# Patient Record
Sex: MALE | Race: WHITE | NOT HISPANIC OR LATINO
[De-identification: names, ages, dates, MRNs, and addresses within clinical notes are randomized per-mention and may not be internally consistent; named-entity substitution may affect disease eponyms.]

---

## 2017-01-17 ENCOUNTER — APPOINTMENT (OUTPATIENT)
Dept: NEUROLOGY | Facility: CLINIC | Age: 82
End: 2017-01-17

## 2017-01-17 VITALS
BODY MASS INDEX: 26.66 KG/M2 | HEART RATE: 59 BPM | SYSTOLIC BLOOD PRESSURE: 130 MMHG | HEIGHT: 65 IN | OXYGEN SATURATION: 89 % | WEIGHT: 160 LBS | DIASTOLIC BLOOD PRESSURE: 78 MMHG

## 2017-01-17 DIAGNOSIS — F03.90 UNSPECIFIED DEMENTIA W/OUT BEHAVIORAL DISTURBANCE: ICD-10-CM

## 2017-05-24 ENCOUNTER — EMERGENCY (EMERGENCY)
Facility: HOSPITAL | Age: 82
LOS: 1 days | Discharge: HOME | End: 2017-05-24
Admitting: EMERGENCY MEDICINE
Payer: COMMERCIAL

## 2017-05-24 DIAGNOSIS — S80.01XA CONTUSION OF RIGHT KNEE, INITIAL ENCOUNTER: ICD-10-CM

## 2017-05-24 DIAGNOSIS — M25.562 PAIN IN LEFT KNEE: ICD-10-CM

## 2017-05-24 DIAGNOSIS — S01.21XA LACERATION WITHOUT FOREIGN BODY OF NOSE, INITIAL ENCOUNTER: ICD-10-CM

## 2017-05-24 DIAGNOSIS — E78.00 PURE HYPERCHOLESTEROLEMIA, UNSPECIFIED: ICD-10-CM

## 2017-05-24 DIAGNOSIS — E11.9 TYPE 2 DIABETES MELLITUS WITHOUT COMPLICATIONS: ICD-10-CM

## 2017-05-24 DIAGNOSIS — Z23 ENCOUNTER FOR IMMUNIZATION: ICD-10-CM
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
## 2017-05-24 DIAGNOSIS — Z87.891 PERSONAL HISTORY OF NICOTINE DEPENDENCE: ICD-10-CM

## 2017-05-24 DIAGNOSIS — I10 ESSENTIAL (PRIMARY) HYPERTENSION: ICD-10-CM

## 2017-05-24 DIAGNOSIS — Z79.82 LONG TERM (CURRENT) USE OF ASPIRIN: ICD-10-CM
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
## 2017-05-24 PROCEDURE — 72125 CT NECK SPINE W/O DYE: CPT | Mod: 26

## 2017-05-24 PROCEDURE — 70486 CT MAXILLOFACIAL W/O DYE: CPT

## 2017-05-24 PROCEDURE — 12011 RPR F/E/E/N/L/M 2.5 CM/<: CPT

## 2017-05-24 PROCEDURE — 72125 CT NECK SPINE W/O DYE: CPT

## 2017-05-24 PROCEDURE — 73562 X-RAY EXAM OF KNEE 3: CPT

## 2017-05-24 PROCEDURE — 99284 EMERGENCY DEPT VISIT MOD MDM: CPT | Mod: 25

## 2017-05-24 PROCEDURE — 70450 CT HEAD/BRAIN W/O DYE: CPT | Mod: 26

## 2017-05-24 PROCEDURE — 73562 X-RAY EXAM OF KNEE 3: CPT | Mod: 26,RT

## 2017-05-24 PROCEDURE — 70486 CT MAXILLOFACIAL W/O DYE: CPT | Mod: 26

## 2017-05-24 PROCEDURE — 90471 IMMUNIZATION ADMIN: CPT

## 2017-05-24 PROCEDURE — 70450 CT HEAD/BRAIN W/O DYE: CPT

## 2017-08-14 ENCOUNTER — APPOINTMENT (OUTPATIENT)
Dept: ULTRASOUND IMAGING | Facility: HOSPITAL | Age: 82
End: 2017-08-14
Payer: MEDICARE

## 2017-08-14 ENCOUNTER — OUTPATIENT (OUTPATIENT)
Dept: OUTPATIENT SERVICES | Facility: HOSPITAL | Age: 82
LOS: 1 days | End: 2017-08-14
Payer: COMMERCIAL

## 2017-08-14 DIAGNOSIS — N28.1 CYST OF KIDNEY, ACQUIRED: ICD-10-CM

## 2017-08-14 PROCEDURE — 76775 US EXAM ABDO BACK WALL LIM: CPT

## 2017-08-14 PROCEDURE — 76775 US EXAM ABDO BACK WALL LIM: CPT | Mod: 26

## 2018-01-18 ENCOUNTER — TRANSCRIPTION ENCOUNTER (OUTPATIENT)
Age: 83
End: 2018-01-18

## 2019-05-18 ENCOUNTER — EMERGENCY (EMERGENCY)
Facility: HOSPITAL | Age: 84
LOS: 1 days | Discharge: HOME | End: 2019-05-18
Attending: EMERGENCY MEDICINE | Admitting: EMERGENCY MEDICINE
Payer: COMMERCIAL

## 2019-05-18 VITALS
SYSTOLIC BLOOD PRESSURE: 122 MMHG | HEIGHT: 65 IN | TEMPERATURE: 98 F | DIASTOLIC BLOOD PRESSURE: 55 MMHG | WEIGHT: 192.9 LBS | OXYGEN SATURATION: 98 % | RESPIRATION RATE: 18 BRPM | HEART RATE: 67 BPM

## 2019-05-18 VITALS
RESPIRATION RATE: 17 BRPM | HEART RATE: 67 BPM | SYSTOLIC BLOOD PRESSURE: 144 MMHG | OXYGEN SATURATION: 97 % | DIASTOLIC BLOOD PRESSURE: 52 MMHG

## 2019-05-18 LAB
ALBUMIN SERPL ELPH-MCNC: 3.1 G/DL — LOW (ref 3.3–5)
ALP SERPL-CCNC: 109 U/L — SIGNIFICANT CHANGE UP (ref 40–120)
ALT FLD-CCNC: 39 U/L DA — SIGNIFICANT CHANGE UP (ref 10–45)
ANION GAP SERPL CALC-SCNC: 9 MMOL/L — SIGNIFICANT CHANGE UP (ref 5–17)
APTT BLD: 31.1 SEC — SIGNIFICANT CHANGE UP (ref 27.5–36.3)
AST SERPL-CCNC: 27 U/L — SIGNIFICANT CHANGE UP (ref 10–40)
BILIRUB SERPL-MCNC: 0.4 MG/DL — SIGNIFICANT CHANGE UP (ref 0.2–1.2)
BUN SERPL-MCNC: 69 MG/DL — HIGH (ref 7–23)
CALCIUM SERPL-MCNC: 8 MG/DL — LOW (ref 8.4–10.5)
CHLORIDE SERPL-SCNC: 108 MMOL/L — SIGNIFICANT CHANGE UP (ref 96–108)
CO2 SERPL-SCNC: 23 MMOL/L — SIGNIFICANT CHANGE UP (ref 22–31)
CREAT SERPL-MCNC: 2.8 MG/DL — HIGH (ref 0.5–1.3)
GLUCOSE SERPL-MCNC: 177 MG/DL — HIGH (ref 70–99)
HCT VFR BLD CALC: 33.8 % — LOW (ref 39–50)
HGB BLD-MCNC: 10.7 G/DL — LOW (ref 13–17)
INR BLD: 1.08 RATIO — SIGNIFICANT CHANGE UP (ref 0.88–1.16)
MCHC RBC-ENTMCNC: 27.6 PG — SIGNIFICANT CHANGE UP (ref 27–34)
MCHC RBC-ENTMCNC: 31.7 GM/DL — LOW (ref 32–36)
MCV RBC AUTO: 87.3 FL — SIGNIFICANT CHANGE UP (ref 80–100)
NRBC # BLD: 0 /100 WBCS — SIGNIFICANT CHANGE UP (ref 0–0)
PLATELET # BLD AUTO: 193 K/UL — SIGNIFICANT CHANGE UP (ref 150–400)
POTASSIUM SERPL-MCNC: 4.6 MMOL/L — SIGNIFICANT CHANGE UP (ref 3.5–5.3)
POTASSIUM SERPL-SCNC: 4.6 MMOL/L — SIGNIFICANT CHANGE UP (ref 3.5–5.3)
PROT SERPL-MCNC: 6.3 G/DL — SIGNIFICANT CHANGE UP (ref 6–8.3)
PROTHROM AB SERPL-ACNC: 12.1 SEC — SIGNIFICANT CHANGE UP (ref 10–12.9)
RBC # BLD: 3.87 M/UL — LOW (ref 4.2–5.8)
RBC # FLD: 15.2 % — HIGH (ref 10.3–14.5)
SODIUM SERPL-SCNC: 140 MMOL/L — SIGNIFICANT CHANGE UP (ref 135–145)
TROPONIN I SERPL-MCNC: 0.02 NG/ML — SIGNIFICANT CHANGE UP (ref 0.02–0.06)
WBC # BLD: 10.33 K/UL — SIGNIFICANT CHANGE UP (ref 3.8–10.5)
WBC # FLD AUTO: 10.33 K/UL — SIGNIFICANT CHANGE UP (ref 3.8–10.5)

## 2019-05-18 PROCEDURE — 85027 COMPLETE CBC AUTOMATED: CPT

## 2019-05-18 PROCEDURE — 71046 X-RAY EXAM CHEST 2 VIEWS: CPT | Mod: 26

## 2019-05-18 PROCEDURE — 99285 EMERGENCY DEPT VISIT HI MDM: CPT

## 2019-05-18 PROCEDURE — 70450 CT HEAD/BRAIN W/O DYE: CPT | Mod: 26

## 2019-05-18 PROCEDURE — 84484 ASSAY OF TROPONIN QUANT: CPT

## 2019-05-18 PROCEDURE — 36415 COLL VENOUS BLD VENIPUNCTURE: CPT

## 2019-05-18 PROCEDURE — 80053 COMPREHEN METABOLIC PANEL: CPT

## 2019-05-18 PROCEDURE — 85730 THROMBOPLASTIN TIME PARTIAL: CPT

## 2019-05-18 PROCEDURE — 93005 ELECTROCARDIOGRAM TRACING: CPT

## 2019-05-18 PROCEDURE — 93010 ELECTROCARDIOGRAM REPORT: CPT

## 2019-05-18 PROCEDURE — 71046 X-RAY EXAM CHEST 2 VIEWS: CPT

## 2019-05-18 PROCEDURE — 99284 EMERGENCY DEPT VISIT MOD MDM: CPT | Mod: 25

## 2019-05-18 PROCEDURE — 70450 CT HEAD/BRAIN W/O DYE: CPT

## 2019-05-18 PROCEDURE — 85610 PROTHROMBIN TIME: CPT

## 2019-05-18 RX ORDER — SERTRALINE 25 MG/1
1 TABLET, FILM COATED ORAL
Qty: 0 | Refills: 0 | DISCHARGE

## 2019-05-18 RX ORDER — GALANTAMINE HYDROBROMIDE 4 MG/1
1 TABLET, FILM COATED ORAL
Qty: 0 | Refills: 0 | DISCHARGE

## 2019-05-18 RX ORDER — NEBIVOLOL HYDROCHLORIDE 5 MG/1
1 TABLET ORAL
Qty: 0 | Refills: 0 | DISCHARGE

## 2019-05-18 NOTE — ED PROVIDER NOTE - CLINICAL SUMMARY MEDICAL DECISION MAKING FREE TEXT BOX
pt pted with ventricular bigeminy but fall purely non syncopal with minimal trauma negative radiologic/lab workup reliable pt and concerned family; d/c with close monitoring; RTED prn

## 2019-05-18 NOTE — ED PROVIDER NOTE - CHPI ED SYMPTOMS NEG
no loss of consciousness/no numbness/no tingling/no weakness/no abrasion/no fever/no confusion/no deformity/no bleeding/no vomiting/non syncopal

## 2019-05-18 NOTE — ED PROVIDER NOTE - QUALITY
Use Motrin, ibuprofen, Advil or other anti-inflammatory for help with pain over the next 3 days, follow-up with your pediatrician if no improvement, return if worse or for any concerns  
aching

## 2019-05-18 NOTE — ED ADULT NURSE NOTE - PMH
chronic renal disease    COPD (chronic obstructive pulmonary disease)    Dementia    Depression    Diabetes mellitus    Dyslipidemia    H/O: hypertension    H/O: obesity    History of prostate cancer    Hyperlipemia

## 2019-05-18 NOTE — ED PROVIDER NOTE - CARE PLAN
Principal Discharge DX:	Head injury  Secondary Diagnosis:	Injury of head, initial encounter  Secondary Diagnosis:	Ventricular bigeminy seen on cardiac monitor

## 2019-05-18 NOTE — ED ADULT NURSE NOTE - NSIMPLEMENTINTERV_GEN_ALL_ED
Implemented All Fall with Harm Risk Interventions:  [place] to call system. Call bell, personal items and telephone within reach. Instruct patient to call for assistance. Room bathroom lighting operational. Non-slip footwear when patient is off stretcher. Physically safe environment: no spills, clutter or unnecessary equipment. Stretcher in lowest position, wheels locked, appropriate side rails in place. Provide visual cue, wrist band, yellow gown, etc. Monitor gait and stability. Monitor for mental status changes and reorient to person, place, and time. Review medications for side effects contributing to fall risk. Reinforce activity limits and safety measures with patient and family. Provide visual clues: red socks.

## 2019-05-18 NOTE — ED ADULT NURSE NOTE - OBJECTIVE STATEMENT
Pt presents to the ER complaining of having a fall this morning while it was walking form the table to the bathroom. Pt fall on his back and hit his head. According to daughter in law he did not had LOC but does not remember what happen. Pt with hx of dementia.

## 2020-03-23 ENCOUNTER — INPATIENT (INPATIENT)
Facility: HOSPITAL | Age: 85
LOS: 17 days | Discharge: HOSPICE | DRG: 640 | End: 2020-04-10
Attending: FAMILY MEDICINE | Admitting: FAMILY MEDICINE
Payer: COMMERCIAL

## 2020-03-23 VITALS
HEART RATE: 73 BPM | SYSTOLIC BLOOD PRESSURE: 141 MMHG | RESPIRATION RATE: 24 BRPM | TEMPERATURE: 96 F | HEIGHT: 68 IN | OXYGEN SATURATION: 100 % | DIASTOLIC BLOOD PRESSURE: 68 MMHG | WEIGHT: 160.06 LBS

## 2020-03-23 DIAGNOSIS — E83.51 HYPOCALCEMIA: ICD-10-CM

## 2020-03-23 LAB
ALBUMIN SERPL ELPH-MCNC: 3 G/DL — LOW (ref 3.3–5)
ALP SERPL-CCNC: 258 U/L — HIGH (ref 40–120)
ALT FLD-CCNC: 37 U/L — SIGNIFICANT CHANGE UP (ref 10–45)
ANION GAP SERPL CALC-SCNC: 20 MMOL/L — HIGH (ref 5–17)
ANION GAP SERPL CALC-SCNC: 21 MMOL/L — HIGH (ref 5–17)
APPEARANCE UR: CLEAR — SIGNIFICANT CHANGE UP
APTT BLD: 34.6 SEC — SIGNIFICANT CHANGE UP (ref 27.5–36.3)
AST SERPL-CCNC: 95 U/L — HIGH (ref 10–40)
BACTERIA # UR AUTO: ABNORMAL /HPF
BASOPHILS # BLD AUTO: 0.02 K/UL — SIGNIFICANT CHANGE UP (ref 0–0.2)
BASOPHILS NFR BLD AUTO: 0.2 % — SIGNIFICANT CHANGE UP (ref 0–2)
BILIRUB SERPL-MCNC: 0.4 MG/DL — SIGNIFICANT CHANGE UP (ref 0.2–1.2)
BILIRUB UR-MCNC: NEGATIVE — SIGNIFICANT CHANGE UP
BUN SERPL-MCNC: 58 MG/DL — HIGH (ref 7–23)
BUN SERPL-MCNC: 59 MG/DL — HIGH (ref 7–23)
CALCIUM SERPL-MCNC: <5 MG/DL — CRITICAL LOW (ref 8.4–10.5)
CALCIUM SERPL-MCNC: <5 MG/DL — CRITICAL LOW (ref 8.4–10.5)
CHLORIDE SERPL-SCNC: 100 MMOL/L — SIGNIFICANT CHANGE UP (ref 96–108)
CHLORIDE SERPL-SCNC: 102 MMOL/L — SIGNIFICANT CHANGE UP (ref 96–108)
CO2 BLDA-SCNC: 13 MMOL/L — LOW (ref 22–30)
CO2 SERPL-SCNC: 13 MMOL/L — LOW (ref 22–31)
CO2 SERPL-SCNC: 15 MMOL/L — LOW (ref 22–31)
COLOR SPEC: YELLOW — SIGNIFICANT CHANGE UP
CREAT SERPL-MCNC: 3.08 MG/DL — HIGH (ref 0.5–1.3)
CREAT SERPL-MCNC: 3.11 MG/DL — HIGH (ref 0.5–1.3)
DIFF PNL FLD: ABNORMAL
EOSINOPHIL # BLD AUTO: 0.14 K/UL — SIGNIFICANT CHANGE UP (ref 0–0.5)
EOSINOPHIL NFR BLD AUTO: 1.2 % — SIGNIFICANT CHANGE UP (ref 0–6)
EPI CELLS # UR: SIGNIFICANT CHANGE UP
GAS PNL BLDA: SIGNIFICANT CHANGE UP
GLUCOSE BLDC GLUCOMTR-MCNC: 209 MG/DL — HIGH (ref 70–99)
GLUCOSE SERPL-MCNC: 175 MG/DL — HIGH (ref 70–99)
GLUCOSE SERPL-MCNC: 209 MG/DL — HIGH (ref 70–99)
GLUCOSE UR QL: 50 MG/DL
HCT VFR BLD CALC: 32.4 % — LOW (ref 39–50)
HGB BLD-MCNC: 10.6 G/DL — LOW (ref 13–17)
HOROWITZ INDEX BLDA+IHG-RTO: SIGNIFICANT CHANGE UP
IMM GRANULOCYTES NFR BLD AUTO: 0.7 % — SIGNIFICANT CHANGE UP (ref 0–1.5)
INR BLD: 1.2 RATIO — HIGH (ref 0.88–1.16)
KETONES UR-MCNC: NEGATIVE — SIGNIFICANT CHANGE UP
LACTATE SERPL-SCNC: 1.4 MMOL/L — SIGNIFICANT CHANGE UP (ref 0.7–2)
LEUKOCYTE ESTERASE UR-ACNC: NEGATIVE — SIGNIFICANT CHANGE UP
LYMPHOCYTES # BLD AUTO: 1.06 K/UL — SIGNIFICANT CHANGE UP (ref 1–3.3)
LYMPHOCYTES # BLD AUTO: 8.8 % — LOW (ref 13–44)
MAGNESIUM SERPL-MCNC: 1.3 MG/DL — LOW (ref 1.6–2.6)
MAGNESIUM SERPL-MCNC: 1.8 MG/DL — SIGNIFICANT CHANGE UP (ref 1.6–2.6)
MCHC RBC-ENTMCNC: 28.9 PG — SIGNIFICANT CHANGE UP (ref 27–34)
MCHC RBC-ENTMCNC: 32.7 GM/DL — SIGNIFICANT CHANGE UP (ref 32–36)
MCV RBC AUTO: 88.3 FL — SIGNIFICANT CHANGE UP (ref 80–100)
MONOCYTES # BLD AUTO: 1.05 K/UL — HIGH (ref 0–0.9)
MONOCYTES NFR BLD AUTO: 8.8 % — SIGNIFICANT CHANGE UP (ref 2–14)
NEUTROPHILS # BLD AUTO: 9.65 K/UL — HIGH (ref 1.8–7.4)
NEUTROPHILS NFR BLD AUTO: 80.3 % — HIGH (ref 43–77)
NITRITE UR-MCNC: NEGATIVE — SIGNIFICANT CHANGE UP
NRBC # BLD: 0 /100 WBCS — SIGNIFICANT CHANGE UP (ref 0–0)
PCO2 BLDA: 25 MMHG — LOW (ref 32–46)
PH BLDA: 7.29 — LOW (ref 7.35–7.45)
PH UR: 5 — SIGNIFICANT CHANGE UP (ref 5–8)
PHOSPHATE SERPL-MCNC: 4.9 MG/DL — HIGH (ref 2.5–4.5)
PLATELET # BLD AUTO: 264 K/UL — SIGNIFICANT CHANGE UP (ref 150–400)
PO2 BLDA: 311 MMHG — HIGH (ref 74–108)
POTASSIUM SERPL-MCNC: 4.6 MMOL/L — SIGNIFICANT CHANGE UP (ref 3.5–5.3)
POTASSIUM SERPL-MCNC: 6 MMOL/L — HIGH (ref 3.5–5.3)
POTASSIUM SERPL-SCNC: 4.6 MMOL/L — SIGNIFICANT CHANGE UP (ref 3.5–5.3)
POTASSIUM SERPL-SCNC: 6 MMOL/L — HIGH (ref 3.5–5.3)
PROT SERPL-MCNC: 7.2 G/DL — SIGNIFICANT CHANGE UP (ref 6–8.3)
PROT UR-MCNC: 500 MG/DL
PROTHROM AB SERPL-ACNC: 13.5 SEC — HIGH (ref 10–12.9)
RBC # BLD: 3.67 M/UL — LOW (ref 4.2–5.8)
RBC # FLD: 16.4 % — HIGH (ref 10.3–14.5)
RBC CASTS # UR COMP ASSIST: ABNORMAL /HPF (ref 0–4)
SAO2 % BLDA: >99 % — HIGH (ref 92–96)
SODIUM SERPL-SCNC: 135 MMOL/L — SIGNIFICANT CHANGE UP (ref 135–145)
SODIUM SERPL-SCNC: 136 MMOL/L — SIGNIFICANT CHANGE UP (ref 135–145)
SP GR SPEC: 1.01 — SIGNIFICANT CHANGE UP (ref 1.01–1.02)
TROPONIN I SERPL-MCNC: <.017 NG/ML — LOW (ref 0.02–0.06)
UROBILINOGEN FLD QL: NEGATIVE — SIGNIFICANT CHANGE UP
WBC # BLD: 12 K/UL — HIGH (ref 3.8–10.5)
WBC # FLD AUTO: 12 K/UL — HIGH (ref 3.8–10.5)
WBC UR QL: SIGNIFICANT CHANGE UP /HPF (ref 0–5)

## 2020-03-23 PROCEDURE — 99223 1ST HOSP IP/OBS HIGH 75: CPT

## 2020-03-23 PROCEDURE — 71045 X-RAY EXAM CHEST 1 VIEW: CPT | Mod: 26

## 2020-03-23 PROCEDURE — 93010 ELECTROCARDIOGRAM REPORT: CPT

## 2020-03-23 PROCEDURE — 99285 EMERGENCY DEPT VISIT HI MDM: CPT

## 2020-03-23 RX ORDER — CALCIUM GLUCONATE 100 MG/ML
2 VIAL (ML) INTRAVENOUS ONCE
Refills: 0 | Status: COMPLETED | OUTPATIENT
Start: 2020-03-23 | End: 2020-03-23

## 2020-03-23 RX ORDER — MAGNESIUM SULFATE 500 MG/ML
1 VIAL (ML) INJECTION
Refills: 0 | Status: COMPLETED | OUTPATIENT
Start: 2020-03-23 | End: 2020-03-23

## 2020-03-23 RX ORDER — SODIUM CHLORIDE 9 MG/ML
1000 INJECTION, SOLUTION INTRAVENOUS
Refills: 0 | Status: DISCONTINUED | OUTPATIENT
Start: 2020-03-23 | End: 2020-04-10

## 2020-03-23 RX ORDER — ACETAMINOPHEN 500 MG
650 TABLET ORAL EVERY 4 HOURS
Refills: 0 | Status: DISCONTINUED | OUTPATIENT
Start: 2020-03-23 | End: 2020-04-10

## 2020-03-23 RX ORDER — DEXTROSE 50 % IN WATER 50 %
25 SYRINGE (ML) INTRAVENOUS ONCE
Refills: 0 | Status: DISCONTINUED | OUTPATIENT
Start: 2020-03-23 | End: 2020-04-10

## 2020-03-23 RX ORDER — INSULIN LISPRO 100/ML
VIAL (ML) SUBCUTANEOUS AT BEDTIME
Refills: 0 | Status: DISCONTINUED | OUTPATIENT
Start: 2020-03-23 | End: 2020-03-31

## 2020-03-23 RX ORDER — SERTRALINE 25 MG/1
100 TABLET, FILM COATED ORAL DAILY
Refills: 0 | Status: DISCONTINUED | OUTPATIENT
Start: 2020-03-23 | End: 2020-04-10

## 2020-03-23 RX ORDER — DEXTROSE 50 % IN WATER 50 %
12.5 SYRINGE (ML) INTRAVENOUS ONCE
Refills: 0 | Status: DISCONTINUED | OUTPATIENT
Start: 2020-03-23 | End: 2020-04-10

## 2020-03-23 RX ORDER — SODIUM BICARBONATE 1 MEQ/ML
1300 SYRINGE (ML) INTRAVENOUS
Refills: 0 | Status: DISCONTINUED | OUTPATIENT
Start: 2020-03-23 | End: 2020-03-24

## 2020-03-23 RX ORDER — ASPIRIN/CALCIUM CARB/MAGNESIUM 324 MG
81 TABLET ORAL DAILY
Refills: 0 | Status: DISCONTINUED | OUTPATIENT
Start: 2020-03-23 | End: 2020-04-10

## 2020-03-23 RX ORDER — ALBUTEROL 90 UG/1
2 AEROSOL, METERED ORAL EVERY 6 HOURS
Refills: 0 | Status: DISCONTINUED | OUTPATIENT
Start: 2020-03-23 | End: 2020-03-25

## 2020-03-23 RX ORDER — CALCITRIOL 0.5 UG/1
0.5 CAPSULE ORAL ONCE
Refills: 0 | Status: COMPLETED | OUTPATIENT
Start: 2020-03-23 | End: 2020-03-23

## 2020-03-23 RX ORDER — BUPROPION HYDROCHLORIDE 150 MG/1
200 TABLET, EXTENDED RELEASE ORAL DAILY
Refills: 0 | Status: DISCONTINUED | OUTPATIENT
Start: 2020-03-23 | End: 2020-03-23

## 2020-03-23 RX ORDER — AMLODIPINE BESYLATE 2.5 MG/1
5 TABLET ORAL DAILY
Refills: 0 | Status: DISCONTINUED | OUTPATIENT
Start: 2020-03-23 | End: 2020-03-25

## 2020-03-23 RX ORDER — MAGNESIUM SULFATE 500 MG/ML
2 VIAL (ML) INJECTION ONCE
Refills: 0 | Status: DISCONTINUED | OUTPATIENT
Start: 2020-03-23 | End: 2020-03-23

## 2020-03-23 RX ORDER — REPAGLINIDE 1 MG/1
1 TABLET ORAL THREE TIMES A DAY
Refills: 0 | Status: DISCONTINUED | OUTPATIENT
Start: 2020-03-23 | End: 2020-03-23

## 2020-03-23 RX ORDER — SODIUM POLYSTYRENE SULFONATE 4.1 MEQ/G
15 POWDER, FOR SUSPENSION ORAL ONCE
Refills: 0 | Status: COMPLETED | OUTPATIENT
Start: 2020-03-23 | End: 2020-03-23

## 2020-03-23 RX ORDER — CEFEPIME 1 G/1
1000 INJECTION, POWDER, FOR SOLUTION INTRAMUSCULAR; INTRAVENOUS ONCE
Refills: 0 | Status: COMPLETED | OUTPATIENT
Start: 2020-03-23 | End: 2020-03-23

## 2020-03-23 RX ORDER — INSULIN LISPRO 100/ML
VIAL (ML) SUBCUTANEOUS
Refills: 0 | Status: DISCONTINUED | OUTPATIENT
Start: 2020-03-23 | End: 2020-03-31

## 2020-03-23 RX ORDER — GLUCAGON INJECTION, SOLUTION 0.5 MG/.1ML
1 INJECTION, SOLUTION SUBCUTANEOUS ONCE
Refills: 0 | Status: DISCONTINUED | OUTPATIENT
Start: 2020-03-23 | End: 2020-04-10

## 2020-03-23 RX ORDER — ATORVASTATIN CALCIUM 80 MG/1
10 TABLET, FILM COATED ORAL AT BEDTIME
Refills: 0 | Status: DISCONTINUED | OUTPATIENT
Start: 2020-03-23 | End: 2020-04-10

## 2020-03-23 RX ORDER — DEXTROSE 50 % IN WATER 50 %
15 SYRINGE (ML) INTRAVENOUS ONCE
Refills: 0 | Status: DISCONTINUED | OUTPATIENT
Start: 2020-03-23 | End: 2020-04-10

## 2020-03-23 RX ORDER — ALBUTEROL 90 UG/1
2 AEROSOL, METERED ORAL EVERY 6 HOURS
Refills: 0 | Status: DISCONTINUED | OUTPATIENT
Start: 2020-03-23 | End: 2020-03-23

## 2020-03-23 RX ORDER — CALCIUM GLUCONATE 100 MG/ML
1 VIAL (ML) INTRAVENOUS ONCE
Refills: 0 | Status: COMPLETED | OUTPATIENT
Start: 2020-03-23 | End: 2020-03-23

## 2020-03-23 RX ORDER — HEPARIN SODIUM 5000 [USP'U]/ML
5000 INJECTION INTRAVENOUS; SUBCUTANEOUS EVERY 8 HOURS
Refills: 0 | Status: DISCONTINUED | OUTPATIENT
Start: 2020-03-23 | End: 2020-04-10

## 2020-03-23 RX ORDER — ALBUTEROL 90 UG/1
8 AEROSOL, METERED ORAL ONCE
Refills: 0 | Status: COMPLETED | OUTPATIENT
Start: 2020-03-23 | End: 2020-03-23

## 2020-03-23 RX ORDER — SODIUM CHLORIDE 9 MG/ML
2300 INJECTION INTRAMUSCULAR; INTRAVENOUS; SUBCUTANEOUS ONCE
Refills: 0 | Status: COMPLETED | OUTPATIENT
Start: 2020-03-23 | End: 2020-03-23

## 2020-03-23 RX ORDER — FUROSEMIDE 40 MG
60 TABLET ORAL ONCE
Refills: 0 | Status: COMPLETED | OUTPATIENT
Start: 2020-03-23 | End: 2020-03-23

## 2020-03-23 RX ORDER — SODIUM CHLORIDE 9 MG/ML
1000 INJECTION INTRAMUSCULAR; INTRAVENOUS; SUBCUTANEOUS
Refills: 0 | Status: DISCONTINUED | OUTPATIENT
Start: 2020-03-23 | End: 2020-03-23

## 2020-03-23 RX ORDER — BUPROPION HYDROCHLORIDE 150 MG/1
150 TABLET, EXTENDED RELEASE ORAL DAILY
Refills: 0 | Status: DISCONTINUED | OUTPATIENT
Start: 2020-03-23 | End: 2020-04-10

## 2020-03-23 RX ADMIN — Medication 100 GRAM(S): at 19:40

## 2020-03-23 RX ADMIN — Medication 60 MILLIGRAM(S): at 19:39

## 2020-03-23 RX ADMIN — Medication 100 GRAM(S): at 20:54

## 2020-03-23 RX ADMIN — SODIUM POLYSTYRENE SULFONATE 15 GRAM(S): 4.1 POWDER, FOR SUSPENSION ORAL at 19:40

## 2020-03-23 RX ADMIN — SODIUM CHLORIDE 2300 MILLILITER(S): 9 INJECTION INTRAMUSCULAR; INTRAVENOUS; SUBCUTANEOUS at 18:30

## 2020-03-23 RX ADMIN — SODIUM CHLORIDE 2300 MILLILITER(S): 9 INJECTION INTRAMUSCULAR; INTRAVENOUS; SUBCUTANEOUS at 17:30

## 2020-03-23 RX ADMIN — ATORVASTATIN CALCIUM 10 MILLIGRAM(S): 80 TABLET, FILM COATED ORAL at 21:20

## 2020-03-23 RX ADMIN — CEFEPIME 1000 MILLIGRAM(S): 1 INJECTION, POWDER, FOR SOLUTION INTRAMUSCULAR; INTRAVENOUS at 18:00

## 2020-03-23 RX ADMIN — Medication 1 GRAM(S): at 18:30

## 2020-03-23 RX ADMIN — Medication 100 GRAM(S): at 18:01

## 2020-03-23 RX ADMIN — CALCITRIOL 0.5 MICROGRAM(S): 0.5 CAPSULE ORAL at 18:01

## 2020-03-23 RX ADMIN — ALBUTEROL 8 PUFF(S): 90 AEROSOL, METERED ORAL at 17:02

## 2020-03-23 RX ADMIN — CEFEPIME 100 MILLIGRAM(S): 1 INJECTION, POWDER, FOR SOLUTION INTRAMUSCULAR; INTRAVENOUS at 17:30

## 2020-03-23 RX ADMIN — HEPARIN SODIUM 5000 UNIT(S): 5000 INJECTION INTRAVENOUS; SUBCUTANEOUS at 21:20

## 2020-03-23 RX ADMIN — Medication 100 GRAM(S): at 18:00

## 2020-03-23 RX ADMIN — Medication 40 MILLIGRAM(S): at 20:54

## 2020-03-23 NOTE — ED PROVIDER NOTE - OBJECTIVE STATEMENT
86 year old male, PMHx of dementia, DM, HTN, depression, CKD, COPD; presents to the ED with difficulty breathing. As per daughter, the patient has always had shortness of breath due to COPD but since Saturday it has gotten progressively worse with intermittent wheezing. In January, they found out that he has advanced prostate cancer. He has tried multiple medications, but on 3/12 he had an injection of Xgeva. Since then, he has been generally weak and not responding well. Starting on Thursday, he "decompensated". He has been hallucinating, not able to eat or drink, he has not been able to make it to the bathroom, and he has been weak and unable to ambulate even with his walker today prompting her to send him to the ED for evaluation. Daughter is concerned because she states one of the side effects could be hypercalcemia. She denies fevers, vomiting, diarrhea, chest pain or other complaints. No sick contacts or recent travel.

## 2020-03-23 NOTE — ED PROVIDER NOTE - CRANIAL NERVE AND PUPILLARY EXAM
central vision intact/moves all extremities against gravity; awake, alert, does not follow commands, confused h/o dementia

## 2020-03-23 NOTE — H&P ADULT - NSICDXPASTMEDICALHX_GEN_ALL_CORE_FT
PAST MEDICAL HISTORY:  chronic renal disease     COPD (chronic obstructive pulmonary disease)     Dementia     Depression     Diabetes mellitus     Dyslipidemia     H/O: hypertension     H/O: obesity     History of prostate cancer     Hyperlipemia

## 2020-03-23 NOTE — ED PROVIDER NOTE - ATTENDING CONTRIBUTION TO CARE
Lilian with [facility]. 86 year old male, PMHx of dementia, DM, HTN, depression, CKD, COPD; presents to the ED with difficulty breathing. As per daughter, the patient has always had shortness of breath due to COPD but since Saturday it has gotten progressively worse with intermittent wheezing. In January, they found out that he has advanced prostate cancer. He has tried multiple medications, but on 3/12 he had an injection of Xgeva. Since then, he has been generally weak and not responding well. Starting on Thursday, he "decompensated". He has been hallucinating, not able to eat or drink, he has not been able to make it to the bathroom, and he has been weak and unable to ambulate even with his walker today prompting her to send him to the ED for evaluation. Daughter is concerned because she states one of the side effects could be hypercalcemia. She denies fevers, vomiting, diarrhea, chest pain or other complaints. No sick contacts or recent travel.  86 year old male p/w increased confusion, generalized weakness, decreased PO, will draw sepsis labs, will hold abx and ivf as r/o covid.  Patient is oriented to person and situation. He says he feels like he cannot breathe. He has no chest pain or abd pain. No reported fever. D/W Daughter.   Per labs, pt has hyperkalemia, hypocalcemia, hypomagnesemia. Consider Xgeva overcorrecting calcium. Will admit for electrolyte repletion due to metabolic acidosis.  I performed a face to face bedside interview with patient regarding history of present illness, review of symptoms and past medical history. I completed an independent physical exam.  I have discussed the patient's plan of care with Physician Assistant (PA). I agree with note as stated above, having amended the EMR as needed to reflect my findings.   This includes History of Present Illness, HIV, Past Medical/Surgical/Family/Social History, Allergies and Home Medications, Review of Systems, Physical Exam, and any Progress Notes during the time I functioned as the attending physician for this patient.

## 2020-03-23 NOTE — H&P ADULT - HISTORY OF PRESENT ILLNESS
87 yo M with PMHx of dementia, DM, HTN, depression, CKD, COPD presents to the ED with difficulty breathing and progressive confusion. History obtained via chart and ED staff as pt is unable to provide history and unable to reach daughter via phone at this time. ED staff spoke with daughter earlier who reports that pt had always had shortness of breath due to COPD but since Saturday it has gotten progressively worse with intermittent wheezing. In January, they found out that he has advanced prostate cancer. He has tried multiple medications, but on 3/12 he had an injection of Xgeva. Since then, he has been generally weak and not responding well. Starting on Thursday, he started to have hallucinations, decreased PO, and progressively weaker and unable to ambulate even with his walker. No reports of fevers, vomiting, diarrhea, chest pain or other complaints. No sick contacts or recent travel.

## 2020-03-23 NOTE — ED PROVIDER NOTE - CLINICAL SUMMARY MEDICAL DECISION MAKING FREE TEXT BOX
86 year old male pw increased confusion, generalized weakness, decreased PO, will draw sepsis labs, will hold abx and ivf as r/o covid. 86 year old male, PMHx of dementia, DM, HTN, depression, CKD, COPD; presents to the ED with difficulty breathing. As per daughter, the patient has always had shortness of breath due to COPD but since Saturday it has gotten progressively worse with intermittent wheezing. In January, they found out that he has advanced prostate cancer. He has tried multiple medications, but on 3/12 he had an injection of Xgeva. Since then, he has been generally weak and not responding well. Starting on Thursday, he "decompensated". He has been hallucinating, not able to eat or drink, he has not been able to make it to the bathroom, and he has been weak and unable to ambulate even with his walker today prompting her to send him to the ED for evaluation. Daughter is concerned because she states one of the side effects could be hypercalcemia. She denies fevers, vomiting, diarrhea, chest pain or other complaints. No sick contacts or recent travel.  86 year old male p/w increased confusion, generalized weakness, decreased PO, will draw sepsis labs, will hold abx and ivf as r/o covid.  Patient is oriented to person and situation. He says he feels like he cannot breathe. He has no chest pain or abd pain. No reported fever. D/W Daughter.   Per labs, pt has hyperkalemia, hypocalcemia, hypomagnesemia. Consider Xgeva overcorrecting calcium. Will admit for electrolyte repletion due to metabolic acidosis.

## 2020-03-23 NOTE — ED ADULT TRIAGE NOTE - CHIEF COMPLAINT QUOTE
Pt BIB EMS for shortness of breath.  Pt on 100% NRB from ambulance.  Pt responsive to verbal stimulation.

## 2020-03-23 NOTE — ED ADULT NURSE REASSESSMENT NOTE - NS ED NURSE REASSESS COMMENT FT1
Red bruising noted on left arm and abrasion to lower leg noted upon ED arrival. Given report and notified receiving RN.

## 2020-03-23 NOTE — H&P ADULT - ASSESSMENT
87 yo M with PMHx of dementia, DM, HTN, depression, CKD, COPD presents to the ED with difficulty breathing and progressive confusion.    #Metabolic encephalopathy: 2/2 multiple electrolytes abnormalities likely 2/2 Xgeva (denosumab) and PABLITO on CKD  - correct electrolytes as below  - trend BMP  - aspiration precautions    #Hypocalcemia: Ca <5, likely 2/2 Denosumab in patient with existing CKD  - prolonged QTc on ECG (566)  - received Calcium gluconate 1g in ED  - give another calcium gluconate 1g  - 85 yo M with PMHx of dementia, DM, HTN, depression, CKD4, COPD presents to the ED with difficulty breathing and progressive confusion.    #Metabolic encephalopathy: 2/2 multiple electrolytes abnormalities likely 2/2 Xgeva (denosumab) and PABLITO on CKD.   - correct electrolytes as below  - trend BMP  - COVID-19 PCR sent by ED, f/u results  - aspiration precautions    #Hypocalcemia: Ca <5, likely 2/2 Denosumab in patient with existing CKD  - prolonged QTc on ECG (566)  - received Calcium gluconate 1g in ED  - give another calcium gluconate 1g  - replete magnesium  - repeat ECG in AM  - admit to telemetry  - trend BMP    #Hypomagnesemia:  - replete    #Hyperkalemia: likely 2/2 PABLITO on CKD due to decreased PO intake  - no peaked T on ECG, VA interval normal  - give Kayexalate  - repeat BMP at 8pm    #PABLITO on CKD stage 4 and metabolic acidosis:  - continue IVF  - hold nephrotoxic meds  - continue sodium bicarb  - trend BMP    #COPD: scattered wheezing on exam  - Albuterol inhaler q6hrs    #HTN:  - continue amlodipine  - will hold BBlocker due to hyperkalemia    #Depression:  - continue Sertraline and buproprion (home dose buproprion 100mg BID, pharmacy here only has 150mg XL)    #DVT ppx:  - HSQ    #Goals of care:  - was not able to reach daughter for GOC discussion    CAPRINI SCORE [CLOT]    AGE RELATED RISK FACTORS                                                       MOBILITY RELATED FACTORS  [ ] Age 41-60 years                                            (1 Point)                  [ ] Bed rest                                                        (1 Point)  [ ] Age: 61-74 years                                           (2 Points)                 [ ] Plaster cast                                                   (2 Points)  [x] Age= 75 years                                              (3 Points)                 [x ] Bed bound for more than 72 hours                 (2 Points)    DISEASE RELATED RISK FACTORS                                               GENDER SPECIFIC FACTORS  [ ] Edema in the lower extremities                       (1 Point)                  [ ] Pregnancy                                                     (1 Point)  [ ] Varicose veins                                               (1 Point)                  [ ] Post-partum < 6 weeks                                   (1 Point)             [ ] BMI > 25 Kg/m2                                            (1 Point)                  [ ] Hormonal therapy  or oral contraception          (1 Point)                 [ ] Sepsis (in the previous month)                        (1 Point)                  [ ] History of pregnancy complications                 (1 point)  [ ] Pneumonia or serious lung disease                                               [ ] Unexplained or recurrent                     (1 Point)           (in the previous month)                               (1 Point)  [ ] Abnormal pulmonary function test                     (1 Point)                 SURGERY RELATED RISK FACTORS  [ ] Acute myocardial infarction                              (1 Point)                 [ ]  Section                                             (1 Point)  [ ] Congestive heart failure (in the previous month)  (1 Point)               [ ] Minor surgery                                                  (1 Point)   [ ] Inflammatory bowel disease                             (1 Point)                 [ ] Arthroscopic surgery                                        (2 Points)  [ ] Central venous access                                      (2 Points)                [ ] General surgery lasting more than 45 minutes   (2 Points)       [ ] Stroke (in the previous month)                          (5 Points)               [ ] Elective arthroplasty                                         (5 Points)                                                                                                                                               HEMATOLOGY RELATED FACTORS                                                 TRAUMA RELATED RISK FACTORS  [ ] Prior episodes of VTE                                     (3 Points)                [ ] Fracture of the hip, pelvis, or leg                       (5 Points)  [ ] Positive family history for VTE                         (3 Points)                 [ ] Acute spinal cord injury (in the previous month)  (5 Points)  [ ] Prothrombin 37779 A                                     (3 Points)                 [ ] Paralysis  (less than 1 month)                             (5 Points)  [ ] Factor V Leiden                                             (3 Points)                  [ ] Multiple Trauma within 1 month                        (5 Points)  [ ] Lupus anticoagulants                                     (3 Points)                                                           [ ] Anticardiolipin antibodies                               (3 Points)                                                       [ ] High homocysteine in the blood                      (3 Points)                                             [ ] Other congenital or acquired thrombophilia      (3 Points)                                                [ ] Heparin induced thrombocytopenia                  (3 Points)                                          Total Score [  5    ]    Caprini Score 0 - 2:  Low Risk, No VTE Prophylaxis required for most patients, encourage ambulation  Caprini Score 3 - 6:  At Risk, pharmacologic VTE prophylaxis is indicated for most patients (in the absence of a contraindication)  Caprini Score Greater than or = 7:  High Risk, pharmacologic VTE prophylaxis is indicated for most patients (in the absence of a contraindication) 87 yo M with PMHx of dementia, DM, HTN, depression, CKD4, COPD presents to the ED with difficulty breathing and progressive confusion.    #Metabolic encephalopathy: 2/2 multiple electrolytes abnormalities likely 2/2 Xgeva (denosumab) and PABLITO on CKD.   - correct electrolytes as below  - trend BMP  - COVID-19 PCR sent by ED, f/u results  - aspiration precautions    #Hypocalcemia: Ca <5, likely 2/2 Denosumab in patient with existing CKD  - prolonged QTc on ECG (566)  - received Calcium gluconate 1g in ED  - give another calcium gluconate 1g  - replete magnesium  - repeat ECG in AM  - check PTH, vitamin D levels  - admit to telemetry  - trend BMP    #Hypomagnesemia:  - replete    #Hyperkalemia: likely 2/2 PABLITO on CKD due to decreased PO intake  - no peaked T on ECG, CT interval normal  - give Kayexalate  - repeat BMP at 8pm    #PABLITO on CKD stage 4 and metabolic acidosis:  - continue IVF  - hold nephrotoxic meds  - continue sodium bicarb  - trend BMP    #COPD: scattered wheezing on exam  - Albuterol inhaler q6hrs    #HTN:  - continue amlodipine  - will hold BBlocker due to hyperkalemia    #Depression:  - continue Sertraline and buproprion (home dose buproprion 100mg BID, pharmacy here only has 150mg XL)    #DVT ppx:  - HSQ    #Goals of care:  - was not able to reach daughter for GOC discussion    CAPRINI SCORE [CLOT]    AGE RELATED RISK FACTORS                                                       MOBILITY RELATED FACTORS  [ ] Age 41-60 years                                            (1 Point)                  [ ] Bed rest                                                        (1 Point)  [ ] Age: 61-74 years                                           (2 Points)                 [ ] Plaster cast                                                   (2 Points)  [x] Age= 75 years                                              (3 Points)                 [x ] Bed bound for more than 72 hours                 (2 Points)    DISEASE RELATED RISK FACTORS                                               GENDER SPECIFIC FACTORS  [ ] Edema in the lower extremities                       (1 Point)                  [ ] Pregnancy                                                     (1 Point)  [ ] Varicose veins                                               (1 Point)                  [ ] Post-partum < 6 weeks                                   (1 Point)             [ ] BMI > 25 Kg/m2                                            (1 Point)                  [ ] Hormonal therapy  or oral contraception          (1 Point)                 [ ] Sepsis (in the previous month)                        (1 Point)                  [ ] History of pregnancy complications                 (1 point)  [ ] Pneumonia or serious lung disease                                               [ ] Unexplained or recurrent                     (1 Point)           (in the previous month)                               (1 Point)  [ ] Abnormal pulmonary function test                     (1 Point)                 SURGERY RELATED RISK FACTORS  [ ] Acute myocardial infarction                              (1 Point)                 [ ]  Section                                             (1 Point)  [ ] Congestive heart failure (in the previous month)  (1 Point)               [ ] Minor surgery                                                  (1 Point)   [ ] Inflammatory bowel disease                             (1 Point)                 [ ] Arthroscopic surgery                                        (2 Points)  [ ] Central venous access                                      (2 Points)                [ ] General surgery lasting more than 45 minutes   (2 Points)       [ ] Stroke (in the previous month)                          (5 Points)               [ ] Elective arthroplasty                                         (5 Points)                                                                                                                                               HEMATOLOGY RELATED FACTORS                                                 TRAUMA RELATED RISK FACTORS  [ ] Prior episodes of VTE                                     (3 Points)                [ ] Fracture of the hip, pelvis, or leg                       (5 Points)  [ ] Positive family history for VTE                         (3 Points)                 [ ] Acute spinal cord injury (in the previous month)  (5 Points)  [ ] Prothrombin 47853 A                                     (3 Points)                 [ ] Paralysis  (less than 1 month)                             (5 Points)  [ ] Factor V Leiden                                             (3 Points)                  [ ] Multiple Trauma within 1 month                        (5 Points)  [ ] Lupus anticoagulants                                     (3 Points)                                                           [ ] Anticardiolipin antibodies                               (3 Points)                                                       [ ] High homocysteine in the blood                      (3 Points)                                             [ ] Other congenital or acquired thrombophilia      (3 Points)                                                [ ] Heparin induced thrombocytopenia                  (3 Points)                                          Total Score [  5    ]    Caprini Score 0 - 2:  Low Risk, No VTE Prophylaxis required for most patients, encourage ambulation  Caprini Score 3 - 6:  At Risk, pharmacologic VTE prophylaxis is indicated for most patients (in the absence of a contraindication)  Caprini Score Greater than or = 7:  High Risk, pharmacologic VTE prophylaxis is indicated for most patients (in the absence of a contraindication)

## 2020-03-23 NOTE — H&P ADULT - NSHPREVIEWOFSYSTEMS_GEN_ALL_CORE
Limited ROS due to confusion and restlessness    Pt denies chest pain and abdominal pain, complains of bilateral knee stiffness

## 2020-03-23 NOTE — H&P ADULT - NSHPLABSRESULTS_GEN_ALL_CORE
.  LABS:                         10.6   12.00 )-----------( 264      ( 23 Mar 2020 16:25 )             32.4         135  |  100  |  59<H>  ----------------------------<  175<H>  6.0<H>   |  15<L>  |  3.11<H>    Ca    <5.0<LL>      23 Mar 2020 16:25  Phos  4.9       Mg     1.3         TPro  7.2  /  Alb  3.0<L>  /  TBili  0.4  /  DBili  x   /  AST  95<H>  /  ALT  37  /  AlkPhos  258<H>      PT/INR - ( 23 Mar 2020 16:25 )   PT: 13.5 sec;   INR: 1.20 ratio         PTT - ( 23 Mar 2020 16:25 )  PTT:34.6 sec  Urinalysis Basic - ( 23 Mar 2020 16:25 )    Color: Yellow / Appearance: Clear / S.010 / pH: x  Gluc: x / Ketone: Negative  / Bili: Negative / Urobili: Negative   Blood: x / Protein: 500 mg/dL / Nitrite: Negative   Leuk Esterase: Negative / RBC: 5-10 /HPF / WBC 0-2 /HPF   Sq Epi: x / Non Sq Epi: Neg.-Few / Bacteria: Few /HPF      CARDIAC MARKERS ( 23 Mar 2020 17:10 )  <.017 ng/mL / x     / x     / x     / x            Lactate, Blood: 1.4 mmol/L ( @ 16:30)      RADIOLOGY, EKG & ADDITIONAL TESTS: Reviewed.     CXR (reviewed by me): no focal infiltrate, left sided pleural effusion, seen in prior CXR    ECG (reviwed by me) NSR, prolonged QTc (566, no peaked T)

## 2020-03-23 NOTE — ED ADULT TRIAGE NOTE - RESPIRATORY RATE (BREATHS/MIN)
GENERAL SURGERY DAILY PROGRESS NOTE:       Subjective: No acute events overnight. Patient reports feeling well, no nausea/vomiting. +flatus/+BM      Objective:  Vital Signs Last 24 Hrs  T(C): 36.7 (19 Apr 2019 13:49), Max: 37.3 (18 Apr 2019 19:57)  T(F): 98.1 (19 Apr 2019 13:49), Max: 99.1 (18 Apr 2019 19:57)  HR: 93 (19 Apr 2019 13:49) (78 - 93)  BP: 99/64 (19 Apr 2019 13:49) (90/50 - 128/71)  BP(mean): --  RR: 16 (19 Apr 2019 13:49) (16 - 18)  SpO2: 96% (19 Apr 2019 13:49) (96% - 100%)    I&O's Detail    18 Apr 2019 07:01  -  19 Apr 2019 07:00  --------------------------------------------------------  IN:    Oral Fluid: 250 mL  Total IN: 250 mL    OUT:    Voided: 1075 mL  Total OUT: 1075 mL    Total NET: -825 mL          MEDICATIONS  (STANDING):  allopurinol 300 milliGRAM(s) Oral daily  buDESOnide  80 MICROgram(s)/formoterol 4.5 MICROgram(s) Inhaler 2 Puff(s) Inhalation two times a day  dextrose 5%. 1000 milliLiter(s) (50 mL/Hr) IV Continuous <Continuous>  dextrose 50% Injectable 12.5 Gram(s) IV Push once  dextrose 50% Injectable 25 Gram(s) IV Push once  dextrose 50% Injectable 25 Gram(s) IV Push once  insulin lispro (HumaLOG) corrective regimen sliding scale   SubCutaneous three times a day before meals  insulin lispro (HumaLOG) corrective regimen sliding scale   SubCutaneous at bedtime  magnesium oxide 400 milliGRAM(s) Oral two times a day with meals  metoprolol succinate ER 25 milliGRAM(s) Oral two times a day  multivitamin 1 Tablet(s) Oral daily  pantoprazole  Injectable 40 milliGRAM(s) IV Push every 12 hours  sildenafil (REVATIO) 10 milliGRAM(s) Oral three times a day  sodium chloride 0.9% lock flush 3 milliLiter(s) IV Push every 8 hours  torsemide 20 milliGRAM(s) Oral <User Schedule>    MEDICATIONS  (PRN):  ALBUTerol/ipratropium for Nebulization 3 milliLiter(s) Nebulizer every 6 hours PRN Shortness of Breath and/or Wheezing  dextrose 40% Gel 15 Gram(s) Oral once PRN Blood Glucose LESS THAN 70 milliGRAM(s)/deciliter  glucagon  Injectable 1 milliGRAM(s) IntraMuscular once PRN Glucose LESS THAN 70 milligrams/deciliter  guaiFENesin  milliGRAM(s) Oral every 12 hours PRN Cough                            9.3    4.82  )-----------( 201      ( 19 Apr 2019 07:47 )             30.4       04-19    140  |  109<H>  |  59<H>  ----------------------------<  83  4.3   |  20<L>  |  1.90<H>    Ca    9.8      19 Apr 2019 07:47  Phos  2.7     04-18  Mg     1.8     04-19          General: NAD, well-nourished  HEENT: Atraumatic, EOMI  Resp: Breathing comfortably on RA  CV: Normal sinus rhythm  Abd: Obese, soft, nontender, nondistended. No abdominal scars. 24

## 2020-03-23 NOTE — ED ADULT NURSE NOTE - OBJECTIVE STATEMENT
Patient brought in via EMS from home for shortness of breath and cough. Denies any fevers, chest pain, palpitations or syncope. Denies recent travel or sick contacts.

## 2020-03-23 NOTE — ED PROVIDER NOTE - CARE PLAN
Patient's mother, Edwina, calling in regards to medication. States it is not working for the rosacea, states when she was in last Md explained this \"new medication\" for rosacea, they would like to try it. Would like to speak with Md or nurse.     Call [phone]   Principal Discharge DX:	Hypocalcemia  Secondary Diagnosis:	Metabolic acidosis

## 2020-03-24 LAB
24R-OH-CALCIDIOL SERPL-MCNC: 14.7 NG/ML — LOW (ref 30–80)
ALBUMIN SERPL ELPH-MCNC: 2.5 G/DL — LOW (ref 3.3–5)
ALBUMIN SERPL ELPH-MCNC: 2.7 G/DL — LOW (ref 3.3–5)
ALP SERPL-CCNC: 209 U/L — HIGH (ref 40–120)
ALP SERPL-CCNC: 240 U/L — HIGH (ref 40–120)
ALT FLD-CCNC: 34 U/L — SIGNIFICANT CHANGE UP (ref 10–45)
ALT FLD-CCNC: 36 U/L — SIGNIFICANT CHANGE UP (ref 10–45)
ANION GAP SERPL CALC-SCNC: 20 MMOL/L — HIGH (ref 5–17)
ANION GAP SERPL CALC-SCNC: 24 MMOL/L — HIGH (ref 5–17)
AST SERPL-CCNC: 108 U/L — HIGH (ref 10–40)
AST SERPL-CCNC: 98 U/L — HIGH (ref 10–40)
BASOPHILS # BLD AUTO: 0.02 K/UL — SIGNIFICANT CHANGE UP (ref 0–0.2)
BASOPHILS NFR BLD AUTO: 0.2 % — SIGNIFICANT CHANGE UP (ref 0–2)
BILIRUB SERPL-MCNC: 0.3 MG/DL — SIGNIFICANT CHANGE UP (ref 0.2–1.2)
BILIRUB SERPL-MCNC: 0.4 MG/DL — SIGNIFICANT CHANGE UP (ref 0.2–1.2)
BUN SERPL-MCNC: 63 MG/DL — HIGH (ref 7–23)
BUN SERPL-MCNC: 70 MG/DL — HIGH (ref 7–23)
CA-I BLD-SCNC: 0.55 MMOL/L — CRITICAL LOW (ref 1.12–1.3)
CALCIUM SERPL-MCNC: 3.7 MG/DL — CRITICAL LOW (ref 8.4–10.5)
CALCIUM SERPL-MCNC: <5 MG/DL — CRITICAL LOW (ref 8.4–10.5)
CALCIUM SERPL-MCNC: <5 MG/DL — CRITICAL LOW (ref 8.4–10.5)
CHLORIDE SERPL-SCNC: 103 MMOL/L — SIGNIFICANT CHANGE UP (ref 96–108)
CHLORIDE SERPL-SCNC: 103 MMOL/L — SIGNIFICANT CHANGE UP (ref 96–108)
CO2 BLDA-SCNC: 13 MMOL/L — LOW (ref 22–30)
CO2 SERPL-SCNC: 13 MMOL/L — LOW (ref 22–31)
CO2 SERPL-SCNC: 14 MMOL/L — LOW (ref 22–31)
CREAT SERPL-MCNC: 3.12 MG/DL — HIGH (ref 0.5–1.3)
CREAT SERPL-MCNC: 3.29 MG/DL — HIGH (ref 0.5–1.3)
CULTURE RESULTS: NO GROWTH — SIGNIFICANT CHANGE UP
EOSINOPHIL # BLD AUTO: 0.37 K/UL — SIGNIFICANT CHANGE UP (ref 0–0.5)
EOSINOPHIL NFR BLD AUTO: 3.4 % — SIGNIFICANT CHANGE UP (ref 0–6)
GAS PNL BLDA: SIGNIFICANT CHANGE UP
GLUCOSE BLDC GLUCOMTR-MCNC: 166 MG/DL — HIGH (ref 70–99)
GLUCOSE BLDC GLUCOMTR-MCNC: 187 MG/DL — HIGH (ref 70–99)
GLUCOSE BLDC GLUCOMTR-MCNC: 192 MG/DL — HIGH (ref 70–99)
GLUCOSE BLDC GLUCOMTR-MCNC: 267 MG/DL — HIGH (ref 70–99)
GLUCOSE SERPL-MCNC: 174 MG/DL — HIGH (ref 70–99)
GLUCOSE SERPL-MCNC: 265 MG/DL — HIGH (ref 70–99)
HBA1C BLD-MCNC: 5.6 % — SIGNIFICANT CHANGE UP (ref 4–5.6)
HCT VFR BLD CALC: 32 % — LOW (ref 39–50)
HGB BLD-MCNC: 10 G/DL — LOW (ref 13–17)
HOROWITZ INDEX BLDA+IHG-RTO: SIGNIFICANT CHANGE UP
IMM GRANULOCYTES NFR BLD AUTO: 0.9 % — SIGNIFICANT CHANGE UP (ref 0–1.5)
LYMPHOCYTES # BLD AUTO: 0.49 K/UL — LOW (ref 1–3.3)
LYMPHOCYTES # BLD AUTO: 4.5 % — LOW (ref 13–44)
MAGNESIUM SERPL-MCNC: 1.9 MG/DL — SIGNIFICANT CHANGE UP (ref 1.6–2.6)
MCHC RBC-ENTMCNC: 27.9 PG — SIGNIFICANT CHANGE UP (ref 27–34)
MCHC RBC-ENTMCNC: 31.3 GM/DL — LOW (ref 32–36)
MCV RBC AUTO: 89.4 FL — SIGNIFICANT CHANGE UP (ref 80–100)
MONOCYTES # BLD AUTO: 0.54 K/UL — SIGNIFICANT CHANGE UP (ref 0–0.9)
MONOCYTES NFR BLD AUTO: 4.9 % — SIGNIFICANT CHANGE UP (ref 2–14)
NEUTROPHILS # BLD AUTO: 9.46 K/UL — HIGH (ref 1.8–7.4)
NEUTROPHILS NFR BLD AUTO: 86.1 % — HIGH (ref 43–77)
NRBC # BLD: 0 /100 WBCS — SIGNIFICANT CHANGE UP (ref 0–0)
PCO2 BLDA: 29 MMHG — LOW (ref 32–46)
PH BLDA: 7.24 — LOW (ref 7.35–7.45)
PHOSPHATE SERPL-MCNC: 5.4 MG/DL — HIGH (ref 2.5–4.5)
PLATELET # BLD AUTO: 293 K/UL — SIGNIFICANT CHANGE UP (ref 150–400)
PO2 BLDA: 120 MMHG — HIGH (ref 74–108)
POTASSIUM SERPL-MCNC: 4.4 MMOL/L — SIGNIFICANT CHANGE UP (ref 3.5–5.3)
POTASSIUM SERPL-MCNC: 4.6 MMOL/L — SIGNIFICANT CHANGE UP (ref 3.5–5.3)
POTASSIUM SERPL-SCNC: 4.4 MMOL/L — SIGNIFICANT CHANGE UP (ref 3.5–5.3)
POTASSIUM SERPL-SCNC: 4.6 MMOL/L — SIGNIFICANT CHANGE UP (ref 3.5–5.3)
PROT SERPL-MCNC: 6.4 G/DL — SIGNIFICANT CHANGE UP (ref 6–8.3)
PROT SERPL-MCNC: 7 G/DL — SIGNIFICANT CHANGE UP (ref 6–8.3)
PTH-INTACT FLD-MCNC: 557 PG/ML — HIGH (ref 15–65)
RBC # BLD: 3.58 M/UL — LOW (ref 4.2–5.8)
RBC # FLD: 16.6 % — HIGH (ref 10.3–14.5)
SAO2 % BLDA: 97 % — HIGH (ref 92–96)
SARS-COV-2 RNA SPEC QL NAA+PROBE: SIGNIFICANT CHANGE UP
SODIUM SERPL-SCNC: 137 MMOL/L — SIGNIFICANT CHANGE UP (ref 135–145)
SODIUM SERPL-SCNC: 140 MMOL/L — SIGNIFICANT CHANGE UP (ref 135–145)
SPECIMEN SOURCE: SIGNIFICANT CHANGE UP
TSH SERPL-MCNC: 1.01 UIU/ML — SIGNIFICANT CHANGE UP (ref 0.27–4.2)
URATE SERPL-MCNC: 6.9 MG/DL — SIGNIFICANT CHANGE UP (ref 3.4–8.8)
VIT D25+D1,25 OH+D1,25 PNL SERPL-MCNC: 49.2 PG/ML — SIGNIFICANT CHANGE UP (ref 19.9–79.3)
WBC # BLD: 10.98 K/UL — HIGH (ref 3.8–10.5)
WBC # FLD AUTO: 10.98 K/UL — HIGH (ref 3.8–10.5)

## 2020-03-24 PROCEDURE — 99233 SBSQ HOSP IP/OBS HIGH 50: CPT

## 2020-03-24 PROCEDURE — 93010 ELECTROCARDIOGRAM REPORT: CPT

## 2020-03-24 PROCEDURE — 71045 X-RAY EXAM CHEST 1 VIEW: CPT | Mod: 26

## 2020-03-24 PROCEDURE — 93306 TTE W/DOPPLER COMPLETE: CPT | Mod: 26

## 2020-03-24 PROCEDURE — 51702 INSERT TEMP BLADDER CATH: CPT

## 2020-03-24 RX ORDER — CALCIUM GLUCONATE 100 MG/ML
2 VIAL (ML) INTRAVENOUS ONCE
Refills: 0 | Status: COMPLETED | OUTPATIENT
Start: 2020-03-24 | End: 2020-03-24

## 2020-03-24 RX ORDER — CALCITRIOL 0.5 UG/1
0.5 CAPSULE ORAL DAILY
Refills: 0 | Status: DISCONTINUED | OUTPATIENT
Start: 2020-03-24 | End: 2020-04-10

## 2020-03-24 RX ORDER — DIPHENHYDRAMINE HCL 50 MG
25 CAPSULE ORAL ONCE
Refills: 0 | Status: COMPLETED | OUTPATIENT
Start: 2020-03-24 | End: 2020-03-24

## 2020-03-24 RX ORDER — SODIUM BICARBONATE 1 MEQ/ML
650 SYRINGE (ML) INTRAVENOUS EVERY 8 HOURS
Refills: 0 | Status: DISCONTINUED | OUTPATIENT
Start: 2020-03-24 | End: 2020-03-24

## 2020-03-24 RX ORDER — CALCIUM GLUCONATE 100 MG/ML
1 VIAL (ML) INTRAVENOUS ONCE
Refills: 0 | Status: COMPLETED | OUTPATIENT
Start: 2020-03-24 | End: 2020-03-24

## 2020-03-24 RX ORDER — SODIUM BICARBONATE 1 MEQ/ML
0.15 SYRINGE (ML) INTRAVENOUS
Qty: 150 | Refills: 0 | Status: DISCONTINUED | OUTPATIENT
Start: 2020-03-24 | End: 2020-03-25

## 2020-03-24 RX ORDER — SODIUM BICARBONATE 1 MEQ/ML
50 SYRINGE (ML) INTRAVENOUS ONCE
Refills: 0 | Status: COMPLETED | OUTPATIENT
Start: 2020-03-24 | End: 2020-03-24

## 2020-03-24 RX ORDER — CALCIUM ACETATE 667 MG
1334 TABLET ORAL
Refills: 0 | Status: DISCONTINUED | OUTPATIENT
Start: 2020-03-24 | End: 2020-04-10

## 2020-03-24 RX ADMIN — Medication 0.25 MILLIGRAM(S): at 23:40

## 2020-03-24 RX ADMIN — Medication 200 GRAM(S): at 19:11

## 2020-03-24 RX ADMIN — Medication 100 GRAM(S): at 12:05

## 2020-03-24 RX ADMIN — Medication 75 MEQ/KG/HR: at 19:11

## 2020-03-24 RX ADMIN — Medication 200 GRAM(S): at 00:26

## 2020-03-24 RX ADMIN — Medication 81 MILLIGRAM(S): at 12:05

## 2020-03-24 RX ADMIN — Medication 100 GRAM(S): at 23:38

## 2020-03-24 RX ADMIN — HEPARIN SODIUM 5000 UNIT(S): 5000 INJECTION INTRAVENOUS; SUBCUTANEOUS at 23:43

## 2020-03-24 RX ADMIN — Medication 200 GRAM(S): at 15:52

## 2020-03-24 RX ADMIN — Medication 25 MILLIGRAM(S): at 12:50

## 2020-03-24 RX ADMIN — HEPARIN SODIUM 5000 UNIT(S): 5000 INJECTION INTRAVENOUS; SUBCUTANEOUS at 05:05

## 2020-03-24 RX ADMIN — SERTRALINE 100 MILLIGRAM(S): 25 TABLET, FILM COATED ORAL at 12:05

## 2020-03-24 RX ADMIN — Medication 1334 MILLIGRAM(S): at 16:59

## 2020-03-24 RX ADMIN — BUPROPION HYDROCHLORIDE 150 MILLIGRAM(S): 150 TABLET, EXTENDED RELEASE ORAL at 12:05

## 2020-03-24 RX ADMIN — Medication 40 MILLIGRAM(S): at 05:05

## 2020-03-24 RX ADMIN — AMLODIPINE BESYLATE 5 MILLIGRAM(S): 2.5 TABLET ORAL at 05:05

## 2020-03-24 RX ADMIN — Medication 1 TABLET(S): at 12:05

## 2020-03-24 RX ADMIN — Medication 0.5 MILLIGRAM(S): at 16:22

## 2020-03-24 RX ADMIN — Medication 3: at 16:38

## 2020-03-24 RX ADMIN — Medication 0.25 MILLIGRAM(S): at 12:50

## 2020-03-24 RX ADMIN — ATORVASTATIN CALCIUM 10 MILLIGRAM(S): 80 TABLET, FILM COATED ORAL at 23:43

## 2020-03-24 RX ADMIN — Medication 1300 MILLIGRAM(S): at 16:37

## 2020-03-24 RX ADMIN — Medication 1300 MILLIGRAM(S): at 05:05

## 2020-03-24 RX ADMIN — Medication 1: at 08:53

## 2020-03-24 RX ADMIN — Medication 1: at 12:05

## 2020-03-24 RX ADMIN — HEPARIN SODIUM 5000 UNIT(S): 5000 INJECTION INTRAVENOUS; SUBCUTANEOUS at 14:00

## 2020-03-24 RX ADMIN — Medication 50 MILLIEQUIVALENT(S): at 19:11

## 2020-03-24 RX ADMIN — ALBUTEROL 2 PUFF(S): 90 AEROSOL, METERED ORAL at 15:35

## 2020-03-24 RX ADMIN — ALBUTEROL 2 PUFF(S): 90 AEROSOL, METERED ORAL at 10:33

## 2020-03-24 NOTE — PROCEDURE NOTE - NSURITECHNIQUE_GU_A_CORE
Sterile gloves were worn for the duration of the procedure/A sterile drape was used to cover all adjacent areas/The urinary drainage system is closed at the end of the procedure/The collection bag is below the level of the patient and urinary bladder/The site was cleaned with soap/water and sterile solution (betadine)/The catheter was secured with a securement device (e.g. StatLock)/All applicable medical record documentation is completed/Proper hand hygiene was performed/The catheter was appropriately lubricated

## 2020-03-24 NOTE — PROGRESS NOTE ADULT - ASSESSMENT
85 yo M with PMHx of dementia, DM, HTN, depression, CKD4, COPD presents to the ED with difficulty breathing and progressive confusion.    # SOB likely multifactorial, Acute copd exacerbation/ Anxiety. r/o COVID-19. r/o gram negative PNA  - COVID- pending  - hodl solumedrol until COVID comes back  - albuterol q6  - ativan 0.25mg q8 prn  - Levaquin 500mg   - follow up with pulmonary  - follow up Speech_swallow eval  - CXR: patchy infiltrates in RLL    #Metabolic encephalopathy: 2/2 multiple electrolytes abnormalities likely 2/2 Xgeva (denosumab) and PABLITO on CKD.   - correct electrolytes as below  - trend BMP  - aspiration precautions    #Hypocalcemia: corrected Ca: 6, likely 2/2 Denosumab in patient with existing CKD  - prolonged QTc on ECG (566) on admission  - s/p IV calcium gluconate   - repeat ECG in AM  - check PTH, vitamin D levels  - admit to telemetry  - trend BMP    #Hypomagnesemia and hyperkalemia   - resovled    #PABLITO on CKD stage 4 and metabolic acidosis:  - follow up renal consult  - hold nephrotoxic meds  - continue sodium bicarb 1300mg q12  - trend BMP    #Hyperkalemia resolved.     #HTN:  - continue amlodipine  - will hold BBlocker due to hyperkalemia    #Depression:  - continue Sertraline and buproprion (home dose buproprion 100mg BID, pharmacy here only has 150mg XL)    #DVT ppx:  - HSQ    #Goals of care:  - admitting physician spoke with pt's daughter, Lily regarding code status. Pt is a Full code. 85 yo M with PMHx of dementia, DM, HTN, depression, CKD4, COPD presents to the ED with difficulty breathing and progressive confusion.    # SOB likely multifactorial, Acute copd exacerbation/ Anxiety. r/o COVID-19. r/o gram negative PNA  - COVID- pending  - hodl solumedrol until COVID comes back  - albuterol q6  - ativan 0.25mg q8 prn  - Levaquin 500mg   - follow up with pulmonary  - follow up Speech_swallow eval  - CXR: patchy infiltrates in RLL    #Metabolic encephalopathy: 2/2 multiple electrolytes abnormalities likely 2/2 Xgeva (denosumab) and PABLITO on CKD.   - correct electrolytes as below  - trend BMP  - aspiration precautions    #Hypocalcemia: corrected Ca: 6, likely 2/2 Denosumab vs secondary hyperparathyroidism   - prolonged QTc on ECG (566) on admission  - s/p IV calcium gluconate   - repeat ECG in AM  - . vitamin D 25: low. vit D 1,25: normal   - cont tele box    #Hypomagnesemia and hyperkalemia   - resovled    #PABLITO on CKD stage 4 and metabolic acidosis:  - follow up renal consult  - hold nephrotoxic meds  - continue sodium bicarb 1300mg q12  - trend BMP    #Hyperkalemia resolved.     #HTN:  - continue amlodipine  - will hold BBlocker due to hyperkalemia    #Depression:  - continue Sertraline and buproprion (home dose buproprion 100mg BID, pharmacy here only has 150mg XL)    #DVT ppx:  - HSQ    #Goals of care:  - admitting physician spoke with pt's daughter, Lily regarding code status. Pt is a Full code. 85 yo M with PMHx of dementia, DM, HTN, depression, CKD4, COPD presents to the ED with difficulty breathing and progressive confusion.    # SOB likely multifactorial, Acute copd exacerbation/ Anxiety. r/o COVID-19. r/o gram negative PNA  - COVID- pending  - hodl solumedrol until COVID comes back  - albuterol q6  - ativan 0.25mg q8 prn  - Levaquin 500mg   - follow up with pulmonary  - follow up Speech_swallow eval  - CXR: patchy infiltrates in RLL    #Metabolic encephalopathy: 2/2 multiple electrolytes abnormalities likely 2/2 Xgeva (denosumab) and PABLITO on CKD.   - correct electrolytes as below  - trend BMP  - aspiration precautions    #Hypocalcemia: corrected Ca: 6, likely 2/2 Denosumab vs secondary hyperparathyroidism   - prolonged QTc on ECG (566) on admission  - s/p IV calcium gluconate   - repeat ECG in AM  - . vitamin D 25: low. vit D 1,25: normal   - cont tele box    #Hypomagnesemia and hyperkalemia   - resovled    #PABLITO on CKD stage 4 and high anionic gap metabolic acidosis:  - follow up renal consult  - hold nephrotoxic meds  - continue sodium bicarb 1300mg q12  - trend BMP    # Urinary retention  - start flomax   - hernández catheter started today  - urology consult     #HTN:  - continue amlodipine  - will hold BBlocker due to hyperkalemia    #Depression:  - continue Sertraline and buproprion (home dose buproprion 100mg BID, pharmacy here only has 150mg XL)    #DVT ppx:  - HSQ    #Goals of care:  - admitting physician spoke with pt's daughter, Lily regarding code status. Pt is a Full code. 87 yo M with PMHx of dementia, DM, HTN, depression, CKD4, COPD presents to the ED with difficulty breathing and progressive confusion.    # SOB likely multifactorial, Acute copd exacerbation/ Anxiety. r/o COVID-19. r/o gram negative PNA  - COVID- pending  - hodl solumedrol until COVID comes back  - albuterol q6  - ativan 0.25mg q8 prn  - Levaquin 500mg   - follow up with pulmonary  - follow up Speech_swallow eval  - CXR: patchy infiltrates in RLL    #Metabolic encephalopathy: 2/2 multiple electrolytes abnormalities likely 2/2 Xgeva (denosumab) and PABLITO on CKD.   - correct electrolytes as below  - trend BMP  - aspiration precautions    #Hypocalcemia: corrected Ca: 6, likely 2/2 Denosumab vs secondary hyperparathyroidism   - prolonged QTc on ECG (566) on admission  - s/p IV calcium gluconate   - repeat ECG in AM  - . vitamin D 25: low. vit D 1,25: normal   - cont tele box    #Hypomagnesemia and hyperkalemia   - resovled    #PABLITO on CKD stage 4 and high anionic gap metabolic acidosis:  - follow up renal consult  - hold nephrotoxic meds  - continue sodium bicarb 1300mg q12  - trend BMP    # Urinary retention  - start flomax   - hernández catheter started today    #HTN:  - continue amlodipine  - will hold BBlocker due to hyperkalemia    #Depression:  - continue Sertraline and buproprion (home dose buproprion 100mg BID, pharmacy here only has 150mg XL)    #DVT ppx:  - HSQ    #Goals of care:  - admitting physician spoke with pt's daughter, Lily regarding code status. Pt is a Full code. 85 yo M with PMHx of dementia, DM, HTN, depression, CKD4, COPD presents to the ED with difficulty breathing and progressive confusion.    # SOB likely multifactorial, Acute copd exacerbation/ metabolic acidosis. r/o COVID-19. r/o gram negative PNA  - COVID- pending  - hodl solumedrol until COVID comes back  - albuterol q6  - ativan 0.25mg q8 prn  - Levaquin 500mg   - follow up with pulmonary  - follow up Speech_swallow eval  - CXR: patchy infiltrates in RLL    #Metabolic encephalopathy: 2/2 multiple electrolytes abnormalities likely 2/2 Xgeva (denosumab) and PABLITO on CKD.   - correct electrolytes as below  - trend BMP  - aspiration precautions    #Hypocalcemia: corrected Ca: 6, likely 2/2 Denosumab vs secondary hyperparathyroidism   - prolonged QTc on ECG (566) on admission  - s/p IV calcium gluconate   - repeat ECG in AM  - . vitamin D 25: low. vit D 1,25: normal   - cont tele box    #Hypomagnesemia and hyperkalemia   - resovled    #PABLITO on CKD stage 4 and high anionic gap metabolic acidosis:  - follow up renal consult  - hold nephrotoxic meds  - continue sodium bicarb 1300mg q12  - trend BMP    # Urinary retention  - start flomax   - hernández catheter started today    #HTN:  - continue amlodipine  - will hold BBlocker due to hyperkalemia    #Depression:  - continue Sertraline and buproprion (home dose buproprion 100mg BID, pharmacy here only has 150mg XL)    #DVT ppx:  - HSQ    #Goals of care:  - admitting physician spoke with pt's daughter, Lily regarding code status. Pt is a Full code.

## 2020-03-24 NOTE — CONSULT NOTE ADULT - SUBJECTIVE AND OBJECTIVE BOX
NEPHROLOGY CONSULTATION    CHIEF COMPLAINT: SOB    HPI:  Pt is 87 yo M with PMH of dementia, DM, HTN, depression, CKD 4 (Cr 2.8 - 19), COPD presented to the ED 3/23 with difficulty breathing and progressive confusion. History obtained via chart as pt is unable to provide history and unable to reach daughter via phone at this time. ED staff spoke with daughter on adm who reported that pt had always had shortness of breath due to COPD but since Saturday it has gotten progressively worse with intermittent wheezing. In January, they found out that he has advanced prostate cancer. He has tried multiple medications, but on 3/12 he had an injection of Xgeva. Since then, he has been generally weak and not responding well. Starting on Thursday, he started to have decreased PO, got progressively weaker and unable to ambulate even with his walker. No reports of fevers, vomiting, diarrhea, chest pain or other complaints. No sick contacts or recent travel. Noted to have severe hypocalcemia.    ROS:  as above    Allergies:  Aricept (Other)  Cipro (Unknown)  Demerol HCl (Unknown)    PAST MEDICAL & SURGICAL HISTORY:  History of prostate cancer  Dementia  CKD 4  H/O: obesity  COPD (chronic obstructive pulmonary disease)  Depression  Hyperlipemia  H/O: hypertension  Diabetes mellitus  Hx of coronary angiogram  Inguinal hernia    SOCIAL HISTORY:  negative    FAMILY HISTORY:  No pertinent family history in first degree relatives    MEDICATIONS  (STANDING):  ALBUTerol    90 MICROgram(s) HFA Inhaler 2 Puff(s) Inhalation every 6 hours  amLODIPine   Tablet 5 milliGRAM(s) Oral daily  aspirin enteric coated 81 milliGRAM(s) Oral daily  atorvastatin 10 milliGRAM(s) Oral at bedtime  buPROPion XL . 150 milliGRAM(s) Oral daily  calcium carbonate 1250 mG  + Vitamin D (OsCal 500 + D) 1 Tablet(s) Oral daily  calcium gluconate IVPB 2 Gram(s) IV Intermittent once  dextrose 5%. 1000 milliLiter(s) (50 mL/Hr) IV Continuous <Continuous>  dextrose 50% Injectable 12.5 Gram(s) IV Push once  dextrose 50% Injectable 25 Gram(s) IV Push once  dextrose 50% Injectable 25 Gram(s) IV Push once  heparin  Injectable 5000 Unit(s) SubCutaneous every 8 hours  insulin lispro (HumaLOG) corrective regimen sliding scale   SubCutaneous three times a day before meals  insulin lispro (HumaLOG) corrective regimen sliding scale   SubCutaneous at bedtime  methylPREDNISolone sodium succinate Injectable 40 milliGRAM(s) IV Push daily  sertraline 100 milliGRAM(s) Oral daily  sodium bicarbonate 1300 milliGRAM(s) Oral two times a day    Vital Signs Last 24 Hrs  T(C): 36.6 (20 @ 12:25), Max: 36.7 (20 @ 16:05)  T(F): 97.8 (20 @ 12:25), Max: 98 (20 @ 16:05)  HR: 77 (20 @ 12:25) (67 - 88)  BP: 144/66 (20 @ 12:25) (123/70 - 149/70)  RR: 22 (20 @ 12:25) (19 - 24)  SpO2: 100% (20 @ 12:25) (96% - 100%)    I&O's Summary    24 Mar 2020 07:01  -  24 Mar 2020 15:27  --------------------------------------------------------  IN: 510 mL / OUT: 580 mL / NET: -70 mL    LABS:                        10.0   10.98 )-----------( 293      ( 24 Mar 2020 07:30 )             32.0     03-24    140  |  103  |  63<H>  ----------------------------<  174<H>  4.4   |  13<L>  |  3.12<H>    Ca    <5.0<LL>      24 Mar 2020 07:30  Phos  5.4     -24  Mg     1.9     -24    TPro  7.0  /  Alb  2.7<L>  /  TBili  0.4  /  DBili  x   /  AST  98<H>  /  ALT  34  /  AlkPhos  240<H>  03-24    Urinalysis Basic - ( 23 Mar 2020 16:25 )    Color: Yellow / Appearance: Clear / S.010 / pH: x  Gluc: x / Ketone: Negative  / Bili: Negative / Urobili: Negative   Blood: x / Protein: 500 mg/dL / Nitrite: Negative   Leuk Esterase: Negative / RBC: 5-10 /HPF / WBC 0-2 /HPF   Sq Epi: x / Non Sq Epi: Neg.-Few / Bacteria: Few /HPF    LIVER FUNCTIONS - ( 24 Mar 2020 07:30 )  Alb: 2.7 g/dL / Pro: 7.0 g/dL / ALK PHOS: 240 U/L / ALT: 34 U/L / AST: 98 U/L / GGT: x           PT/INR - ( 23 Mar 2020 16:25 )   PT: 13.5 sec;   INR: 1.20 ratio       PTT - ( 23 Mar 2020 16:25 )  PTT:34.6 sec    A/P:    Severe hypocalcemia w/QT prolongation in 87yo pt w/CKD 4, s/p Denosumab 3/  Low Ca due to Denosumab exacerbated by bone disorder of advanced CKD w/low PTH and hyperphosphatemia  Mg is normal  Phoslo 2tabs PO TID  Calcitriol PO  IV Ca Gluconate  F/u CMP, Ca and iCa level BID  Would avoid Denosumab in the future given CKD 4  Continue Na Bicarb for metabolic acidosis  Will check tumor lysis labs NEPHROLOGY CONSULTATION    CHIEF COMPLAINT: SOB    HPI:  Pt is 85 yo M with PMH of dementia, DM, HTN, depression, CKD 4 (Cr 2.8 - 19), COPD presented to the ED 3/23 with difficulty breathing and progressive confusion. History obtained via chart as pt is unable to provide history and unable to reach daughter via phone at this time. ED staff spoke with daughter on adm who reported that pt had always had shortness of breath due to COPD but since Saturday it has gotten progressively worse with intermittent wheezing. In January, they found out that he has advanced prostate cancer. He has tried multiple medications, but on 3/12 he had an injection of Xgeva. Since then, he has been generally weak and not responding well. Starting on Thursday, he started to have decreased PO, got progressively weaker and unable to ambulate even with his walker. No reports of fevers, vomiting, diarrhea, chest pain or other complaints. No sick contacts or recent travel. Noted to have severe hypocalcemia.    ROS:  as above    Allergies:  Aricept (Other)  Cipro (Unknown)  Demerol HCl (Unknown)    PAST MEDICAL & SURGICAL HISTORY:  History of prostate cancer  Dementia  CKD 4  H/O: obesity  COPD (chronic obstructive pulmonary disease)  Depression  Hyperlipemia  H/O: hypertension  Diabetes mellitus  Hx of coronary angiogram  Inguinal hernia    SOCIAL HISTORY:  negative    FAMILY HISTORY:  No pertinent family history in first degree relatives    MEDICATIONS  (STANDING):  ALBUTerol    90 MICROgram(s) HFA Inhaler 2 Puff(s) Inhalation every 6 hours  amLODIPine   Tablet 5 milliGRAM(s) Oral daily  aspirin enteric coated 81 milliGRAM(s) Oral daily  atorvastatin 10 milliGRAM(s) Oral at bedtime  buPROPion XL . 150 milliGRAM(s) Oral daily  calcium carbonate 1250 mG  + Vitamin D (OsCal 500 + D) 1 Tablet(s) Oral daily  calcium gluconate IVPB 2 Gram(s) IV Intermittent once  dextrose 5%. 1000 milliLiter(s) (50 mL/Hr) IV Continuous <Continuous>  dextrose 50% Injectable 12.5 Gram(s) IV Push once  dextrose 50% Injectable 25 Gram(s) IV Push once  dextrose 50% Injectable 25 Gram(s) IV Push once  heparin  Injectable 5000 Unit(s) SubCutaneous every 8 hours  insulin lispro (HumaLOG) corrective regimen sliding scale   SubCutaneous three times a day before meals  insulin lispro (HumaLOG) corrective regimen sliding scale   SubCutaneous at bedtime  methylPREDNISolone sodium succinate Injectable 40 milliGRAM(s) IV Push daily  sertraline 100 milliGRAM(s) Oral daily  sodium bicarbonate 1300 milliGRAM(s) Oral two times a day    Vital Signs Last 24 Hrs  T(C): 36.6 (20 @ 12:25), Max: 36.7 (20 @ 16:05)  T(F): 97.8 (20 @ 12:25), Max: 98 (20 @ 16:05)  HR: 77 (20 @ 12:25) (67 - 88)  BP: 144/66 (20 @ 12:25) (123/70 - 149/70)  RR: 22 (20 @ 12:25) (19 - 24)  SpO2: 100% (20 @ 12:25) (96% - 100%)    I&O's Summary    24 Mar 2020 07:01  -  24 Mar 2020 15:27  --------------------------------------------------------  IN: 510 mL / OUT: 580 mL / NET: -70 mL    LABS:                        10.0   10.98 )-----------( 293      ( 24 Mar 2020 07:30 )             32.0     03-24    140  |  103  |  63<H>  ----------------------------<  174<H>  4.4   |  13<L>  |  3.12<H>    Ca    <5.0<LL>      24 Mar 2020 07:30  Phos  5.4     -24  Mg     1.9     -24    TPro  7.0  /  Alb  2.7<L>  /  TBili  0.4  /  DBili  x   /  AST  98<H>  /  ALT  34  /  AlkPhos  240<H>  03-24    Urinalysis Basic - ( 23 Mar 2020 16:25 )    Color: Yellow / Appearance: Clear / S.010 / pH: x  Gluc: x / Ketone: Negative  / Bili: Negative / Urobili: Negative   Blood: x / Protein: 500 mg/dL / Nitrite: Negative   Leuk Esterase: Negative / RBC: 5-10 /HPF / WBC 0-2 /HPF   Sq Epi: x / Non Sq Epi: Neg.-Few / Bacteria: Few /HPF    LIVER FUNCTIONS - ( 24 Mar 2020 07:30 )  Alb: 2.7 g/dL / Pro: 7.0 g/dL / ALK PHOS: 240 U/L / ALT: 34 U/L / AST: 98 U/L / GGT: x           PT/INR - ( 23 Mar 2020 16:25 )   PT: 13.5 sec;   INR: 1.20 ratio       PTT - ( 23 Mar 2020 16:25 )  PTT:34.6 sec    A/P:    Severe hypocalcemia w/QT prolongation in 85yo pt w/CKD 4, s/p Denosumab 3/  Low Ca due to Denosumab exacerbated by bone disorder of advanced CKD w/low PTH and hyperphosphatemia  Mg is normal  Phoslo 2tabs PO TID  Calcitriol PO  IV Ca Gluconate  F/u CMP, Ca and iCa level BID  Would avoid Denosumab in the future given CKD 4  Continue Na Bicarb for metabolic acidosis  Will check tumor lysis labs  D/w medicine NEPHROLOGY CONSULTATION    CHIEF COMPLAINT: SOB    HPI:  Pt is 87 yo M with PMH of dementia, DM, HTN, depression, CKD 4 (Cr 2.8 - 19), COPD presented to the ED 3/23 with difficulty breathing and progressive confusion. History obtained via chart as pt is unable to provide history and unable to reach daughter via phone at this time. ED staff spoke with daughter on adm who reported that pt had always had shortness of breath due to COPD but since Saturday it has gotten progressively worse with intermittent wheezing. In January, they found out that he has advanced prostate cancer. He has tried multiple medications, but on 3/12 he had an injection of Xgeva. Since then, he has been generally weak and not responding well. Starting on Thursday, he started to have decreased PO, got progressively weaker and unable to ambulate even with his walker. No reports of fevers, vomiting, diarrhea, chest pain or other complaints. No sick contacts or recent travel. Noted to have severe hypocalcemia.    ROS:  as above    Allergies:  Aricept (Other)  Cipro (Unknown)  Demerol HCl (Unknown)    PAST MEDICAL & SURGICAL HISTORY:  History of prostate cancer  Dementia  CKD 4  H/O: obesity  COPD (chronic obstructive pulmonary disease)  Depression  Hyperlipemia  H/O: hypertension  Diabetes mellitus  Hx of coronary angiogram  Inguinal hernia    SOCIAL HISTORY:  negative    FAMILY HISTORY:  No pertinent family history in first degree relatives    MEDICATIONS  (STANDING):  ALBUTerol    90 MICROgram(s) HFA Inhaler 2 Puff(s) Inhalation every 6 hours  amLODIPine   Tablet 5 milliGRAM(s) Oral daily  aspirin enteric coated 81 milliGRAM(s) Oral daily  atorvastatin 10 milliGRAM(s) Oral at bedtime  buPROPion XL . 150 milliGRAM(s) Oral daily  calcium carbonate 1250 mG  + Vitamin D (OsCal 500 + D) 1 Tablet(s) Oral daily  calcium gluconate IVPB 2 Gram(s) IV Intermittent once  dextrose 5%. 1000 milliLiter(s) (50 mL/Hr) IV Continuous <Continuous>  dextrose 50% Injectable 12.5 Gram(s) IV Push once  dextrose 50% Injectable 25 Gram(s) IV Push once  dextrose 50% Injectable 25 Gram(s) IV Push once  heparin  Injectable 5000 Unit(s) SubCutaneous every 8 hours  insulin lispro (HumaLOG) corrective regimen sliding scale   SubCutaneous three times a day before meals  insulin lispro (HumaLOG) corrective regimen sliding scale   SubCutaneous at bedtime  methylPREDNISolone sodium succinate Injectable 40 milliGRAM(s) IV Push daily  sertraline 100 milliGRAM(s) Oral daily  sodium bicarbonate 1300 milliGRAM(s) Oral two times a day    Vital Signs Last 24 Hrs  T(C): 36.6 (20 @ 12:25), Max: 36.7 (20 @ 16:05)  T(F): 97.8 (20 @ 12:25), Max: 98 (20 @ 16:05)  HR: 77 (20 @ 12:25) (67 - 88)  BP: 144/66 (20 @ 12:25) (123/70 - 149/70)  RR: 22 (20 @ 12:25) (19 - 24)  SpO2: 100% (20 @ 12:25) (96% - 100%)    I&O's Summary    24 Mar 2020 07:01  -  24 Mar 2020 15:27  --------------------------------------------------------  IN: 510 mL / OUT: 580 mL / NET: -70 mL    LABS:                        10.0   10.98 )-----------( 293      ( 24 Mar 2020 07:30 )             32.0     03-24    140  |  103  |  63<H>  ----------------------------<  174<H>  4.4   |  13<L>  |  3.12<H>    Ca    <5.0<LL>      24 Mar 2020 07:30  Phos  5.4     -24  Mg     1.9     -24    TPro  7.0  /  Alb  2.7<L>  /  TBili  0.4  /  DBili  x   /  AST  98<H>  /  ALT  34  /  AlkPhos  240<H>  03-24    Urinalysis Basic - ( 23 Mar 2020 16:25 )    Color: Yellow / Appearance: Clear / S.010 / pH: x  Gluc: x / Ketone: Negative  / Bili: Negative / Urobili: Negative   Blood: x / Protein: 500 mg/dL / Nitrite: Negative   Leuk Esterase: Negative / RBC: 5-10 /HPF / WBC 0-2 /HPF   Sq Epi: x / Non Sq Epi: Neg.-Few / Bacteria: Few /HPF    LIVER FUNCTIONS - ( 24 Mar 2020 07:30 )  Alb: 2.7 g/dL / Pro: 7.0 g/dL / ALK PHOS: 240 U/L / ALT: 34 U/L / AST: 98 U/L / GGT: x           PT/INR - ( 23 Mar 2020 16:25 )   PT: 13.5 sec;   INR: 1.20 ratio       PTT - ( 23 Mar 2020 16:25 )  PTT:34.6 sec    A/P:    Hx COPD, CKD 4 (Cr 2.8 - 19), metastatic prostate ca  COPD exacerbation, r/o COVID 19, possibly lung mets  Severe hypocalcemia w/QT prolongation due to Denosumab (got on 3/12) exacerbated by bone disorder of advanced CKD w/low PTH and hyperphosphatemia  Mg is normal  Phoslo 2tabs PO TID  Calcitriol PO  IV Ca Gluconate  F/u Ca and iCa level BID, f/u CMP  Would avoid Denosumab in the future given CKD 4  Continue Na Bicarb for metabolic acidosis  Will check tumor lysis labs  D/w medicine

## 2020-03-24 NOTE — CONSULT NOTE ADULT - SUBJECTIVE AND OBJECTIVE BOX
PULMONARY CONSULT  [facility] of Patient : [facility] [address] ([facility]  Attending requesting Consult:Roderick Man  Chief Complaint :     Reason For consult :      Initial HPI on admission:  HPI:  85 yo M with PMHx of dementia, DM, HTN, depression, CKD, COPD presents to the ED with difficulty breathing and progressive confusion. History obtained via chart and ED staff as pt is unable to provide history and unable to reach daughter via phone at this time. ED staff spoke with daughter earlier who reports that pt had always had shortness of breath due to COPD but since Saturday it has gotten progressively worse with intermittent wheezing. In January, they found out that he has advanced prostate cancer. He has tried multiple medications, but on 3/12 he had an injection of Xgeva. Since then, he has been generally weak and not responding well. Starting on Thursday, he started to have hallucinations, decreased PO, and progressively weaker and unable to ambulate even with his walker. No reports of fevers, vomiting, diarrhea, chest pain or other complaints. No sick contacts or recent travel. (23 Mar 2020 18:23)      BRIEF HOSPITAL COURSE: ***    PAST MEDICAL & SURGICAL HISTORY:  History of prostate cancer  Dementia  chronic renal disease  Dyslipidemia  H/O: obesity  COPD (chronic obstructive pulmonary disease)  Depression  Hyperlipemia  H/O: hypertension  Diabetes mellitus  Hx of coronary angiogram  Inguinal hernia    Allergies    Aricept (Other)  Cipro (Unknown)  Demerol HCl (Unknown)    Intolerances      FAMILY HISTORY:  No pertinent family history in first degree relatives    Social history:      Smoking:     Drinking:     Drug use:    Review of Systems: as stated above    CONSTITUTIONAL: No fever, No chills, No fatigue  EYES: No eye pain, No visual disturbances, No discharge  ENMT:  No difficulty hearing, No tinnitus, No vertigo; No sinus or throat pain  NECK: No pain, No stiffness  RESPIRATORY: No Cough, No SOB, No Secretions  CARDIOVASCULAR: No chest pain, No palpitations, No dizziness, or No leg swelling  GASTROINTESTINAL: No abdominal or epigastric pain. No nausea, No vomiting, No hematemesis; No diarrhea, No constipation. No melena, No hematochezia.  GENITOURINARY: No dysuria, No frequency, No hematuria, No incontinence  NEUROLOGICAL: No headaches, No memory loss, No loss of strength, No numbness, No tremors  SKIN: No itching, No burning, No rashes, No lesions   MUSCULOSKELETAL: No joint pain or swelling; No muscle, back, No extremity pain  PSYCHIATRIC: No depression, No anxiety, No mood swings, No difficulty sleeping      Medications:  MEDICATIONS  (STANDING):  ALBUTerol    90 MICROgram(s) HFA Inhaler 2 Puff(s) Inhalation every 6 hours  amLODIPine   Tablet 5 milliGRAM(s) Oral daily  aspirin enteric coated 81 milliGRAM(s) Oral daily  atorvastatin 10 milliGRAM(s) Oral at bedtime  buPROPion XL . 150 milliGRAM(s) Oral daily  calcium carbonate 1250 mG  + Vitamin D (OsCal 500 + D) 1 Tablet(s) Oral daily  calcium gluconate IVPB 2 Gram(s) IV Intermittent once  dextrose 5%. 1000 milliLiter(s) (50 mL/Hr) IV Continuous <Continuous>  dextrose 50% Injectable 12.5 Gram(s) IV Push once  dextrose 50% Injectable 25 Gram(s) IV Push once  dextrose 50% Injectable 25 Gram(s) IV Push once  heparin  Injectable 5000 Unit(s) SubCutaneous every 8 hours  insulin lispro (HumaLOG) corrective regimen sliding scale   SubCutaneous three times a day before meals  insulin lispro (HumaLOG) corrective regimen sliding scale   SubCutaneous at bedtime  methylPREDNISolone sodium succinate Injectable 40 milliGRAM(s) IV Push daily  sertraline 100 milliGRAM(s) Oral daily  sodium bicarbonate 1300 milliGRAM(s) Oral two times a day    MEDICATIONS  (PRN):  acetaminophen   Tablet .. 650 milliGRAM(s) Oral every 4 hours PRN Mild Pain (1 - 3)  dextrose 40% Gel 15 Gram(s) Oral once PRN Blood Glucose LESS THAN 70 milliGRAM(s)/deciliter  glucagon  Injectable 1 milliGRAM(s) IntraMuscular once PRN Glucose LESS THAN 70 milligrams/deciliter      Home Medications:  Last Order Reconciliation Date: 20 @ 18:33 (Admission Reconciliation)  amLODIPine 5 mg oral tablet: 1 tab(s) orally once a day (19 @ 14:32)  aspirin 81 mg oral tablet, chewable: 1 tab(s) orally once a day (19 @ 14:32)  atorvastatin 10 mg oral tablet: 1 tab(s) orally once a day (19 @ 14:32)  buPROPion 100 mg oral tablet: 1 tab(s) orally 2 times a day (19 @ 14:33)  Bystolic 5 mg oral tablet: 1 tab(s) orally once a day (19 @ 14:36)  galantamine 4 mg oral tablet: 1 tab(s) orally 2 times a day (19 @ 14:33)  glimepiride 1 mg oral tablet: 1 tab(s) orally once a day (19 @ 14:38)  repaglinide 1 mg oral tablet: 1 tab(s) orally 3 times a day (before meals) (19 @ 14:34)  sertraline 100 mg oral tablet: 1 tab(s) orally once a day (19 @ 14:37)  sodium bicarbonate: 1300 milligram(s) orally 2 times a day (19 @ 14:36)      LABS:                        10.0   10.98 )-----------( 293      ( 24 Mar 2020 07:30 )             32.0     03-24    140  |  103  |  63<H>  ----------------------------<  174<H>  4.4   |  13<L>  |  3.12<H>    Ca    <5.0<LL>      24 Mar 2020 07:30  Phos  5.4     03-24  Mg     1.9     03-24    TPro  7.0  /  Alb  2.7<L>  /  TBili  0.4  /  DBili  x   /  AST  98<H>  /  ALT  34  /  AlkPhos  240<H>  03-24      CARDIAC MARKERS ( 23 Mar 2020 17:10 )  <.017 ng/mL / x     / x     / x     / x            PT/INR - ( 23 Mar 2020 16:25 )   PT: 13.5 sec;   INR: 1.20 ratio         PTT - ( 23 Mar 2020 16:25 )  PTT:34.6 sec  Urinalysis Basic - ( 23 Mar 2020 16:25 )    Color: Yellow / Appearance: Clear / S.010 / pH: x  Gluc: x / Ketone: Negative  / Bili: Negative / Urobili: Negative   Blood: x / Protein: 500 mg/dL / Nitrite: Negative   Leuk Esterase: Negative / RBC: 5-10 /HPF / WBC 0-2 /HPF   Sq Epi: x / Non Sq Epi: Neg.-Few / Bacteria: Few /HPF              CULTURES: (if applicable)        ABG - ( 24 Mar 2020 13:12 )  pH, Arterial: 7.24  pH, Blood: x     /  pCO2: 29    /  pO2: 120   / HCO3: x     / Base Excess: x     /  SaO2: 97                CAPILLARY BLOOD GLUCOSE      POCT Blood Glucose.: 187 mg/dL (24 Mar 2020 12:02)      RADIOLOGY  CXR:      CT:    ECHO:      VITALS:  T(C): 36.6 (20 @ 12:25), Max: 36.7 (20 @ 16:05)  T(F): 97.8 (20 @ 12:25), Max: 98 (20 @ 16:05)  HR: 77 (20 @ 12:25) (67 - 88)  BP: 144/66 (20 @ 12:25) (123/70 - 149/70)  BP(mean): --  ABP: --  ABP(mean): --  RR: 22 (20 @ 12:25) (19 - 24)  SpO2: 100% (20 @ 12:25) (96% - 100%)  CVP(mm Hg): --  CVP(cm H2O): --    Ins and Outs     20 @ 07:01  -  20 @ 15:28  --------------------------------------------------------  IN: 510 mL / OUT: 580 mL / NET: -70 mL        Height (cm): 172.72 (20 @ 15:56)  Weight (kg): 72.6 (20 @ 15:56)  BMI (kg/m2): 24.3 (20 @ 15:56)        I&O's Detail    24 Mar 2020 07:01  -  24 Mar 2020 15:28  --------------------------------------------------------  IN:    Oral Fluid: 460 mL    Solution: 50 mL  Total IN: 510 mL    OUT:    Indwelling Catheter - Urethral: 580 mL  Total OUT: 580 mL    Total NET: -70 mL          Physical Examination:  GENERAL:               Alert, Oriented, No acute distress.    HEENT:                    Pupils equal, reactive to light.  Symmetric. No JVD, Moist MM  PULM:                     Bilateral air entry, Clear to auscultation bilaterally, no significant sputum production, No Rales, No Rhonchi, No Wheezing  CVS:                         S1, S2,  No Murmur  ABD:                        Soft, nondistended, nontender, normoactive bowel sounds,   EXT:                         No edema, nontender, No Cyanosis or Clubbing   Vascular:                Warm Extremities, Normal Capillary refill, Normal Distal Pulses  SKIN:                       Warm and well perfused, no rashes noted.   NEURO:                  Alert, oriented, interactive, nonfocal, follows commands  PSYC:                      Calm, + Insight. PULMONARY & CRITICAL CARE  CONSULT  Location of Patient : [facility] ([facility]  Attending requesting Consult:Roderick Man  Chief Complaint : SOB  Reason For consult : SOB      Initial HPI on admission:  HPI:  86 year old with severe Dementia, DM2, HTN, CKD 4 (Cr 2.8 - 19), metastatic prostate ca, Depression, Ex smoker- COPD who p/w increasing sob, and confusion. in ed found to have PABLITO on ckd, hypocalcemia.  patient was admited to R/O COVID.   pt unable to provide history or ROS at this time, states he needs more o2, difficulty breathing cough.     as per HPI, no recent travel or sick contacts    PAST MEDICAL & SURGICAL HISTORY:  History of prostate cancer  Dementia  chronic renal disease  Dyslipidemia  H/O: obesity  COPD (chronic obstructive pulmonary disease)  Depression  Hyperlipemia  H/O: hypertension  Diabetes mellitus  Hx of coronary angiogram  Inguinal hernia    Allergies    Aricept (Other)  Cipro (Unknown)  Demerol HCl (Unknown)    Intolerances    Due to current medical status patient unable to provide medical, social, family history.  History obtained from available medical record.       Medications:  MEDICATIONS  (STANDING):  ALBUTerol    90 MICROgram(s) HFA Inhaler 2 Puff(s) Inhalation every 6 hours  amLODIPine   Tablet 5 milliGRAM(s) Oral daily  aspirin enteric coated 81 milliGRAM(s) Oral daily  atorvastatin 10 milliGRAM(s) Oral at bedtime  buPROPion XL . 150 milliGRAM(s) Oral daily  calcium carbonate 1250 mG  + Vitamin D (OsCal 500 + D) 1 Tablet(s) Oral daily  calcium gluconate IVPB 2 Gram(s) IV Intermittent once  dextrose 5%. 1000 milliLiter(s) (50 mL/Hr) IV Continuous <Continuous>  dextrose 50% Injectable 12.5 Gram(s) IV Push once  dextrose 50% Injectable 25 Gram(s) IV Push once  dextrose 50% Injectable 25 Gram(s) IV Push once  heparin  Injectable 5000 Unit(s) SubCutaneous every 8 hours  insulin lispro (HumaLOG) corrective regimen sliding scale   SubCutaneous three times a day before meals  insulin lispro (HumaLOG) corrective regimen sliding scale   SubCutaneous at bedtime  methylPREDNISolone sodium succinate Injectable 40 milliGRAM(s) IV Push daily  sertraline 100 milliGRAM(s) Oral daily  sodium bicarbonate 1300 milliGRAM(s) Oral two times a day    MEDICATIONS  (PRN):  acetaminophen   Tablet .. 650 milliGRAM(s) Oral every 4 hours PRN Mild Pain (1 - 3)  dextrose 40% Gel 15 Gram(s) Oral once PRN Blood Glucose LESS THAN 70 milliGRAM(s)/deciliter  glucagon  Injectable 1 milliGRAM(s) IntraMuscular once PRN Glucose LESS THAN 70 milligrams/deciliter      Home Medications:  Last Order Reconciliation Date: 20 @ 18:33 (Admission Reconciliation)  amLODIPine 5 mg oral tablet: 1 tab(s) orally once a day (19 @ 14:32)  aspirin 81 mg oral tablet, chewable: 1 tab(s) orally once a day (19 @ 14:32)  atorvastatin 10 mg oral tablet: 1 tab(s) orally once a day (19 @ 14:32)  buPROPion 100 mg oral tablet: 1 tab(s) orally 2 times a day (19 @ 14:33)  Bystolic 5 mg oral tablet: 1 tab(s) orally once a day (19 @ 14:36)  galantamine 4 mg oral tablet: 1 tab(s) orally 2 times a day (19 @ 14:33)  glimepiride 1 mg oral tablet: 1 tab(s) orally once a day (19 @ 14:38)  repaglinide 1 mg oral tablet: 1 tab(s) orally 3 times a day (before meals) (19 @ 14:34)  sertraline 100 mg oral tablet: 1 tab(s) orally once a day (19 @ 14:37)  sodium bicarbonate: 1300 milligram(s) orally 2 times a day (19 @ 14:36)      LABS:                        10.0   10.98 )-----------( 293      ( 24 Mar 2020 07:30 )             32.0     03-24    140  |  103  |  63<H>  ----------------------------<  174<H>  4.4   |  13<L>  |  3.12<H>    Ca    <5.0<LL>      24 Mar 2020 07:30  Phos  5.4     03-24  Mg     1.9     03-24    TPro  7.0  /  Alb  2.7<L>  /  TBili  0.4  /  DBili  x   /  AST  98<H>  /  ALT  34  /  AlkPhos  240<H>        CARDIAC MARKERS ( 23 Mar 2020 17:10 )  <.017 ng/mL / x     / x     / x     / x            PT/INR - ( 23 Mar 2020 16:25 )   PT: 13.5 sec;   INR: 1.20 ratio         PTT - ( 23 Mar 2020 16:25 )  PTT:34.6 sec  Urinalysis Basic - ( 23 Mar 2020 16:25 )    Color: Yellow / Appearance: Clear / S.010 / pH: x  Gluc: x / Ketone: Negative  / Bili: Negative / Urobili: Negative   Blood: x / Protein: 500 mg/dL / Nitrite: Negative   Leuk Esterase: Negative / RBC: 5-10 /HPF / WBC 0-2 /HPF   Sq Epi: x / Non Sq Epi: Neg.-Few / Bacteria: Few /HPF      WBC Trend  20 @ 07:30   -  10.98<H>  20 @ 16:25   -  12.00<H>    H/H Trend  20 @ 07:30   -  10.0<L> / 32.0<L>  20 @ 16:25   -  10.6<L> / 32.4<L>    Platelet Trend  20 @ 07:30   -  293  20 @ 16:25   -  264    Trend Sodium  20 @ 16:23   -  137  20 @ 07:30   -  140  20 @ 22:45   -  136  20 @ 16:25   -  135    Trend Potassium  20 @ 16:23   -  4.6  20 @ 07:30   -  4.4  20 @ 22:45   -  4.6  20 @ 16:25   -  6.0<H>    Trend Bun/Cr  20 @ 16:23  BUN/CR -  70<H> / 3.29<H>  20 @ 07:30  BUN/CR -  63<H> / 3.12<H>  20 @ 22:45  BUN/CR -  58<H> / 3.08<H>  20 @ 16:25  BUN/CR -  59<H> / 3.11<H>  19 @ 15:05  BUN/CR -  69<H> / 2.80<H>      Lactic Acid Trend  20 @ 16:30   -   1.4      Albumin Trend  20 @ 16:23   -   2.5<L>  20 @ 07:30   -   2.7<L>  20 @ 16:25   -   3.0<L>  19 @ 15:05   -   3.1<L>      Glucose Trend  20 @ 16:36   -  -- -- 267<H>  20 @ 16:23   -  -- 265<H> --  20 @ 12:02   -  -- -- 187<H>  20 @ 08:43   -  -- -- 166<H>  20 @ 07:30   -  -- 174<H> --  20 @ 22:45   -  -- 209<H> --  20 @ 22:12   -  -- -- 209<H>  20 @ 16:25   -  -- 175<H> --    Hemoglobin A1C, Whole Blood: 5.6 % (20 @ 07:30)    ABG Trend  20 @ 13:12   - 7.24<L>/29<L>/120<H>/97<H>  20 @ 16:35   - 7.29<L>/25<L>/311<H>/>99<H>      ABG - ( 24 Mar 2020 13:12 )  pH, Arterial: 7.24  pH, Blood: x     /  pCO2: 29    /  pO2: 120   / HCO3: x     / Base Excess: x     /  SaO2: 97                CAPILLARY BLOOD GLUCOSE      POCT Blood Glucose.: 187 mg/dL (24 Mar 2020 12:02)      RADIOLOGY  CXR:  < from: Xray Chest 1 View-PORTABLE IMMEDIATE (20 @ 14:27) >    INTERPRETATION:  CLINICAL STATEMENT: Follow-up chest pain. History of prostate cancer    TECHNIQUE: AP view of the chest.    COMPARISON: 3/23/2020    FINDINGS/  IMPRESSION:  Study limited due to suboptimal inspiration.    Probable small left pleural effusion. Left chest device again noted.    Heart size cannot be accurately assessed in this projection.    Nonspecific scattered sclerotic foci again noted. Metastasis not excluded.    < end of copied text >      CT:  < from: CT Head No Cont (19 @ 15:36) >  EXAM:  CT BRAIN      PROCEDURE DATE:  2019        INTERPRETATION:  PROCEDURE: CT head without contrast.    INDICATION: 85-year-old male with head injury.    TECHNIQUE: Multiple axial images were obtained through the brain, from   the skull base to the vertex. Imaging is performed using a standard   departmental protocol, with 2-D reformations into the sagittal and   coronal planes.    COMPARISON: CT head 2017    FINDINGS:   The CT examination demonstrates stable parenchymal volume loss with   symmetric prominence of the ventricles, cisternal spaces, and cortical   sulci.     There is no acute intracranial hemorrhage, mass effect, midline shift or   extra axial collections.     The gray white differentiation appears grossly preserved without evidence   for an acute transcortical infarction.     There is mild periventricular scattered subcortical white matter   hypodensities in both cerebral hemispheres, consistent with gliosis due   to ischemic small vessel disease.     There is no acute skull fracture or extracranial hematoma. Visualized   skull base is unremarkable. The included paranasal sinuses, middle ear   cavities and mastoid air cells are predominantly clear without air-fluid   levels. Postoperative changes of right cataract surgery.      IMPRESSION:   1. No mass effect, midline shift or acute intracranial hemorrhage.    2. Stable parenchymal volume loss and changes of chronic ischemic small   vessel disease.    3. No acute skull fracture.      < end of copied text >    ECHO:  < from: TTE Echo Complete w/o contrast w/ Doppler (20 @ 13:13) >       Summary:   1. Left ventricular ejection fraction, by visual estimation, is 55 to 60%.   2. Technically fair study.   3. Normal global left ventricular systolic function.   4. Spectral Doppler shows impaired relaxation pattern of left ventricular myocardial filling (Grade I diastolic dysfunction).   5. Mild thickening and calcification of the anterior and posterior mitral valve leaflets.   6.Sclerotic aortic valve with normal opening.   7. Estimated pulmonary artery systolic pressure is 37.5 mmHg assuming a right atrial pressure of 10 mmHg, which is consistent with borderline pulmonary hypertension.   8. LA volume Index is 33.6 ml/m² ml/m2.    < end of copied text >        VITALS:  T(C): 36.6 (20 @ 12:25), Max: 36.7 (20 @ 16:05)  T(F): 97.8 (20 @ 12:25), Max: 98 (20 @ 16:05)  HR: 77 (20 @ 12:25) (67 - 88)  BP: 144/66 (20 @ 12:25) (123/70 - 149/70)  BP(mean): --  ABP: --  ABP(mean): --  RR: 22 (20 @ 12:25) (19 - 24)  SpO2: 100% (20 @ 12:25) (96% - 100%)  CVP(mm Hg): --  CVP(cm H2O): --    Ins and Outs     20 @ 07:01  -  20 @ 15:28  --------------------------------------------------------  IN: 510 mL / OUT: 580 mL / NET: -70 mL        Height (cm): 172.72 (20 @ 15:56)  Weight (kg): 72.6 (20 @ 15:56)  BMI (kg/m2): 24.3 (20 @ 15:56)        I&O's Detail    24 Mar 2020 07:01  -  24 Mar 2020 15:28  --------------------------------------------------------  IN:    Oral Fluid: 460 mL    Solution: 50 mL  Total IN: 510 mL    OUT:    Indwelling Catheter - Urethral: 580 mL  Total OUT: 580 mL    Total NET: -70 mL          Physical Examination:  GENERAL:               Alert, Confused, mod distress.    HEENT:                   No JVD, Dry MM  PULM:                     Bilateral air entry, Clear to auscultation bilaterally, no significant sputum production, No Rales, No Rhonchi, No Wheezing  CVS:                         S1, S2,  +Murmur  ABD:                        Soft, nondistended, nontender, normoactive bowel sounds,   EXT:                         mild edema, nontender, No Cyanosis or Clubbing   Vascular:                Warm Extremities, Normal Capillary refill, Normal Distal Pulses  NEURO:                  Alert, confused,  nonfocal, follows commands  PSYC:                      agitated, anxious, no Insight.

## 2020-03-24 NOTE — PROCEDURE NOTE - ADDITIONAL PROCEDURE DETAILS
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

## 2020-03-25 LAB
ALBUMIN SERPL ELPH-MCNC: 2.3 G/DL — LOW (ref 3.3–5)
ALP SERPL-CCNC: 185 U/L — HIGH (ref 40–120)
ALT FLD-CCNC: 35 U/L — SIGNIFICANT CHANGE UP (ref 10–45)
ANION GAP SERPL CALC-SCNC: 14 MMOL/L — SIGNIFICANT CHANGE UP (ref 5–17)
ANION GAP SERPL CALC-SCNC: 20 MMOL/L — HIGH (ref 5–17)
AST SERPL-CCNC: 103 U/L — HIGH (ref 10–40)
BASOPHILS # BLD AUTO: 0.02 K/UL — SIGNIFICANT CHANGE UP (ref 0–0.2)
BASOPHILS NFR BLD AUTO: 0.2 % — SIGNIFICANT CHANGE UP (ref 0–2)
BILIRUB SERPL-MCNC: 0.4 MG/DL — SIGNIFICANT CHANGE UP (ref 0.2–1.2)
BUN SERPL-MCNC: 68 MG/DL — HIGH (ref 7–23)
BUN SERPL-MCNC: 71 MG/DL — HIGH (ref 7–23)
CA-I BLD-SCNC: 0.6 MMOL/L — CRITICAL LOW (ref 1.12–1.3)
CALCIUM SERPL-MCNC: 5.2 MG/DL — CRITICAL LOW (ref 8.4–10.5)
CALCIUM SERPL-MCNC: <5 MG/DL — CRITICAL LOW (ref 8.4–10.5)
CHLORIDE SERPL-SCNC: 104 MMOL/L — SIGNIFICANT CHANGE UP (ref 96–108)
CHLORIDE SERPL-SCNC: 104 MMOL/L — SIGNIFICANT CHANGE UP (ref 96–108)
CO2 SERPL-SCNC: 18 MMOL/L — LOW (ref 22–31)
CO2 SERPL-SCNC: 24 MMOL/L — SIGNIFICANT CHANGE UP (ref 22–31)
CREAT SERPL-MCNC: 3.25 MG/DL — HIGH (ref 0.5–1.3)
CREAT SERPL-MCNC: 3.32 MG/DL — HIGH (ref 0.5–1.3)
EOSINOPHIL # BLD AUTO: 0.06 K/UL — SIGNIFICANT CHANGE UP (ref 0–0.5)
EOSINOPHIL NFR BLD AUTO: 0.6 % — SIGNIFICANT CHANGE UP (ref 0–6)
GLUCOSE BLDC GLUCOMTR-MCNC: 168 MG/DL — HIGH (ref 70–99)
GLUCOSE BLDC GLUCOMTR-MCNC: 192 MG/DL — HIGH (ref 70–99)
GLUCOSE BLDC GLUCOMTR-MCNC: 205 MG/DL — HIGH (ref 70–99)
GLUCOSE BLDC GLUCOMTR-MCNC: 212 MG/DL — HIGH (ref 70–99)
GLUCOSE SERPL-MCNC: 164 MG/DL — HIGH (ref 70–99)
GLUCOSE SERPL-MCNC: 213 MG/DL — HIGH (ref 70–99)
HCT VFR BLD CALC: 26.4 % — LOW (ref 39–50)
HGB BLD-MCNC: 8.6 G/DL — LOW (ref 13–17)
IMM GRANULOCYTES NFR BLD AUTO: 0.8 % — SIGNIFICANT CHANGE UP (ref 0–1.5)
LDH SERPL L TO P-CCNC: 598 U/L — HIGH (ref 50–242)
LYMPHOCYTES # BLD AUTO: 1.02 K/UL — SIGNIFICANT CHANGE UP (ref 1–3.3)
LYMPHOCYTES # BLD AUTO: 9.6 % — LOW (ref 13–44)
MAGNESIUM SERPL-MCNC: 1.6 MG/DL — SIGNIFICANT CHANGE UP (ref 1.6–2.6)
MCHC RBC-ENTMCNC: 28.5 PG — SIGNIFICANT CHANGE UP (ref 27–34)
MCHC RBC-ENTMCNC: 32.6 GM/DL — SIGNIFICANT CHANGE UP (ref 32–36)
MCV RBC AUTO: 87.4 FL — SIGNIFICANT CHANGE UP (ref 80–100)
MONOCYTES # BLD AUTO: 0.93 K/UL — HIGH (ref 0–0.9)
MONOCYTES NFR BLD AUTO: 8.7 % — SIGNIFICANT CHANGE UP (ref 2–14)
NEUTROPHILS # BLD AUTO: 8.55 K/UL — HIGH (ref 1.8–7.4)
NEUTROPHILS NFR BLD AUTO: 80.1 % — HIGH (ref 43–77)
NRBC # BLD: 0 /100 WBCS — SIGNIFICANT CHANGE UP (ref 0–0)
PHOSPHATE SERPL-MCNC: 4.4 MG/DL — SIGNIFICANT CHANGE UP (ref 2.5–4.5)
PLATELET # BLD AUTO: 290 K/UL — SIGNIFICANT CHANGE UP (ref 150–400)
POTASSIUM SERPL-MCNC: 3.5 MMOL/L — SIGNIFICANT CHANGE UP (ref 3.5–5.3)
POTASSIUM SERPL-MCNC: 3.5 MMOL/L — SIGNIFICANT CHANGE UP (ref 3.5–5.3)
POTASSIUM SERPL-SCNC: 3.5 MMOL/L — SIGNIFICANT CHANGE UP (ref 3.5–5.3)
POTASSIUM SERPL-SCNC: 3.5 MMOL/L — SIGNIFICANT CHANGE UP (ref 3.5–5.3)
PROT SERPL-MCNC: 6.1 G/DL — SIGNIFICANT CHANGE UP (ref 6–8.3)
RBC # BLD: 3.02 M/UL — LOW (ref 4.2–5.8)
RBC # FLD: 16.6 % — HIGH (ref 10.3–14.5)
SODIUM SERPL-SCNC: 142 MMOL/L — SIGNIFICANT CHANGE UP (ref 135–145)
SODIUM SERPL-SCNC: 142 MMOL/L — SIGNIFICANT CHANGE UP (ref 135–145)
URATE SERPL-MCNC: 7.2 MG/DL — SIGNIFICANT CHANGE UP (ref 3.4–8.8)
URATE SERPL-MCNC: 7.3 MG/DL — SIGNIFICANT CHANGE UP (ref 3.4–8.8)
WBC # BLD: 10.66 K/UL — HIGH (ref 3.8–10.5)
WBC # FLD AUTO: 10.66 K/UL — HIGH (ref 3.8–10.5)

## 2020-03-25 PROCEDURE — 99233 SBSQ HOSP IP/OBS HIGH 50: CPT | Mod: GC

## 2020-03-25 PROCEDURE — 93010 ELECTROCARDIOGRAM REPORT: CPT

## 2020-03-25 RX ORDER — FUROSEMIDE 40 MG
20 TABLET ORAL ONCE
Refills: 0 | Status: COMPLETED | OUTPATIENT
Start: 2020-03-25 | End: 2020-03-25

## 2020-03-25 RX ORDER — BUDESONIDE AND FORMOTEROL FUMARATE DIHYDRATE 160; 4.5 UG/1; UG/1
2 AEROSOL RESPIRATORY (INHALATION)
Refills: 0 | Status: DISCONTINUED | OUTPATIENT
Start: 2020-03-25 | End: 2020-04-10

## 2020-03-25 RX ORDER — ALPRAZOLAM 0.25 MG
0.25 TABLET ORAL ONCE
Refills: 0 | Status: DISCONTINUED | OUTPATIENT
Start: 2020-03-25 | End: 2020-03-25

## 2020-03-25 RX ORDER — BUDESONIDE, MICRONIZED 100 %
0.5 POWDER (GRAM) MISCELLANEOUS ONCE
Refills: 0 | Status: COMPLETED | OUTPATIENT
Start: 2020-03-25 | End: 2020-03-25

## 2020-03-25 RX ORDER — IPRATROPIUM/ALBUTEROL SULFATE 18-103MCG
3 AEROSOL WITH ADAPTER (GRAM) INHALATION EVERY 6 HOURS
Refills: 0 | Status: DISCONTINUED | OUTPATIENT
Start: 2020-03-25 | End: 2020-04-09

## 2020-03-25 RX ORDER — CALCIUM GLUCONATE 100 MG/ML
2 VIAL (ML) INTRAVENOUS EVERY 4 HOURS
Refills: 0 | Status: COMPLETED | OUTPATIENT
Start: 2020-03-25 | End: 2020-03-25

## 2020-03-25 RX ORDER — SODIUM BICARBONATE 1 MEQ/ML
650 SYRINGE (ML) INTRAVENOUS
Refills: 0 | Status: DISCONTINUED | OUTPATIENT
Start: 2020-03-25 | End: 2020-04-01

## 2020-03-25 RX ORDER — CALCIUM GLUCONATE 100 MG/ML
2 VIAL (ML) INTRAVENOUS ONCE
Refills: 0 | Status: COMPLETED | OUTPATIENT
Start: 2020-03-25 | End: 2020-03-25

## 2020-03-25 RX ORDER — IPRATROPIUM/ALBUTEROL SULFATE 18-103MCG
3 AEROSOL WITH ADAPTER (GRAM) INHALATION ONCE
Refills: 0 | Status: COMPLETED | OUTPATIENT
Start: 2020-03-25 | End: 2020-03-25

## 2020-03-25 RX ADMIN — HEPARIN SODIUM 5000 UNIT(S): 5000 INJECTION INTRAVENOUS; SUBCUTANEOUS at 21:37

## 2020-03-25 RX ADMIN — Medication 0.5 MILLIGRAM(S): at 02:54

## 2020-03-25 RX ADMIN — Medication 0.25 MILLIGRAM(S): at 09:49

## 2020-03-25 RX ADMIN — HEPARIN SODIUM 5000 UNIT(S): 5000 INJECTION INTRAVENOUS; SUBCUTANEOUS at 13:38

## 2020-03-25 RX ADMIN — Medication 200 GRAM(S): at 14:07

## 2020-03-25 RX ADMIN — BUPROPION HYDROCHLORIDE 150 MILLIGRAM(S): 150 TABLET, EXTENDED RELEASE ORAL at 13:38

## 2020-03-25 RX ADMIN — ALBUTEROL 2 PUFF(S): 90 AEROSOL, METERED ORAL at 00:05

## 2020-03-25 RX ADMIN — Medication 1: at 12:00

## 2020-03-25 RX ADMIN — Medication 650 MILLIGRAM(S): at 21:41

## 2020-03-25 RX ADMIN — Medication 2: at 08:59

## 2020-03-25 RX ADMIN — Medication 81 MILLIGRAM(S): at 13:38

## 2020-03-25 RX ADMIN — Medication 0.25 MILLIGRAM(S): at 03:23

## 2020-03-25 RX ADMIN — Medication 200 GRAM(S): at 18:01

## 2020-03-25 RX ADMIN — ATORVASTATIN CALCIUM 10 MILLIGRAM(S): 80 TABLET, FILM COATED ORAL at 21:37

## 2020-03-25 RX ADMIN — BUDESONIDE AND FORMOTEROL FUMARATE DIHYDRATE 2 PUFF(S): 160; 4.5 AEROSOL RESPIRATORY (INHALATION) at 21:51

## 2020-03-25 RX ADMIN — Medication 20 MILLIGRAM(S): at 03:14

## 2020-03-25 RX ADMIN — HEPARIN SODIUM 5000 UNIT(S): 5000 INJECTION INTRAVENOUS; SUBCUTANEOUS at 09:49

## 2020-03-25 RX ADMIN — Medication 3 MILLILITER(S): at 02:54

## 2020-03-25 RX ADMIN — Medication 1334 MILLIGRAM(S): at 13:39

## 2020-03-25 RX ADMIN — SERTRALINE 100 MILLIGRAM(S): 25 TABLET, FILM COATED ORAL at 13:38

## 2020-03-25 RX ADMIN — Medication 0.25 MILLIGRAM(S): at 13:38

## 2020-03-25 RX ADMIN — Medication 2: at 17:04

## 2020-03-25 RX ADMIN — Medication 3 MILLILITER(S): at 21:51

## 2020-03-25 RX ADMIN — Medication 1334 MILLIGRAM(S): at 17:05

## 2020-03-25 RX ADMIN — AMLODIPINE BESYLATE 5 MILLIGRAM(S): 2.5 TABLET ORAL at 09:50

## 2020-03-25 RX ADMIN — Medication 0: at 21:37

## 2020-03-25 RX ADMIN — Medication 650 MILLIGRAM(S): at 23:48

## 2020-03-25 RX ADMIN — Medication 3 MILLILITER(S): at 10:46

## 2020-03-25 RX ADMIN — CALCITRIOL 0.5 MICROGRAM(S): 0.5 CAPSULE ORAL at 13:39

## 2020-03-25 RX ADMIN — Medication 200 GRAM(S): at 21:36

## 2020-03-25 RX ADMIN — Medication 0.25 MILLIGRAM(S): at 21:36

## 2020-03-25 RX ADMIN — Medication 75 MEQ/KG/HR: at 13:37

## 2020-03-25 RX ADMIN — Medication 650 MILLIGRAM(S): at 23:46

## 2020-03-25 NOTE — PROGRESS NOTE ADULT - ASSESSMENT
87 yo M with PMHx of dementia, DM, HTN, depression, CKD4, COPD presents to the ED with difficulty breathing and progressive confusion.    #SOB likely multifactorial, Acute COPD exacerbation vs metabolic acidosis. r/o gram negative PNA  - COVID-19 negative  - albuterol q6  - ativan 0.25mg q8 prn  - Levaquin 500mg   - follow up with pulmonary  - follow up Speech_swallow eval  - CXR: patchy infiltrates in RLL  - ID consult pending     #Metabolic encephalopathy: 2/2 multiple electrolytes abnormalities likely 2/2 Xgeva (denosumab) and PABLITO on CKD.   - correct electrolytes as below  - trend BMP  - aspiration precautions    #Hypocalcemia: corrected Ca: 6, likely 2/2 Denosumab vs secondary hyperparathyroidism   - prolonged QTc on ECG (566) on admission - repeat QTC was 560  - . vitamin D 25: low. vit D 1,25: normal   - continue rocaltrol, phoslo   - cont tele box  - renal is following    #Hypomagnesemia and hyperkalemia   - resolved    #PABLITO on CKD stage 4 and high anionic gap metabolic acidosis:  - Renal consulted  - hold nephrotoxic meds  - continue sodium bicarb infusion  - trend BMP    # Urinary retention  - start flomax   - hernández catheter started yesterday    #HTN:  - continue amlodipine  - BBlocker held on admission due to hyperkalemia    #Depression:  - continue Sertraline and buproprion (home dose buproprion 100mg BID, pharmacy here only has 150mg XL)    #DVT ppx:  - HSQ    #Goals of care:  - DNR/DNI 87 yo M with PMHx of dementia, DM, HTN, depression, CKD4, COPD presents to the ED with difficulty breathing and progressive confusion.    #SOB likely multifactorial, Acute COPD exacerbation vs metabolic acidosis vs. pneumonia   - Afebrile, WBCs normal, blood cultures negative, COVID-19 negative  - CXR: patchy infiltrates in RLL  - Continue albuterol q6  - ativan 0.25mg q8 prn  - Monitor off abx for now, will check procalcitonin   - ID consult pending   - Pulm following    #Metabolic encephalopathy: 2/2 multiple electrolytes abnormalities likely 2/2 Xgeva (denosumab) and PABLITO on CKD.   - correct electrolytes as below  - trend BMP  - aspiration precautions    #Hypocalcemia: corrected Ca: 6, likely 2/2 Denosumab vs secondary hyperparathyroidism   - prolonged QTc on ECG (566) on admission - repeat QTC was 560  - . vitamin D 25: low. vit D 1,25: normal   - continue rocaltrol, phoslo   - Calcium gluconate IV as per renal  - renal is following    #PABLITO on CKD stage 4 and high anionic gap metabolic acidosis:  - Renal consulted  - hold nephrotoxic meds  - continue sodium bicarb infusion  - trend BMP    #Hypomagnesemia and hyperkalemia   - resolved    # Urinary retention  - start flomax   - hernández catheter placed yesterday    #HTN:  - continue amlodipine  - BBlocker held on admission due to hyperkalemia    #Depression:  - continue Sertraline and buproprion (home dose buproprion 100mg BID, pharmacy here only has 150mg XL)    #DVT ppx:  - HSQ    #Goals of care:  - DNR/DNI 85 yo M with PMHx of dementia, DM, HTN, depression, CKD4, COPD presents to the ED with difficulty breathing and progressive confusion.    #SOB likely multifactorial, Acute COPD exacerbation vs metabolic acidosis vs. pneumonia   - Afebrile, WBCs normal, blood cultures negative, COVID-19 negative  - CXR: patchy infiltrates in RLL  - Continue albuterol q6  - ativan 0.25mg q8 prn  - Monitor off abx for now, will check procalcitonin   - ID consult pending   - Pulm following    #Metabolic encephalopathy: 2/2 multiple electrolytes abnormalities likely 2/2 Xgeva (denosumab) and PABLITO on CKD.   - correct electrolytes as below  - trend BMP  - aspiration precautions    #Hypocalcemia: corrected Ca: 6, likely 2/2 Denosumab vs secondary hyperparathyroidism   - prolonged QTc on ECG (566) on admission - repeat QTC was 560  - . vitamin D 25: low. vit D 1,25: normal   - continue rocaltrol, phoslo   - Calcium gluconate IV as per renal  - renal is following    #PABLITO (Cr 2.8 in May 2019)  on CKD stage 4 and high anionic gap metabolic acidosis:  - Renal consulted  - hold nephrotoxic meds  - continue sodium bicarb infusion  - trend BMP    #Hypomagnesemia and hyperkalemia   - resolved    # Urinary retention  - start flomax   - hernández catheter placed yesterday    #HTN:  - continue amlodipine  - BBlocker held on admission due to hyperkalemia    #Depression:  - continue Sertraline and buproprion (home dose buproprion 100mg BID, pharmacy here only has 150mg XL)    #DVT ppx:  - HSQ    #Goals of care:  - DNR/DNI

## 2020-03-25 NOTE — PROGRESS NOTE ADULT - SUBJECTIVE AND OBJECTIVE BOX
Follow-up Critical Care Progress Note  Chief Complaint : Hypocalcemia      pt seen and examined  confused  less resp distress as compared to yesterday s    Allergies :Aricept (Other)  Cipro (Unknown)  Demerol HCl (Unknown)      PAST MEDICAL & SURGICAL HISTORY:  History of prostate cancer  Dementia  chronic renal disease  Dyslipidemia  H/O: obesity  COPD (chronic obstructive pulmonary disease)  Depression  Hyperlipemia  H/O: hypertension  Diabetes mellitus  Hx of coronary angiogram  Inguinal hernia      Medications:  MEDICATIONS  (STANDING):  albuterol/ipratropium for Nebulization 3 milliLiter(s) Nebulizer every 6 hours  aspirin enteric coated 81 milliGRAM(s) Oral daily  atorvastatin 10 milliGRAM(s) Oral at bedtime  buPROPion XL . 150 milliGRAM(s) Oral daily  calcitriol   Capsule 0.5 MICROGram(s) Oral daily  calcium acetate 1334 milliGRAM(s) Oral three times a day with meals  calcium gluconate IVPB 2 Gram(s) IV Intermittent every 4 hours  dextrose 5%. 1000 milliLiter(s) (50 mL/Hr) IV Continuous <Continuous>  dextrose 50% Injectable 12.5 Gram(s) IV Push once  dextrose 50% Injectable 25 Gram(s) IV Push once  dextrose 50% Injectable 25 Gram(s) IV Push once  heparin  Injectable 5000 Unit(s) SubCutaneous every 8 hours  insulin lispro (HumaLOG) corrective regimen sliding scale   SubCutaneous three times a day before meals  insulin lispro (HumaLOG) corrective regimen sliding scale   SubCutaneous at bedtime  LORazepam     Tablet 0.25 milliGRAM(s) Oral every 8 hours  sertraline 100 milliGRAM(s) Oral daily  sodium bicarbonate  Infusion 0.155 mEq/kG/Hr (75 mL/Hr) IV Continuous <Continuous>    MEDICATIONS  (PRN):  acetaminophen   Tablet .. 650 milliGRAM(s) Oral every 4 hours PRN Mild Pain (1 - 3)  dextrose 40% Gel 15 Gram(s) Oral once PRN Blood Glucose LESS THAN 70 milliGRAM(s)/deciliter  glucagon  Injectable 1 milliGRAM(s) IntraMuscular once PRN Glucose LESS THAN 70 milligrams/deciliter      LABS:                        8.6    10.66 )-----------( 290      ( 25 Mar 2020 07:07 )             26.4     03-25    142  |  104  |  68<H>  ----------------------------<  213<H>  3.5   |  18<L>  |  3.25<H>    Ca    <5.0<LL>      25 Mar 2020 07:07  Phos  4.4     03-25  Mg     1.6     25    TPro  6.1  /  Alb  2.3<L>  /  TBili  0.4  /  DBili  x   /  AST  103<H>  /  ALT  35  /  AlkPhos  185<H>  25      CARDIAC MARKERS ( 23 Mar 2020 17:10 )  <.017 ng/mL / x     / x     / x     / x            PT/INR - ( 23 Mar 2020 16:25 )   PT: 13.5 sec;   INR: 1.20 ratio         PTT - ( 23 Mar 2020 16:25 )  PTT:34.6 sec  Urinalysis Basic - ( 23 Mar 2020 16:25 )    Color: Yellow / Appearance: Clear / S.010 / pH: x  Gluc: x / Ketone: Negative  / Bili: Negative / Urobili: Negative   Blood: x / Protein: 500 mg/dL / Nitrite: Negative   Leuk Esterase: Negative / RBC: 5-10 /HPF / WBC 0-2 /HPF   Sq Epi: x / Non Sq Epi: Neg.-Few / Bacteria: Few /HPF    Home Medications:  amLODIPine 5 mg oral tablet: 1 tab(s) orally once a day (18 May 2019 14:32)  aspirin 81 mg oral tablet, chewable: 1 tab(s) orally once a day (18 May 2019 14:32)  atorvastatin 10 mg oral tablet: 1 tab(s) orally once a day (18 May 2019 14:32)  buPROPion 100 mg oral tablet: 1 tab(s) orally 2 times a day (18 May 2019 14:33)  Bystolic 5 mg oral tablet: 1 tab(s) orally once a day (18 May 2019 14:36)  galantamine 4 mg oral tablet: 1 tab(s) orally 2 times a day (18 May 2019 14:33)  glimepiride 1 mg oral tablet: 1 tab(s) orally once a day (18 May 2019 14:38)  repaglinide 1 mg oral tablet: 1 tab(s) orally 3 times a day (before meals) (18 May 2019 14:34)  sertraline 100 mg oral tablet: 1 tab(s) orally once a day (18 May 2019 14:37)  sodium bicarbonate: 1300 milligram(s) orally 2 times a day (18 May 2019 14:36)            CULTURES: (if applicable)    Culture - Urine (collected 20 @ 16:25)  Source: .Urine Clean Catch (Midstream)  Final Report (20 @ 17:03):    No growth    Culture - Blood (collected 20 @ 16:25)  Source: .Blood Blood-Peripheral  Preliminary Report (20 @ 22:02):    No growth to date.    Culture - Blood (collected 20 @ 16:25)  Source: .Blood Blood-Peripheral  Preliminary Report (20 @ 22:02):    No growth to date.          ABG - ( 24 Mar 2020 13:12 )  pH, Arterial: 7.24  pH, Blood: x     /  pCO2: 29    /  pO2: 120   / HCO3: x     / Base Excess: x     /  SaO2: 97                CAPILLARY BLOOD GLUCOSE      POCT Blood Glucose.: 168 mg/dL (25 Mar 2020 11:51)    VITALS:  T(C): 36.8 (20 @ 10:36), Max: 36.8 (20 @ 05:55)  T(F): 98.3 (20 @ 10:36), Max: 98.3 (20 @ 05:55)  HR: 90 (20 @ 10:47) (55 - 92)  BP: 95/42 (20 @ 10:36) (95/42 - 149/76)  BP(mean): --  ABP: --  ABP(mean): --  RR: 18 (20 @ 10:36) (18 - 28)  SpO2: 97% (20 @ 10:47) (94% - 100%)  CVP(mm Hg): --  CVP(cm H2O): --    Ins and Outs     20 @ 07:01  -  20 @ 07:00  --------------------------------------------------------  IN: 610 mL / OUT: 1180 mL / NET: -570 mL    20 @ 07:01  -  20 @ 14:17  --------------------------------------------------------  IN: 540 mL / OUT: 0 mL / NET: 540 mL        Height (cm): 172.72 (20 @ 15:56)  Weight (kg): 72.6 (20 @ 15:56)  BMI (kg/m2): 24.3 (20 @ 15:56)        I&O's Detail    24 Mar 2020 07:  -  25 Mar 2020 07:00  --------------------------------------------------------  IN:    Oral Fluid: 460 mL    Solution: 150 mL  Total IN: 610 mL    OUT:    Indwelling Catheter - Urethral: 1180 mL  Total OUT: 1180 mL    Total NET: -570 mL      25 Mar 2020 07:  -  25 Mar 2020 14:17  --------------------------------------------------------  IN:    Oral Fluid: 240 mL    sodium bicarbonate  Infusion: 300 mL  Total IN: 540 mL    OUT:  Total OUT: 0 mL    Total NET: 540 mL

## 2020-03-25 NOTE — PROGRESS NOTE ADULT - SUBJECTIVE AND OBJECTIVE BOX
No distress    Vital Signs Last 24 Hrs  T(C): 37.1 (03-25-20 @ 16:00), Max: 37.1 (03-25-20 @ 16:00)  T(F): 98.7 (03-25-20 @ 16:00), Max: 98.7 (03-25-20 @ 16:00)  HR: 82 (03-25-20 @ 16:00) (55 - 95)  BP: 113/58 (03-25-20 @ 16:00) (95/42 - 149/76)  RR: 18 (03-25-20 @ 16:00) (18 - 28)  SpO2: 98% (03-25-20 @ 16:00) (94% - 100%)    I&O's Summary    24 Mar 2020 07:01  -  25 Mar 2020 07:00  --------------------------------------------------------  IN: 610 mL / OUT: 1180 mL / NET: -570 mL    25 Mar 2020 07:01  -  25 Mar 2020 17:40  --------------------------------------------------------  IN: 915 mL / OUT: 400 mL / NET: 515 mL    Card S1S2  Lungs b/l air entry  Abd soft, NT, ND  Extr no edema                        8.6    10.66 )-----------( 290      ( 25 Mar 2020 07:07 )             26.4     25 Mar 2020 07:07    142    |  104    |  68     ----------------------------<  213    3.5     |  18     |  3.25     Ca    <5.0       25 Mar 2020 07:07  Phos  4.4       25 Mar 2020 07:07  Mg     1.6       25 Mar 2020 07:07    TPro  6.1    /  Alb  2.3    /  TBili  0.4    /  DBili  x      /  AST  103    /  ALT  35     /  AlkPhos  185    25 Mar 2020 07:07    LIVER FUNCTIONS - ( 25 Mar 2020 07:07 )  Alb: 2.3 g/dL / Pro: 6.1 g/dL / ALK PHOS: 185 U/L / ALT: 35 U/L / AST: 103 U/L / GGT: x           acetaminophen   Tablet .. 650 milliGRAM(s) Oral every 4 hours PRN  albuterol/ipratropium for Nebulization 3 milliLiter(s) Nebulizer every 6 hours  aspirin enteric coated 81 milliGRAM(s) Oral daily  atorvastatin 10 milliGRAM(s) Oral at bedtime  budesonide 160 MICROgram(s)/formoterol 4.5 MICROgram(s) Inhaler 2 Puff(s) Inhalation two times a day  buPROPion XL . 150 milliGRAM(s) Oral daily  calcitriol   Capsule 0.5 MICROGram(s) Oral daily  calcium acetate 1334 milliGRAM(s) Oral three times a day with meals  calcium gluconate IVPB 2 Gram(s) IV Intermittent every 4 hours  dextrose 40% Gel 15 Gram(s) Oral once PRN  dextrose 5%. 1000 milliLiter(s) IV Continuous <Continuous>  dextrose 50% Injectable 12.5 Gram(s) IV Push once  dextrose 50% Injectable 25 Gram(s) IV Push once  dextrose 50% Injectable 25 Gram(s) IV Push once  glucagon  Injectable 1 milliGRAM(s) IntraMuscular once PRN  heparin  Injectable 5000 Unit(s) SubCutaneous every 8 hours  insulin lispro (HumaLOG) corrective regimen sliding scale   SubCutaneous three times a day before meals  insulin lispro (HumaLOG) corrective regimen sliding scale   SubCutaneous at bedtime  LORazepam     Tablet 0.25 milliGRAM(s) Oral every 8 hours  sertraline 100 milliGRAM(s) Oral daily  sodium bicarbonate  Infusion 0.155 mEq/kG/Hr IV Continuous <Continuous>    A/P:    Hx COPD, CKD 4 (Cr 2.8 - 5/18/19), metastatic prostate ca  COPD exacerbation, possibly lung mets (COVID negative)  Severe hypocalcemia w/QT prolongation due to Denosumab (got on 3/12) exacerbated by bone disorder of advanced CKD w/low PTH and hyperphosphatemia  Mg is normal  Phoslo 2tabs PO TID  Calcitriol PO  IV Ca Gluconate  F/u Ca and iCa level BID, f/u CMP  Would avoid Denosumab in the future given CKD 4  Will follow

## 2020-03-25 NOTE — PROGRESS NOTE ADULT - SUBJECTIVE AND OBJECTIVE BOX
Patient is a 86y old  Male who presents with a chief complaint of Confusion, dyspnea (25 Mar 2020 08:56)    Patient seen and examined at bedside. Patient is awake, alert, oriented x2. C/o shortness of breath. Able to speak in full sentences though still noted to have increased work of breathing, wheeze    ALLERGIES:  Aricept (Other)  Cipro (Unknown)  Demerol HCl (Unknown)    MEDICATIONS  (STANDING):  albuterol/ipratropium for Nebulization 3 milliLiter(s) Nebulizer every 6 hours  aspirin enteric coated 81 milliGRAM(s) Oral daily  atorvastatin 10 milliGRAM(s) Oral at bedtime  buPROPion XL . 150 milliGRAM(s) Oral daily  calcitriol   Capsule 0.5 MICROGram(s) Oral daily  calcium acetate 1334 milliGRAM(s) Oral three times a day with meals  calcium gluconate IVPB 2 Gram(s) IV Intermittent every 4 hours  dextrose 5%. 1000 milliLiter(s) (50 mL/Hr) IV Continuous <Continuous>  dextrose 50% Injectable 12.5 Gram(s) IV Push once  dextrose 50% Injectable 25 Gram(s) IV Push once  dextrose 50% Injectable 25 Gram(s) IV Push once  heparin  Injectable 5000 Unit(s) SubCutaneous every 8 hours  insulin lispro (HumaLOG) corrective regimen sliding scale   SubCutaneous three times a day before meals  insulin lispro (HumaLOG) corrective regimen sliding scale   SubCutaneous at bedtime  LORazepam     Tablet 0.25 milliGRAM(s) Oral every 8 hours  sertraline 100 milliGRAM(s) Oral daily  sodium bicarbonate  Infusion 0.155 mEq/kG/Hr (75 mL/Hr) IV Continuous <Continuous>    MEDICATIONS  (PRN):  acetaminophen   Tablet .. 650 milliGRAM(s) Oral every 4 hours PRN Mild Pain (1 - 3)  dextrose 40% Gel 15 Gram(s) Oral once PRN Blood Glucose LESS THAN 70 milliGRAM(s)/deciliter  glucagon  Injectable 1 milliGRAM(s) IntraMuscular once PRN Glucose LESS THAN 70 milligrams/deciliter    Vital Signs Last 24 Hrs  T(F): 98.3 (25 Mar 2020 10:36), Max: 98.3 (25 Mar 2020 05:55)  HR: 90 (25 Mar 2020 10:47) (55 - 92)  BP: 95/42 (25 Mar 2020 10:36) (95/42 - 149/76)  RR: 18 (25 Mar 2020 10:36) (18 - 28)  SpO2: 97% (25 Mar 2020 10:47) (94% - 100%)    I&O's Summary  24 Mar 2020 07:01  -  25 Mar 2020 07:00  --------------------------------------------------------  IN: 610 mL / OUT: 1180 mL / NET: -570 mL    25 Mar 2020 07:01  -  25 Mar 2020 13:00  --------------------------------------------------------  IN: 540 mL / OUT: 0 mL / NET: 540 mL    PHYSICAL EXAM:  GENERAL: Anxious, elderly  HEAD:  Atraumatic, Normocephalic  EYES: Pupils assymetrical - left pupil misshapen, dilated   ENMT: Dry mucous membranes  NECK: Supple, full ROM  CHEST/LUNG: Tachypneic, wheeze to auscultation bilaterally  HEART: RRR; S1/S2, No murmur  ABDOMEN: Soft, Nontender, Nondistended; Bowel sounds present  VASCULAR: Normal pulses, Normal capillary refill, no edema  EXTREMITIES: No calf tenderness, No cyanosis  SKIN: Warm, Intact  NERVOUS SYSTEM:  A/O x2, No focal deficits    LABS:                        8.6    10.66 )-----------( 290      ( 25 Mar 2020 07:07 )             26.4         142  |  104  |  68  ----------------------------<  213  3.5   |  18  |  3.25    Ca    <5.0      25 Mar 2020 07:07  Phos  4.4       Mg     1.6         TPro  6.1  /  Alb  2.3  /  TBili  0.4  /  DBili  x   /  AST  103  /  ALT  35  /  AlkPhos  185  25    eGFR if Non African American: 16 mL/min/1.73M2 (- @ 07:07)  eGFR if : 19 mL/min/1.73M2 (20 @ 07:07)    PT/INR - ( 23 Mar 2020 16:25 )   PT: 13.5 sec;   INR: 1.20 ratio         PTT - ( 23 Mar 2020 16:25 )  PTT:34.6 sec  Lactate, Blood: 1.4 mmol/L ( @ 16:30)    CARDIAC MARKERS ( 23 Mar 2020 17:10 )  <.017 ng/mL / x     / x     / x     / x        TSH 1.01   TSH with FT4 reflex --  Total T3 --      ABG - ( 24 Mar 2020 13:12 )  pH, Arterial: 7.24  pH, Blood: x     /  pCO2: 29    /  pO2: 120   / HCO3: x     / Base Excess: x     /  SaO2: 97        POCT Blood Glucose.: 168 mg/dL (25 Mar 2020 11:51)  POCT Blood Glucose.: 212 mg/dL (25 Mar 2020 08:38)  POCT Blood Glucose.: 192 mg/dL (24 Mar 2020 23:49)  POCT Blood Glucose.: 267 mg/dL (24 Mar 2020 16:36)    03- [id number] 5.6    Urinalysis Basic - ( 23 Mar 2020 16:25 )    Color: Yellow / Appearance: Clear / S.010 / pH: x  Gluc: x / Ketone: Negative  / Bili: Negative / Urobili: Negative   Blood: x / Protein: 500 mg/dL / Nitrite: Negative   Leuk Esterase: Negative / RBC: 5-10 /HPF / WBC 0-2 /HPF   Sq Epi: x / Non Sq Epi: Neg.-Few / Bacteria: Few /HPF      Culture - Urine (collected 23 Mar 2020 16:25)  Source: .Urine Clean Catch (Midstream)  Final Report (24 Mar 2020 17:03):    No growth    Culture - Blood (collected 23 Mar 2020 16:25)  Source: .Blood Blood-Peripheral  Preliminary Report (24 Mar 2020 22:02):    No growth to date.    Culture - Blood (collected 23 Mar 2020 16:25)  Source: .Blood Blood-Peripheral  Preliminary Report (24 Mar 2020 22:02):    No growth to date.      RADIOLOGY & ADDITIONAL TESTS:      < from: CT Head No Cont (19 @ 15:36) >    IMPRESSION:   1. No mass effect, midline shift or acute intracranial hemorrhage.    2. Stable parenchymal volume loss and changes of chronic ischemic small   vessel disease.    3. No acute skull fracture.        < end of copied text >    < from: Xray Chest 1 View-PORTABLE IMMEDIATE (20 @ 14:27) >    FINDINGS/  IMPRESSION:  Study limited due to suboptimal inspiration.    Probable small left pleural effusion. Left chest device again noted.    Heart size cannot be accurately assessed in this projection.    Nonspecific scattered sclerotic foci again noted. Metastasis not excluded.      < end of copied text >      Care Discussed with Consultants/Other Providers: yes

## 2020-03-25 NOTE — PROGRESS NOTE ADULT - ATTENDING COMMENTS
I have personally seen and examined patient on the above date.  I discussed the case with ALBINA Coon and I agree with findings and plan as detailed per note above, which I have amended where appropriate.      Shortness of breath most likely compensatory respiratory effect from metabolic acidosis with hypocalcemia. Less likely pneumonia. Check PCT. No fevers. c/w calcium gluconate IV. Daily ionized calcium. Renal following.    Consider void trial tonight. I have personally seen and examined patient on the above date.  I discussed the case with ALBINA Coon and I agree with findings and plan as detailed per note above, which I have amended where appropriate.      Shortness of breath most likely compensatory respiratory effect from metabolic acidosis with hypocalcemia. Less likely pneumonia. Check PCT. No fevers. c/w calcium gluconate IV. Daily ionized calcium. Renal following.    Consider void trial tonight.    Advanced prostate cancer: outpatient oncology follow-up

## 2020-03-25 NOTE — PROGRESS NOTE ADULT - ASSESSMENT
Physical Examination:  GENERAL:               Alert, Confused, mod distress.    HEENT:                   No JVD, Dry MM  PULM:                     Bilateral air entry, Clear to auscultation bilaterally, no significant sputum production, No Rales, No Rhonchi, No Wheezing  CVS:                         S1, S2,  +Murmur  ABD:                        Soft, nondistended, nontender, normoactive bowel sounds,   NEURO:                  Alert, confused,  nonfocal, follows commands  PSYC:                      agitated, anxious, no Insight.    Assessment  1. Dyspnea suspect from metabolic Acidosis      with respiratory distress, pending respiratory failure   2. PABLITO on CKD 4 with metabolic acidosis, and hypocalcemia  3. Abnormal CXR suspect mets to bone  4. negative COVID  5. Underlying COPD (doubt exacerbation), Dementia, High Chol, DM2, HTN        Plan  bicarb drip,   Supplement calcium  Renal f/u   Dilaudid PRN to be considered  can d/c steroids as doubt acute exac.   Bronchodilators and symbicort to see if sob improve.   CT Chest to be considered to evaluate met status.   d/w dr. Kaur.

## 2020-03-26 LAB
ALBUMIN SERPL ELPH-MCNC: 2.1 G/DL — LOW (ref 3.3–5)
ALP SERPL-CCNC: 165 U/L — HIGH (ref 40–120)
ALT FLD-CCNC: 32 U/L — SIGNIFICANT CHANGE UP (ref 10–45)
ANION GAP SERPL CALC-SCNC: 13 MMOL/L — SIGNIFICANT CHANGE UP (ref 5–17)
ANION GAP SERPL CALC-SCNC: 18 MMOL/L — HIGH (ref 5–17)
AST SERPL-CCNC: 72 U/L — HIGH (ref 10–40)
BILIRUB SERPL-MCNC: 0.5 MG/DL — SIGNIFICANT CHANGE UP (ref 0.2–1.2)
BUN SERPL-MCNC: 67 MG/DL — HIGH (ref 7–23)
BUN SERPL-MCNC: 69 MG/DL — HIGH (ref 7–23)
CA-I BLD-SCNC: 0.7 MMOL/L — CRITICAL LOW (ref 1.12–1.3)
CALCIUM SERPL-MCNC: 5.3 MG/DL — CRITICAL LOW (ref 8.4–10.5)
CALCIUM SERPL-MCNC: 5.7 MG/DL — CRITICAL LOW (ref 8.4–10.5)
CHLORIDE SERPL-SCNC: 103 MMOL/L — SIGNIFICANT CHANGE UP (ref 96–108)
CHLORIDE SERPL-SCNC: 103 MMOL/L — SIGNIFICANT CHANGE UP (ref 96–108)
CO2 SERPL-SCNC: 20 MMOL/L — LOW (ref 22–31)
CO2 SERPL-SCNC: 23 MMOL/L — SIGNIFICANT CHANGE UP (ref 22–31)
CREAT SERPL-MCNC: 3.19 MG/DL — HIGH (ref 0.5–1.3)
CREAT SERPL-MCNC: 3.29 MG/DL — HIGH (ref 0.5–1.3)
GLUCOSE BLDC GLUCOMTR-MCNC: 186 MG/DL — HIGH (ref 70–99)
GLUCOSE BLDC GLUCOMTR-MCNC: 198 MG/DL — HIGH (ref 70–99)
GLUCOSE BLDC GLUCOMTR-MCNC: 207 MG/DL — HIGH (ref 70–99)
GLUCOSE BLDC GLUCOMTR-MCNC: 218 MG/DL — HIGH (ref 70–99)
GLUCOSE SERPL-MCNC: 167 MG/DL — HIGH (ref 70–99)
GLUCOSE SERPL-MCNC: 191 MG/DL — HIGH (ref 70–99)
HCT VFR BLD CALC: 25.4 % — LOW (ref 39–50)
HGB BLD-MCNC: 8 G/DL — LOW (ref 13–17)
MAGNESIUM SERPL-MCNC: 1.1 MG/DL — LOW (ref 1.6–2.6)
MAGNESIUM SERPL-MCNC: 1.8 MG/DL — SIGNIFICANT CHANGE UP (ref 1.6–2.6)
MCHC RBC-ENTMCNC: 27.9 PG — SIGNIFICANT CHANGE UP (ref 27–34)
MCHC RBC-ENTMCNC: 31.5 GM/DL — LOW (ref 32–36)
MCV RBC AUTO: 88.5 FL — SIGNIFICANT CHANGE UP (ref 80–100)
NRBC # BLD: 0 /100 WBCS — SIGNIFICANT CHANGE UP (ref 0–0)
PHOSPHATE SERPL-MCNC: 3.6 MG/DL — SIGNIFICANT CHANGE UP (ref 2.5–4.5)
PLATELET # BLD AUTO: 238 K/UL — SIGNIFICANT CHANGE UP (ref 150–400)
POTASSIUM SERPL-MCNC: 3.9 MMOL/L — SIGNIFICANT CHANGE UP (ref 3.5–5.3)
POTASSIUM SERPL-MCNC: 3.9 MMOL/L — SIGNIFICANT CHANGE UP (ref 3.5–5.3)
POTASSIUM SERPL-SCNC: 3.9 MMOL/L — SIGNIFICANT CHANGE UP (ref 3.5–5.3)
POTASSIUM SERPL-SCNC: 3.9 MMOL/L — SIGNIFICANT CHANGE UP (ref 3.5–5.3)
PROCALCITONIN SERPL-MCNC: 0.06 NG/ML — SIGNIFICANT CHANGE UP
PROT SERPL-MCNC: 6.1 G/DL — SIGNIFICANT CHANGE UP (ref 6–8.3)
RBC # BLD: 2.87 M/UL — LOW (ref 4.2–5.8)
RBC # FLD: 16.3 % — HIGH (ref 10.3–14.5)
SODIUM SERPL-SCNC: 139 MMOL/L — SIGNIFICANT CHANGE UP (ref 135–145)
SODIUM SERPL-SCNC: 141 MMOL/L — SIGNIFICANT CHANGE UP (ref 135–145)
WBC # BLD: 9.71 K/UL — SIGNIFICANT CHANGE UP (ref 3.8–10.5)
WBC # FLD AUTO: 9.71 K/UL — SIGNIFICANT CHANGE UP (ref 3.8–10.5)

## 2020-03-26 PROCEDURE — 99233 SBSQ HOSP IP/OBS HIGH 50: CPT | Mod: GC

## 2020-03-26 RX ORDER — MAGNESIUM SULFATE 500 MG/ML
1 VIAL (ML) INJECTION
Refills: 0 | Status: DISCONTINUED | OUTPATIENT
Start: 2020-03-26 | End: 2020-03-26

## 2020-03-26 RX ORDER — CALCIUM GLUCONATE 100 MG/ML
2 VIAL (ML) INTRAVENOUS EVERY 4 HOURS
Refills: 0 | Status: COMPLETED | OUTPATIENT
Start: 2020-03-26 | End: 2020-03-26

## 2020-03-26 RX ORDER — MAGNESIUM SULFATE 500 MG/ML
1 VIAL (ML) INJECTION
Refills: 0 | Status: COMPLETED | OUTPATIENT
Start: 2020-03-26 | End: 2020-03-26

## 2020-03-26 RX ORDER — MAGNESIUM SULFATE 500 MG/ML
2 VIAL (ML) INJECTION ONCE
Refills: 0 | Status: DISCONTINUED | OUTPATIENT
Start: 2020-03-26 | End: 2020-03-26

## 2020-03-26 RX ADMIN — Medication 650 MILLIGRAM(S): at 21:22

## 2020-03-26 RX ADMIN — Medication 0.25 MILLIGRAM(S): at 21:22

## 2020-03-26 RX ADMIN — Medication 650 MILLIGRAM(S): at 12:43

## 2020-03-26 RX ADMIN — Medication 650 MILLIGRAM(S): at 18:38

## 2020-03-26 RX ADMIN — Medication 1334 MILLIGRAM(S): at 18:37

## 2020-03-26 RX ADMIN — HEPARIN SODIUM 5000 UNIT(S): 5000 INJECTION INTRAVENOUS; SUBCUTANEOUS at 21:22

## 2020-03-26 RX ADMIN — ATORVASTATIN CALCIUM 10 MILLIGRAM(S): 80 TABLET, FILM COATED ORAL at 21:22

## 2020-03-26 RX ADMIN — Medication 200 GRAM(S): at 00:10

## 2020-03-26 RX ADMIN — Medication 1334 MILLIGRAM(S): at 09:02

## 2020-03-26 RX ADMIN — Medication 200 GRAM(S): at 19:57

## 2020-03-26 RX ADMIN — Medication 1334 MILLIGRAM(S): at 12:43

## 2020-03-26 RX ADMIN — CALCITRIOL 0.5 MICROGRAM(S): 0.5 CAPSULE ORAL at 12:43

## 2020-03-26 RX ADMIN — Medication 2: at 12:41

## 2020-03-26 RX ADMIN — Medication 3 MILLILITER(S): at 04:39

## 2020-03-26 RX ADMIN — Medication 100 GRAM(S): at 10:42

## 2020-03-26 RX ADMIN — BUPROPION HYDROCHLORIDE 150 MILLIGRAM(S): 150 TABLET, EXTENDED RELEASE ORAL at 12:43

## 2020-03-26 RX ADMIN — Medication 100 GRAM(S): at 09:46

## 2020-03-26 RX ADMIN — Medication 650 MILLIGRAM(S): at 23:59

## 2020-03-26 RX ADMIN — Medication 1: at 09:01

## 2020-03-26 RX ADMIN — BUDESONIDE AND FORMOTEROL FUMARATE DIHYDRATE 2 PUFF(S): 160; 4.5 AEROSOL RESPIRATORY (INHALATION) at 22:25

## 2020-03-26 RX ADMIN — Medication 3 MILLILITER(S): at 15:50

## 2020-03-26 RX ADMIN — Medication 0.25 MILLIGRAM(S): at 14:25

## 2020-03-26 RX ADMIN — Medication 650 MILLIGRAM(S): at 01:30

## 2020-03-26 RX ADMIN — BUDESONIDE AND FORMOTEROL FUMARATE DIHYDRATE 2 PUFF(S): 160; 4.5 AEROSOL RESPIRATORY (INHALATION) at 09:19

## 2020-03-26 RX ADMIN — Medication 81 MILLIGRAM(S): at 12:44

## 2020-03-26 RX ADMIN — HEPARIN SODIUM 5000 UNIT(S): 5000 INJECTION INTRAVENOUS; SUBCUTANEOUS at 14:26

## 2020-03-26 RX ADMIN — Medication 2: at 17:00

## 2020-03-26 RX ADMIN — SERTRALINE 100 MILLIGRAM(S): 25 TABLET, FILM COATED ORAL at 12:43

## 2020-03-26 RX ADMIN — Medication 3 MILLILITER(S): at 09:22

## 2020-03-26 RX ADMIN — HEPARIN SODIUM 5000 UNIT(S): 5000 INJECTION INTRAVENOUS; SUBCUTANEOUS at 05:49

## 2020-03-26 RX ADMIN — Medication 200 GRAM(S): at 23:59

## 2020-03-26 RX ADMIN — Medication 650 MILLIGRAM(S): at 06:23

## 2020-03-26 RX ADMIN — Medication 3 MILLILITER(S): at 22:27

## 2020-03-26 NOTE — PHYSICAL THERAPY INITIAL EVALUATION ADULT - GAIT TRAINING, PT EVAL
Pt will ambulate 50 feet with least restrictive assistive device and assistance as needed in 3-4 weeks.

## 2020-03-26 NOTE — PROGRESS NOTE ADULT - SUBJECTIVE AND OBJECTIVE BOX
Patient is a 86y old  Male who presents with a chief complaint of Confusion, dyspnea (26 Mar 2020 08:43)    Patient seen and examined at bedside. Patient c/o discomfort in his bed. His breathing appears improved     ALLERGIES:  Aricept (Other)  Cipro (Unknown)  Demerol HCl (Unknown)    MEDICATIONS  (STANDING):  albuterol/ipratropium for Nebulization 3 milliLiter(s) Nebulizer every 6 hours  aspirin enteric coated 81 milliGRAM(s) Oral daily  atorvastatin 10 milliGRAM(s) Oral at bedtime  budesonide 160 MICROgram(s)/formoterol 4.5 MICROgram(s) Inhaler 2 Puff(s) Inhalation two times a day  buPROPion XL . 150 milliGRAM(s) Oral daily  calcitriol   Capsule 0.5 MICROGram(s) Oral daily  calcium acetate 1334 milliGRAM(s) Oral three times a day with meals  dextrose 5%. 1000 milliLiter(s) (50 mL/Hr) IV Continuous <Continuous>  dextrose 50% Injectable 12.5 Gram(s) IV Push once  dextrose 50% Injectable 25 Gram(s) IV Push once  dextrose 50% Injectable 25 Gram(s) IV Push once  heparin  Injectable 5000 Unit(s) SubCutaneous every 8 hours  insulin lispro (HumaLOG) corrective regimen sliding scale   SubCutaneous three times a day before meals  insulin lispro (HumaLOG) corrective regimen sliding scale   SubCutaneous at bedtime  LORazepam     Tablet 0.25 milliGRAM(s) Oral every 8 hours  sertraline 100 milliGRAM(s) Oral daily  sodium bicarbonate 650 milliGRAM(s) Oral four times a day    MEDICATIONS  (PRN):  acetaminophen   Tablet .. 650 milliGRAM(s) Oral every 4 hours PRN Mild Pain (1 - 3)  dextrose 40% Gel 15 Gram(s) Oral once PRN Blood Glucose LESS THAN 70 milliGRAM(s)/deciliter  glucagon  Injectable 1 milliGRAM(s) IntraMuscular once PRN Glucose LESS THAN 70 milligrams/deciliter    Vital Signs Last 24 Hrs  T(F): 97.6 (26 Mar 2020 05:29), Max: 98.7 (25 Mar 2020 16:00)  HR: 81 (26 Mar 2020 12:01) (66 - 96)  BP: 130/60 (26 Mar 2020 12:01) (113/58 - 142/57)  RR: 18 (26 Mar 2020 05:29) (18 - 18)  SpO2: 96% (26 Mar 2020 12:01) (92% - 100%)    I&O's Summary  25 Mar 2020 07:  -  26 Mar 2020 07:00  --------------------------------------------------------  IN: 915 mL / OUT: 1700 mL / NET: -785 mL    26 Mar 2020 07:01  -  26 Mar 2020 12:59  --------------------------------------------------------  IN: 180 mL / OUT: 0 mL / NET: 180 mL    PHYSICAL EXAM:  GENERAL: Anxious, irritable elderly.   HEAD:  Atraumatic, Normocephalic  EYES: Pupils assymetrical - left pupil misshapen, dilated   ENMT: Dry mucous membranes  NECK: Supple, full ROM  CHEST/LUNG: Lungs clear to auscultation bilaterally, upper airway wheeze, respirations are unlabored   HEART: RRR; S1/S2, No murmur  ABDOMEN: Soft, Nontender, Nondistended; Bowel sounds present  VASCULAR: Normal pulses, Normal capillary refill, no edema  EXTREMITIES: No calf tenderness, No cyanosis  SKIN: Warm, Intact  NERVOUS SYSTEM:  A/O x2, No focal deficits    LABS:                        8.0    9.71  )-----------( 238      ( 26 Mar 2020 06:54 )             25.4         141  |  103  |  67  ----------------------------<  167  3.9   |  20  |  3.19    Ca    5.3      26 Mar 2020 06:54  Phos  3.6       Mg     1.1         TPro  6.1  /  Alb  2.1  /  TBili  0.5  /  DBili  x   /  AST  72  /  ALT  32  /  AlkPhos  165      eGFR if Non African American: 17 mL/min/1.73M2 (20 @ 06:54)  eGFR if African American: 19 mL/min/1.73M2 (20 @ 06:54)    PT/INR - ( 23 Mar 2020 16:25 )   PT: 13.5 sec;   INR: 1.20 ratio         PTT - ( 23 Mar 2020 16:25 )  PTT:34.6 sec  Lactate, Blood: 1.4 mmol/L ( @ 16:30)    CARDIAC MARKERS ( 23 Mar 2020 17:10 )  <.017 ng/mL / x     / x     / x     / x        TSH 1.01   TSH with FT4 reflex --  Total T3 --    ABG - ( 24 Mar 2020 13:12 )  pH, Arterial: 7.24  pH, Blood: x     /  pCO2: 29    /  pO2: 120   / HCO3: x     / Base Excess: x     /  SaO2: 97        POCT Blood Glucose.: 207 mg/dL (26 Mar 2020 12:36)  POCT Blood Glucose.: 186 mg/dL (26 Mar 2020 08:37)  POCT Blood Glucose.: 192 mg/dL (25 Mar 2020 21:34)  POCT Blood Glucose.: 205 mg/dL (25 Mar 2020 16:48)    03- [id number] 5.6    Urinalysis Basic - ( 23 Mar 2020 16:25 )    Color: Yellow / Appearance: Clear / S.010 / pH: x  Gluc: x / Ketone: Negative  / Bili: Negative / Urobili: Negative   Blood: x / Protein: 500 mg/dL / Nitrite: Negative   Leuk Esterase: Negative / RBC: 5-10 /HPF / WBC 0-2 /HPF   Sq Epi: x / Non Sq Epi: Neg.-Few / Bacteria: Few /HPF      Culture - Urine (collected 23 Mar 2020 16:25)  Source: .Urine Clean Catch (Midstream)  Final Report (24 Mar 2020 17:03):    No growth    Culture - Blood (collected 23 Mar 2020 16:25)  Source: .Blood Blood-Peripheral  Preliminary Report (24 Mar 2020 22:02):    No growth to date.    Culture - Blood (collected 23 Mar 2020 16:25)  Source: .Blood Blood-Peripheral  Preliminary Report (24 Mar 2020 22:02):    No growth to date.      RADIOLOGY & ADDITIONAL TESTS:    Care Discussed with Consultants/Other Providers:

## 2020-03-26 NOTE — PHYSICAL THERAPY INITIAL EVALUATION ADULT - PERTINENT HX OF CURRENT PROBLEM, REHAB EVAL
87 yo M with PMHx of dementia, DM, HTN, depression, CKD4, COPD presents to the ED with difficulty breathing and progressive confusion. Diff dx-  Acute COPD exacerbation vs metabolic acidosis vs PNA. Covid (-).

## 2020-03-26 NOTE — PROGRESS NOTE ADULT - SUBJECTIVE AND OBJECTIVE BOX
No distress    Vital Signs Last 24 Hrs  T(C): 37.1 (03-26-20 @ 16:11), Max: 37.1 (03-26-20 @ 00:20)  T(F): 98.8 (03-26-20 @ 16:11), Max: 98.8 (03-26-20 @ 16:11)  HR: 95 (03-26-20 @ 16:11) (66 - 96)  BP: 160/77 (03-26-20 @ 16:11) (130/60 - 160/77)  RR: 18 (03-26-20 @ 16:11) (18 - 18)  SpO2: 100% (03-26-20 @ 16:11) (92% - 100%)    I&O's Summary    25 Mar 2020 07:01  -  26 Mar 2020 07:00  --------------------------------------------------------  IN: 915 mL / OUT: 1700 mL / NET: -785 mL    26 Mar 2020 07:01  -  26 Mar 2020 18:44  --------------------------------------------------------  IN: 300 mL / OUT: 400 mL / NET: -100 mL    Card S1S2  Lungs b/l air entry  Abd soft, NT, ND  Extr no edema                                 8.0    9.71  )-----------( 238      ( 26 Mar 2020 06:54 )             25.4     26 Mar 2020 06:54    141    |  103    |  67     ----------------------------<  167    3.9     |  20     |  3.19     Ca    5.3        26 Mar 2020 06:54  Phos  3.6       26 Mar 2020 06:54  Mg     1.1       26 Mar 2020 06:54    TPro  6.1    /  Alb  2.1    /  TBili  0.5    /  DBili  x      /  AST  72     /  ALT  32     /  AlkPhos  165    26 Mar 2020 06:54    LIVER FUNCTIONS - ( 26 Mar 2020 06:54 )  Alb: 2.1 g/dL / Pro: 6.1 g/dL / ALK PHOS: 165 U/L / ALT: 32 U/L / AST: 72 U/L / GGT: x           acetaminophen   Tablet .. 650 milliGRAM(s) Oral every 4 hours PRN  albuterol/ipratropium for Nebulization 3 milliLiter(s) Nebulizer every 6 hours  aspirin enteric coated 81 milliGRAM(s) Oral daily  atorvastatin 10 milliGRAM(s) Oral at bedtime  budesonide 160 MICROgram(s)/formoterol 4.5 MICROgram(s) Inhaler 2 Puff(s) Inhalation two times a day  buPROPion XL . 150 milliGRAM(s) Oral daily  calcitriol   Capsule 0.5 MICROGram(s) Oral daily  calcium acetate 1334 milliGRAM(s) Oral three times a day with meals  calcium gluconate IVPB 2 Gram(s) IV Intermittent every 4 hours  dextrose 40% Gel 15 Gram(s) Oral once PRN  dextrose 5%. 1000 milliLiter(s) IV Continuous <Continuous>  dextrose 50% Injectable 12.5 Gram(s) IV Push once  dextrose 50% Injectable 25 Gram(s) IV Push once  dextrose 50% Injectable 25 Gram(s) IV Push once  glucagon  Injectable 1 milliGRAM(s) IntraMuscular once PRN  heparin  Injectable 5000 Unit(s) SubCutaneous every 8 hours  insulin lispro (HumaLOG) corrective regimen sliding scale   SubCutaneous three times a day before meals  insulin lispro (HumaLOG) corrective regimen sliding scale   SubCutaneous at bedtime  LORazepam     Tablet 0.25 milliGRAM(s) Oral every 8 hours  sertraline 100 milliGRAM(s) Oral daily  sodium bicarbonate 650 milliGRAM(s) Oral four times a day    A/P:    Hx COPD, CKD 4 (Cr 2.8 - 5/18/19), metastatic prostate ca  COPD exacerbation, possibly lung mets (COVID negative)  Severe hypocalcemia w/QT prolongation due to Denosumab (got on 3/12) exacerbated by bone disorder of advanced CKD w/low PTH and hyperphosphatemia and now hypomagnesemia  Phoslo 2tabs PO TID  Calcitriol PO  IV Ca Gluconate, IV Mg  F/u Ca and iCa level BID, f/u CMP, Mg  Would avoid Denosumab in the future given CKD 4  Will follow

## 2020-03-26 NOTE — PHYSICAL THERAPY INITIAL EVALUATION ADULT - ADDITIONAL COMMENTS
As per [facility] notes. Pt Pt unable to provide history at this time. As per [facility] notes prior to admission, patient lived with spouse and has Medicaid aide 5d x 4h. Spouse and daughter assist with ADLs as needed in the  absence of the aide. Patient uses a cane or walker.

## 2020-03-26 NOTE — PROGRESS NOTE ADULT - ASSESSMENT
85 yo M with PMHx of dementia, DM, HTN, depression, CKD4, COPD presents to the ED with difficulty breathing and progressive confusion.    #SOB likely multifactorial, Acute COPD exacerbation vs metabolic acidosis vs. pneumonia   - Afebrile, WBCs normal, blood cultures negative, COVID-19 negative  - CXR: patchy infiltrates in RLL  - Continue albuterol q6, symbicort  - ativan 0.25mg q8 prn  - Monitor off abx for now, f/u procalcitonin   - Pulm following    #Metabolic encephalopathy: 2/2 metabolic acidosis/multiple electrolytes abnormalities likely 2/2 Xgeva (denosumab) and PABLITO on CKD.   - Continue to correct electrolytes  - trend BMP  - aspiration precautions    #PABLITO (Cr 2.8 in May 2019)  on CKD stage 4 and high anionic gap metabolic acidosis:  - Cr slowly improving   - Renal following  - hold nephrotoxic meds  - sodium bicarb tablets   - trend BMP    #Hypocalcemia: corrected Ca: 6, likely 2/2 Denosumab vs secondary hyperparathyroidism   - prolonged QTc on ECG (566) on admission - improving ( on 3/25)  - continue rocaltrol, phoslo   - Calcium gluconate IV as per renal  - renal is following    #Hypomagnesemia and hyperkalemia   - Hyperkalemia is resolved  - Will supplement mag today     # Urinary retention  - flomax   - continue hernández    #HTN:  - continue amlodipine  - BBlocker held on admission due to hyperkalemia    #Depression:  - continue Sertraline and buproprion (home dose buproprion 100mg BID, pharmacy here only has 150mg XL)    #DVT ppx:  - HSQ    #Goals of care:  - DNR/DNI 85 yo M with PMHx of dementia, DM, HTN, depression, CKD4, COPD presents to the ED with difficulty breathing and progressive confusion.    #SOB likely 2/2 metabolic acidosis   - Afebrile, WBCs normal, blood cultures negative, COVID-19 negative  - CXR: patchy infiltrates in RLL; will continue to monitor off abx for now; procalcitonin 0.86  - Continue albuterol q6, symbicort  - ativan 0.25mg q8 prn  - Pulm following    #Metabolic encephalopathy: 2/2 metabolic acidosis/multiple electrolytes abnormalities likely 2/2 Xgeva (denosumab) and PABLITO on CKD.   - improving  - Continue to correct electrolytes  - trend BMP  - aspiration precautions    #PABLITO (Cr 2.8 in May 2019)  on CKD stage 4 and high anionic gap metabolic acidosis:  - Cr slowly improving   - Renal following  - hold nephrotoxic meds  - sodium bicarb tablets   - trend BMP    #Hypocalcemia: corrected Ca: 6, likely 2/2 Denosumab vs secondary hyperparathyroidism   - prolonged QTc on ECG (566) on admission - improving ( on 3/25)  - continue rocaltrol, phoslo   - Calcium gluconate IV as per renal  - renal is following    #Hypomagnesemia and hyperkalemia   - Hyperkalemia is resolved  - Will supplement mag today     # Urinary retention  - flomax   - continue hernández    #HTN:  - continue amlodipine  - BBlocker held on admission due to hyperkalemia    #Depression:  - continue Sertraline and buproprion (home dose buproprion 100mg BID, pharmacy here only has 150mg XL)    #DVT ppx:  - HSQ    #Goals of care:  - DNR/DNI 85 yo M with PMHx of dementia, DM, HTN, depression, CKD4, COPD presents to the ED with difficulty breathing and progressive confusion.    3/26 - Spoke with daughter Lily to update on patient condition/plan of care    #SOB likely 2/2 metabolic acidosis   - Afebrile, WBCs normal, blood cultures negative, COVID-19 negative  - CXR: patchy infiltrates in RLL; will continue to monitor off abx for now; procalcitonin 0.86  - Continue albuterol q6, symbicort  - ativan 0.25mg q8 prn  - Pulm following    #Metabolic encephalopathy: 2/2 metabolic acidosis/multiple electrolytes abnormalities likely 2/2 Xgeva (denosumab) and PABLITO on CKD.   - improving  - Continue to correct electrolytes  - trend BMP  - aspiration precautions    #PABLITO (Cr 2.8 in May 2019)  on CKD stage 4 and high anionic gap metabolic acidosis:  - Cr slowly improving   - Renal following  - hold nephrotoxic meds  - sodium bicarb tablets   - trend BMP    #Hypocalcemia: corrected Ca: 6, likely 2/2 Denosumab vs secondary hyperparathyroidism   - prolonged QTc on ECG (566) on admission - improving ( on 3/25)  - continue rocaltrol, phoslo   - Calcium gluconate IV as per renal  - renal is following    #Hypomagnesemia and hyperkalemia   - Hyperkalemia is resolved  - Will supplement mag today     # Urinary retention  - flomax   - continue hernández    #HTN:  - continue amlodipine  - BBlocker held on admission due to hyperkalemia    #Depression:  - continue Sertraline and buproprion (home dose buproprion 100mg BID, pharmacy here only has 150mg XL)    #DVT ppx:  - HSQ    #Goals of care:  - DNR/DNI

## 2020-03-26 NOTE — PROGRESS NOTE ADULT - ATTENDING COMMENTS
I have personally seen and examined patient on the above date.  I discussed the case with ALBINA Coon and I agree with findings and plan as detailed per note above, which I have amended where appropriate.      Shortness of breath most likely compensatory respiratory effect from metabolic acidosis with hypocalcemia, which has IMPROVED. Less likely pneumonia. No fevers. c/w calcium gluconate IV. Daily ionized calcium. Renal following.    Consider void trial tonight.    Advanced prostate cancer: outpatient oncology follow-up .    I was physically present for the key portions of the evaluation and management (E/M) service provided.  I agree with the above history, physical, and plan which I have reviewed and edited where appropriate.

## 2020-03-26 NOTE — PHYSICAL THERAPY INITIAL EVALUATION ADULT - ACTIVE RANGE OF MOTION EXAMINATION, REHAB EVAL
Right UE Active ROM was WNL (within normal limits)/RLE Active ROM was WNL (within normal limits)/LLE Active ROM was WNL (within normal limits)/LUE not utilizing UE for functional transfer out of bed or to sit up./deficits as listed below

## 2020-03-26 NOTE — PHYSICAL THERAPY INITIAL EVALUATION ADULT - BED MOBILITY TRAINING, PT EVAL
Pt to perform all bed mobility supine to/from sit with min assist or better of 1 person in 3-4 weeks

## 2020-03-26 NOTE — PHYSICAL THERAPY INITIAL EVALUATION ADULT - TRANSFER SAFETY CONCERNS NOTED: SIT/STAND, REHAB EVAL
losing balance/decreased balance during turns/decreased safety awareness/decreased sequencing ability/stand pivot

## 2020-03-27 LAB
ANION GAP SERPL CALC-SCNC: 16 MMOL/L — SIGNIFICANT CHANGE UP (ref 5–17)
BUN SERPL-MCNC: 64 MG/DL — HIGH (ref 7–23)
CALCIUM SERPL-MCNC: 5.9 MG/DL — CRITICAL LOW (ref 8.4–10.5)
CHLORIDE SERPL-SCNC: 102 MMOL/L — SIGNIFICANT CHANGE UP (ref 96–108)
CO2 SERPL-SCNC: 22 MMOL/L — SIGNIFICANT CHANGE UP (ref 22–31)
CREAT SERPL-MCNC: 3.31 MG/DL — HIGH (ref 0.5–1.3)
GLUCOSE BLDC GLUCOMTR-MCNC: 203 MG/DL — HIGH (ref 70–99)
GLUCOSE BLDC GLUCOMTR-MCNC: 212 MG/DL — HIGH (ref 70–99)
GLUCOSE BLDC GLUCOMTR-MCNC: 217 MG/DL — HIGH (ref 70–99)
GLUCOSE BLDC GLUCOMTR-MCNC: 244 MG/DL — HIGH (ref 70–99)
GLUCOSE SERPL-MCNC: 217 MG/DL — HIGH (ref 70–99)
HCT VFR BLD CALC: 26.8 % — LOW (ref 39–50)
HGB BLD-MCNC: 8.4 G/DL — LOW (ref 13–17)
MAGNESIUM SERPL-MCNC: 1.8 MG/DL — SIGNIFICANT CHANGE UP (ref 1.6–2.6)
MCHC RBC-ENTMCNC: 27.8 PG — SIGNIFICANT CHANGE UP (ref 27–34)
MCHC RBC-ENTMCNC: 31.3 GM/DL — LOW (ref 32–36)
MCV RBC AUTO: 88.7 FL — SIGNIFICANT CHANGE UP (ref 80–100)
NRBC # BLD: 0 /100 WBCS — SIGNIFICANT CHANGE UP (ref 0–0)
PLATELET # BLD AUTO: 254 K/UL — SIGNIFICANT CHANGE UP (ref 150–400)
POTASSIUM SERPL-MCNC: 3.7 MMOL/L — SIGNIFICANT CHANGE UP (ref 3.5–5.3)
POTASSIUM SERPL-SCNC: 3.7 MMOL/L — SIGNIFICANT CHANGE UP (ref 3.5–5.3)
RAPID RVP RESULT: SIGNIFICANT CHANGE UP
RBC # BLD: 3.02 M/UL — LOW (ref 4.2–5.8)
RBC # FLD: 16.1 % — HIGH (ref 10.3–14.5)
SODIUM SERPL-SCNC: 140 MMOL/L — SIGNIFICANT CHANGE UP (ref 135–145)
WBC # BLD: 10.3 K/UL — SIGNIFICANT CHANGE UP (ref 3.8–10.5)
WBC # FLD AUTO: 10.3 K/UL — SIGNIFICANT CHANGE UP (ref 3.8–10.5)

## 2020-03-27 PROCEDURE — 99233 SBSQ HOSP IP/OBS HIGH 50: CPT

## 2020-03-27 RX ORDER — CALCIUM GLUCONATE 100 MG/ML
2 VIAL (ML) INTRAVENOUS ONCE
Refills: 0 | Status: COMPLETED | OUTPATIENT
Start: 2020-03-27 | End: 2020-03-27

## 2020-03-27 RX ORDER — METOPROLOL TARTRATE 50 MG
25 TABLET ORAL
Refills: 0 | Status: DISCONTINUED | OUTPATIENT
Start: 2020-03-27 | End: 2020-04-10

## 2020-03-27 RX ADMIN — HEPARIN SODIUM 5000 UNIT(S): 5000 INJECTION INTRAVENOUS; SUBCUTANEOUS at 13:50

## 2020-03-27 RX ADMIN — Medication 2: at 16:44

## 2020-03-27 RX ADMIN — Medication 650 MILLIGRAM(S): at 00:10

## 2020-03-27 RX ADMIN — Medication 3 MILLILITER(S): at 15:47

## 2020-03-27 RX ADMIN — ATORVASTATIN CALCIUM 10 MILLIGRAM(S): 80 TABLET, FILM COATED ORAL at 21:24

## 2020-03-27 RX ADMIN — HEPARIN SODIUM 5000 UNIT(S): 5000 INJECTION INTRAVENOUS; SUBCUTANEOUS at 05:28

## 2020-03-27 RX ADMIN — HEPARIN SODIUM 5000 UNIT(S): 5000 INJECTION INTRAVENOUS; SUBCUTANEOUS at 21:24

## 2020-03-27 RX ADMIN — Medication 2: at 08:31

## 2020-03-27 RX ADMIN — BUDESONIDE AND FORMOTEROL FUMARATE DIHYDRATE 2 PUFF(S): 160; 4.5 AEROSOL RESPIRATORY (INHALATION) at 08:30

## 2020-03-27 RX ADMIN — Medication 650 MILLIGRAM(S): at 21:25

## 2020-03-27 RX ADMIN — BUDESONIDE AND FORMOTEROL FUMARATE DIHYDRATE 2 PUFF(S): 160; 4.5 AEROSOL RESPIRATORY (INHALATION) at 21:47

## 2020-03-27 RX ADMIN — Medication 0.25 MILLIGRAM(S): at 21:24

## 2020-03-27 RX ADMIN — Medication 3 MILLILITER(S): at 03:36

## 2020-03-27 RX ADMIN — Medication 3 MILLILITER(S): at 09:43

## 2020-03-27 RX ADMIN — Medication 2: at 12:52

## 2020-03-27 RX ADMIN — Medication 650 MILLIGRAM(S): at 18:12

## 2020-03-27 RX ADMIN — CALCITRIOL 0.5 MICROGRAM(S): 0.5 CAPSULE ORAL at 12:51

## 2020-03-27 RX ADMIN — Medication 650 MILLIGRAM(S): at 17:27

## 2020-03-27 RX ADMIN — Medication 0.25 MILLIGRAM(S): at 05:28

## 2020-03-27 RX ADMIN — Medication 1334 MILLIGRAM(S): at 08:30

## 2020-03-27 RX ADMIN — Medication 650 MILLIGRAM(S): at 22:25

## 2020-03-27 RX ADMIN — Medication 650 MILLIGRAM(S): at 17:33

## 2020-03-27 RX ADMIN — Medication 1334 MILLIGRAM(S): at 16:44

## 2020-03-27 RX ADMIN — BUPROPION HYDROCHLORIDE 150 MILLIGRAM(S): 150 TABLET, EXTENDED RELEASE ORAL at 12:51

## 2020-03-27 RX ADMIN — Medication 25 MILLIGRAM(S): at 22:24

## 2020-03-27 RX ADMIN — Medication 0.25 MILLIGRAM(S): at 13:50

## 2020-03-27 RX ADMIN — Medication 200 GRAM(S): at 16:14

## 2020-03-27 RX ADMIN — Medication 650 MILLIGRAM(S): at 21:24

## 2020-03-27 RX ADMIN — Medication 650 MILLIGRAM(S): at 12:52

## 2020-03-27 RX ADMIN — Medication 650 MILLIGRAM(S): at 05:28

## 2020-03-27 RX ADMIN — SERTRALINE 100 MILLIGRAM(S): 25 TABLET, FILM COATED ORAL at 12:52

## 2020-03-27 RX ADMIN — Medication 3 MILLILITER(S): at 21:47

## 2020-03-27 RX ADMIN — Medication 1334 MILLIGRAM(S): at 12:51

## 2020-03-27 RX ADMIN — Medication 81 MILLIGRAM(S): at 12:52

## 2020-03-27 NOTE — PROGRESS NOTE ADULT - ASSESSMENT
85 yo M with PMHx of dementia, DM, HTN, depression, CKD4, COPD presents to the ED with difficulty breathing and progressive confusion.    #SOB likely 2/2 metabolic acidosis   - IMPROVED   - Afebrile, WBCs normal, blood cultures negative, COVID-19 negative  - CXR: patchy infiltrates in RLL; will continue to monitor off abx for now; procalcitonin 0.86  - Continue albuterol q6, symbicort  - ativan 0.25mg q8 prn    #Metabolic encephalopathy: 2/2 metabolic acidosis/multiple electrolytes abnormalities likely 2/2 Xgeva (denosumab) and PABLITO on CKD.   - IMPROVED  - Continue to correct electrolytes  - trend BMP  - aspiration precautions    #PABLITO (Cr 2.8 in May 2019)  on CKD stage 4 and high anionic gap metabolic acidosis:  - Cr slowly improving   - Renal following  - hold nephrotoxic meds  - sodium bicarb tablets   - trend BMP    #Hypocalcemia likely 2/2 Denosumab vs secondary hyperparathyroidism   - prolonged QTc on ECG (566) on admission - improving ( on 3/25)  - continue rocaltrol, phoslo   - Calcium gluconate IV as per renal  - renal is following    #Hypomagnesemia and hyperkalemia   - Resolved  - Continue to monitor BMP    # Urinary retention  - will DC hernández today, voiding trial  - continue flomax    #HTN:  - continue amlodipine  - BBlocker held on admission due to hyperkalemia - had been on bystolic 5mg daily. BP and HR have been acceptable off bystolic, will restart if needed    #Depression:  - continue Sertraline and buproprion (home dose buproprion 100mg BID, pharmacy here only has 150mg XL)    #DVT ppx:  - HSQ    #Goals of care:  - DNR/DNI 85 yo M with PMHx of dementia, DM, HTN, depression, CKD4, COPD presents to the ED with difficulty breathing and progressive confusion.    3/27: Spoke with patient's daughter Lily to update on patient status and plan of care    #SOB likely 2/2 metabolic acidosis   - IMPROVED   - Afebrile, WBCs normal, blood cultures negative, COVID-19 negative  - CXR: patchy infiltrates in RLL; will continue to monitor off abx for now; procalcitonin 0.86  - Continue albuterol q6, symbicort  - ativan 0.25mg q8 prn    #Metabolic encephalopathy: 2/2 metabolic acidosis/multiple electrolytes abnormalities likely 2/2 Xgeva (denosumab) and PABLITO on CKD.   - IMPROVED  - Continue to correct electrolytes  - trend BMP  - aspiration precautions    #PABLITO (Cr 2.8 in May 2019)  on CKD stage 4 and high anionic gap metabolic acidosis:  - Cr slowly improving   - Renal following  - hold nephrotoxic meds  - sodium bicarb tablets   - trend BMP    #Hypocalcemia likely 2/2 Denosumab vs secondary hyperparathyroidism   - prolonged QTc on ECG (566) on admission - improving ( on 3/25)  - continue rocaltrol, phoslo   - Calcium gluconate IV as per renal  - renal is following    #Hypomagnesemia and hyperkalemia   - Resolved  - Continue to monitor BMP    # Urinary retention  - will DC hernández today, voiding trial  - continue flomax    #HTN:  - continue amlodipine  - BBlocker held on admission due to hyperkalemia - had been on bystolic 5mg daily. BP and HR have been acceptable off bystolic, will restart if needed    #Depression:  - continue Sertraline and buproprion (home dose buproprion 100mg BID, pharmacy here only has 150mg XL)    #DVT ppx:  - HSQ    #Goals of care:  - DNR/DNI

## 2020-03-27 NOTE — PROGRESS NOTE ADULT - SUBJECTIVE AND OBJECTIVE BOX
Patient is a 86y old  Male who presents with a chief complaint of Confusion, dyspnea (26 Mar 2020 18:43)    Patient seen and examined at bedside. Patient requesting ginger ale. Breathing appears improved     ALLERGIES:  Aricept (Other)  Cipro (Unknown)  Demerol HCl (Unknown)    MEDICATIONS  (STANDING):  albuterol/ipratropium for Nebulization 3 milliLiter(s) Nebulizer every 6 hours  aspirin enteric coated 81 milliGRAM(s) Oral daily  atorvastatin 10 milliGRAM(s) Oral at bedtime  budesonide 160 MICROgram(s)/formoterol 4.5 MICROgram(s) Inhaler 2 Puff(s) Inhalation two times a day  buPROPion XL . 150 milliGRAM(s) Oral daily  calcitriol   Capsule 0.5 MICROGram(s) Oral daily  calcium acetate 1334 milliGRAM(s) Oral three times a day with meals  dextrose 5%. 1000 milliLiter(s) (50 mL/Hr) IV Continuous <Continuous>  dextrose 50% Injectable 12.5 Gram(s) IV Push once  dextrose 50% Injectable 25 Gram(s) IV Push once  dextrose 50% Injectable 25 Gram(s) IV Push once  heparin  Injectable 5000 Unit(s) SubCutaneous every 8 hours  insulin lispro (HumaLOG) corrective regimen sliding scale   SubCutaneous three times a day before meals  insulin lispro (HumaLOG) corrective regimen sliding scale   SubCutaneous at bedtime  LORazepam     Tablet 0.25 milliGRAM(s) Oral every 8 hours  sertraline 100 milliGRAM(s) Oral daily  sodium bicarbonate 650 milliGRAM(s) Oral four times a day    MEDICATIONS  (PRN):  acetaminophen   Tablet .. 650 milliGRAM(s) Oral every 4 hours PRN Mild Pain (1 - 3)  dextrose 40% Gel 15 Gram(s) Oral once PRN Blood Glucose LESS THAN 70 milliGRAM(s)/deciliter  glucagon  Injectable 1 milliGRAM(s) IntraMuscular once PRN Glucose LESS THAN 70 milligrams/deciliter    Vital Signs Last 24 Hrs  T(F): 98 (27 Mar 2020 05:33), Max: 98.8 (26 Mar 2020 16:11)  HR: 105 (27 Mar 2020 05:33) (62 - 105)  BP: 117/89 (27 Mar 2020 05:33) (117/89 - 160/77)  RR: 17 (27 Mar 2020 05:33) (16 - 18)  SpO2: 100% (27 Mar 2020 05:33) (93% - 100%)    I&O's Summary  26 Mar 2020 07:01  -  27 Mar 2020 07:00  --------------------------------------------------------  IN: 300 mL / OUT: 1300 mL / NET: -1000 mL    PHYSICAL EXAM:  GENERAL: NAD  HEAD:  Atraumatic, Normocephalic  EYES: Pupils assymetrical - left pupil misshapen, dilated   ENMT: Moist mucous membranes  NECK: Supple, full ROM  CHEST/LUNG: Lungs clear to auscultation bilaterally, upper airway wheeze, respirations are unlabored   HEART: RRR; S1/S2, No murmur  ABDOMEN: Soft, Nontender, Nondistended; Bowel sounds present  VASCULAR: Normal pulses, Normal capillary refill, no edema  EXTREMITIES: No calf tenderness, No cyanosis  SKIN: Warm, Intact  NERVOUS SYSTEM:  A/O x2, No focal deficits    LABS:                        8.0    9.71  )-----------( 238      ( 26 Mar 2020 06:54 )             25.4     03-27    140  |  102  |  64  ----------------------------<  217  3.7   |  22  |  3.31    Ca    5.9      27 Mar 2020 05:55  Phos  3.6     03-26  Mg     1.8     03-27    TPro  6.1  /  Alb  2.1  /  TBili  0.5  /  DBili  x   /  AST  72  /  ALT  32  /  AlkPhos  165  03-26    eGFR if Non African American: 16 mL/min/1.73M2 (03-27-20 @ 05:55)  eGFR if African American: 18 mL/min/1.73M2 (03-27-20 @ 05:55)    ABG - ( 24 Mar 2020 13:12 )  pH, Arterial: 7.24  pH, Blood: x     /  pCO2: 29    /  pO2: 120   / HCO3: x     / Base Excess: x     /  SaO2: 97        POCT Blood Glucose.: 217 mg/dL (27 Mar 2020 08:05)  POCT Blood Glucose.: 198 mg/dL (26 Mar 2020 21:06)  POCT Blood Glucose.: 218 mg/dL (26 Mar 2020 16:54)  POCT Blood Glucose.: 207 mg/dL (26 Mar 2020 12:36)    03-24 [id number] 5.6    Culture - Urine (collected 23 Mar 2020 16:25)  Source: .Urine Clean Catch (Midstream)  Final Report (24 Mar 2020 17:03):    No growth    Culture - Blood (collected 23 Mar 2020 16:25)  Source: .Blood Blood-Peripheral  Preliminary Report (24 Mar 2020 22:02):    No growth to date.    Culture - Blood (collected 23 Mar 2020 16:25)  Source: .Blood Blood-Peripheral  Preliminary Report (24 Mar 2020 22:02):    No growth to date.      RADIOLOGY & ADDITIONAL TESTS:    Care Discussed with Consultants/Other Providers: yes

## 2020-03-27 NOTE — PROGRESS NOTE ADULT - SUBJECTIVE AND OBJECTIVE BOX
Resting    Vital Signs Last 24 Hrs  T(C): 37 (03-27-20 @ 17:43), Max: 37 (03-27-20 @ 17:43)  T(F): 98.6 (03-27-20 @ 17:43), Max: 98.6 (03-27-20 @ 17:43)  HR: 68 (03-27-20 @ 17:43) (50 - 105)  BP: 131/63 (03-27-20 @ 17:43) (117/89 - 156/71)  RR: 18 (03-27-20 @ 17:43) (16 - 18)  SpO2: 99% (03-27-20 @ 17:43) (93% - 100%)    I&O's Summary    26 Mar 2020 07:01  -  27 Mar 2020 07:00  --------------------------------------------------------  IN: 300 mL / OUT: 1300 mL / NET: -1000 mL    Card S1S2  Lungs b/l air entry  Abd soft, NT, ND  Extr no edema                                         8.4    10.30 )-----------( 254      ( 27 Mar 2020 05:55 )             26.8     27 Mar 2020 05:55    140    |  102    |  64     ----------------------------<  217    3.7     |  22     |  3.31     Ca    5.9        27 Mar 2020 05:55  Phos  3.6       26 Mar 2020 06:54  Mg     1.8       27 Mar 2020 05:55    TPro  6.1    /  Alb  2.1    /  TBili  0.5    /  DBili  x      /  AST  72     /  ALT  32     /  AlkPhos  165    26 Mar 2020 06:54    LIVER FUNCTIONS - ( 26 Mar 2020 06:54 )  Alb: 2.1 g/dL / Pro: 6.1 g/dL / ALK PHOS: 165 U/L / ALT: 32 U/L / AST: 72 U/L / GGT: x           acetaminophen   Tablet .. 650 milliGRAM(s) Oral every 4 hours PRN  albuterol/ipratropium for Nebulization 3 milliLiter(s) Nebulizer every 6 hours  aspirin enteric coated 81 milliGRAM(s) Oral daily  atorvastatin 10 milliGRAM(s) Oral at bedtime  budesonide 160 MICROgram(s)/formoterol 4.5 MICROgram(s) Inhaler 2 Puff(s) Inhalation two times a day  buPROPion XL . 150 milliGRAM(s) Oral daily  calcitriol   Capsule 0.5 MICROGram(s) Oral daily  calcium acetate 1334 milliGRAM(s) Oral three times a day with meals  dextrose 40% Gel 15 Gram(s) Oral once PRN  dextrose 5%. 1000 milliLiter(s) IV Continuous <Continuous>  dextrose 50% Injectable 12.5 Gram(s) IV Push once  dextrose 50% Injectable 25 Gram(s) IV Push once  dextrose 50% Injectable 25 Gram(s) IV Push once  glucagon  Injectable 1 milliGRAM(s) IntraMuscular once PRN  heparin  Injectable 5000 Unit(s) SubCutaneous every 8 hours  insulin lispro (HumaLOG) corrective regimen sliding scale   SubCutaneous three times a day before meals  insulin lispro (HumaLOG) corrective regimen sliding scale   SubCutaneous at bedtime  LORazepam     Tablet 0.25 milliGRAM(s) Oral every 8 hours  metoprolol tartrate 25 milliGRAM(s) Oral two times a day  sertraline 100 milliGRAM(s) Oral daily  sodium bicarbonate 650 milliGRAM(s) Oral four times a day    A/P:    Hx COPD, CKD 4 (Cr 2.8 - 5/18/19), metastatic prostate ca  COPD exacerbation, possibly lung mets (COVID negative)  Severe hypocalcemia w/QT prolongation due to Denosumab (got on 3/12) exacerbated by bone disorder of advanced CKD w/low PTH and hyperphosphatemia, intermittent hypomagnesemia  Phoslo 2tabs PO TID  Calcitriol PO  IV Ca Gluconate as needed  Suppl Mg as needed  F/u Ca, iCa level, CMP, Mg  Would avoid Denosumab in the future given CKD 4  Will follow

## 2020-03-28 LAB
ALBUMIN SERPL ELPH-MCNC: 1.9 G/DL — LOW (ref 3.3–5)
ALP SERPL-CCNC: 137 U/L — HIGH (ref 40–120)
ALT FLD-CCNC: 26 U/L — SIGNIFICANT CHANGE UP (ref 10–45)
ANION GAP SERPL CALC-SCNC: 11 MMOL/L — SIGNIFICANT CHANGE UP (ref 5–17)
ANION GAP SERPL CALC-SCNC: 12 MMOL/L — SIGNIFICANT CHANGE UP (ref 5–17)
AST SERPL-CCNC: 34 U/L — SIGNIFICANT CHANGE UP (ref 10–40)
BILIRUB SERPL-MCNC: 0.3 MG/DL — SIGNIFICANT CHANGE UP (ref 0.2–1.2)
BUN SERPL-MCNC: 64 MG/DL — HIGH (ref 7–23)
BUN SERPL-MCNC: 65 MG/DL — HIGH (ref 7–23)
CA-I BLD-SCNC: 0.78 MMOL/L — LOW (ref 1.12–1.3)
CALCIUM SERPL-MCNC: 5.5 MG/DL — CRITICAL LOW (ref 8.4–10.5)
CALCIUM SERPL-MCNC: 6.6 MG/DL — LOW (ref 8.4–10.5)
CHLORIDE SERPL-SCNC: 101 MMOL/L — SIGNIFICANT CHANGE UP (ref 96–108)
CHLORIDE SERPL-SCNC: 104 MMOL/L — SIGNIFICANT CHANGE UP (ref 96–108)
CO2 SERPL-SCNC: 24 MMOL/L — SIGNIFICANT CHANGE UP (ref 22–31)
CO2 SERPL-SCNC: 25 MMOL/L — SIGNIFICANT CHANGE UP (ref 22–31)
CREAT SERPL-MCNC: 3.1 MG/DL — HIGH (ref 0.5–1.3)
CREAT SERPL-MCNC: 3.13 MG/DL — HIGH (ref 0.5–1.3)
CULTURE RESULTS: SIGNIFICANT CHANGE UP
CULTURE RESULTS: SIGNIFICANT CHANGE UP
GLUCOSE BLDC GLUCOMTR-MCNC: 142 MG/DL — HIGH (ref 70–99)
GLUCOSE BLDC GLUCOMTR-MCNC: 171 MG/DL — HIGH (ref 70–99)
GLUCOSE BLDC GLUCOMTR-MCNC: 181 MG/DL — HIGH (ref 70–99)
GLUCOSE BLDC GLUCOMTR-MCNC: 225 MG/DL — HIGH (ref 70–99)
GLUCOSE SERPL-MCNC: 112 MG/DL — HIGH (ref 70–99)
GLUCOSE SERPL-MCNC: 158 MG/DL — HIGH (ref 70–99)
HCT VFR BLD CALC: 23.2 % — LOW (ref 39–50)
HGB BLD-MCNC: 7.4 G/DL — LOW (ref 13–17)
MAGNESIUM SERPL-MCNC: 1.7 MG/DL — SIGNIFICANT CHANGE UP (ref 1.6–2.6)
MCHC RBC-ENTMCNC: 28.7 PG — SIGNIFICANT CHANGE UP (ref 27–34)
MCHC RBC-ENTMCNC: 31.9 GM/DL — LOW (ref 32–36)
MCV RBC AUTO: 89.9 FL — SIGNIFICANT CHANGE UP (ref 80–100)
NRBC # BLD: 0 /100 WBCS — SIGNIFICANT CHANGE UP (ref 0–0)
PHOSPHATE SERPL-MCNC: 2.8 MG/DL — SIGNIFICANT CHANGE UP (ref 2.5–4.5)
PLATELET # BLD AUTO: 220 K/UL — SIGNIFICANT CHANGE UP (ref 150–400)
POTASSIUM SERPL-MCNC: 3.9 MMOL/L — SIGNIFICANT CHANGE UP (ref 3.5–5.3)
POTASSIUM SERPL-MCNC: 3.9 MMOL/L — SIGNIFICANT CHANGE UP (ref 3.5–5.3)
POTASSIUM SERPL-SCNC: 3.9 MMOL/L — SIGNIFICANT CHANGE UP (ref 3.5–5.3)
POTASSIUM SERPL-SCNC: 3.9 MMOL/L — SIGNIFICANT CHANGE UP (ref 3.5–5.3)
PROT SERPL-MCNC: 5.6 G/DL — LOW (ref 6–8.3)
RBC # BLD: 2.58 M/UL — LOW (ref 4.2–5.8)
RBC # FLD: 15.6 % — HIGH (ref 10.3–14.5)
SODIUM SERPL-SCNC: 138 MMOL/L — SIGNIFICANT CHANGE UP (ref 135–145)
SODIUM SERPL-SCNC: 139 MMOL/L — SIGNIFICANT CHANGE UP (ref 135–145)
SPECIMEN SOURCE: SIGNIFICANT CHANGE UP
SPECIMEN SOURCE: SIGNIFICANT CHANGE UP
WBC # BLD: 9.94 K/UL — SIGNIFICANT CHANGE UP (ref 3.8–10.5)
WBC # FLD AUTO: 9.94 K/UL — SIGNIFICANT CHANGE UP (ref 3.8–10.5)

## 2020-03-28 PROCEDURE — 99233 SBSQ HOSP IP/OBS HIGH 50: CPT

## 2020-03-28 RX ORDER — CALCIUM GLUCONATE 100 MG/ML
2 VIAL (ML) INTRAVENOUS
Refills: 0 | Status: COMPLETED | OUTPATIENT
Start: 2020-03-28 | End: 2020-03-28

## 2020-03-28 RX ADMIN — Medication 0.25 MILLIGRAM(S): at 21:37

## 2020-03-28 RX ADMIN — Medication 650 MILLIGRAM(S): at 17:25

## 2020-03-28 RX ADMIN — HEPARIN SODIUM 5000 UNIT(S): 5000 INJECTION INTRAVENOUS; SUBCUTANEOUS at 21:37

## 2020-03-28 RX ADMIN — Medication 50 GRAM(S): at 16:48

## 2020-03-28 RX ADMIN — BUDESONIDE AND FORMOTEROL FUMARATE DIHYDRATE 2 PUFF(S): 160; 4.5 AEROSOL RESPIRATORY (INHALATION) at 21:58

## 2020-03-28 RX ADMIN — Medication 1334 MILLIGRAM(S): at 11:51

## 2020-03-28 RX ADMIN — Medication 1334 MILLIGRAM(S): at 08:46

## 2020-03-28 RX ADMIN — ATORVASTATIN CALCIUM 10 MILLIGRAM(S): 80 TABLET, FILM COATED ORAL at 21:37

## 2020-03-28 RX ADMIN — Medication 3 MILLILITER(S): at 08:33

## 2020-03-28 RX ADMIN — Medication 1: at 08:46

## 2020-03-28 RX ADMIN — BUPROPION HYDROCHLORIDE 150 MILLIGRAM(S): 150 TABLET, EXTENDED RELEASE ORAL at 11:52

## 2020-03-28 RX ADMIN — Medication 50 GRAM(S): at 11:51

## 2020-03-28 RX ADMIN — Medication 25 MILLIGRAM(S): at 05:25

## 2020-03-28 RX ADMIN — Medication 2: at 17:25

## 2020-03-28 RX ADMIN — Medication 650 MILLIGRAM(S): at 09:14

## 2020-03-28 RX ADMIN — Medication 1334 MILLIGRAM(S): at 17:25

## 2020-03-28 RX ADMIN — Medication 50 GRAM(S): at 14:11

## 2020-03-28 RX ADMIN — Medication 3 MILLILITER(S): at 21:58

## 2020-03-28 RX ADMIN — Medication 650 MILLIGRAM(S): at 05:24

## 2020-03-28 RX ADMIN — HEPARIN SODIUM 5000 UNIT(S): 5000 INJECTION INTRAVENOUS; SUBCUTANEOUS at 13:11

## 2020-03-28 RX ADMIN — Medication 650 MILLIGRAM(S): at 10:56

## 2020-03-28 RX ADMIN — Medication 3 MILLILITER(S): at 17:48

## 2020-03-28 RX ADMIN — CALCITRIOL 0.5 MICROGRAM(S): 0.5 CAPSULE ORAL at 11:52

## 2020-03-28 RX ADMIN — Medication 25 MILLIGRAM(S): at 17:25

## 2020-03-28 RX ADMIN — Medication 1: at 13:10

## 2020-03-28 RX ADMIN — Medication 3 MILLILITER(S): at 05:03

## 2020-03-28 RX ADMIN — SERTRALINE 100 MILLIGRAM(S): 25 TABLET, FILM COATED ORAL at 11:52

## 2020-03-28 RX ADMIN — HEPARIN SODIUM 5000 UNIT(S): 5000 INJECTION INTRAVENOUS; SUBCUTANEOUS at 05:24

## 2020-03-28 RX ADMIN — Medication 81 MILLIGRAM(S): at 11:52

## 2020-03-28 RX ADMIN — Medication 0.25 MILLIGRAM(S): at 05:25

## 2020-03-28 RX ADMIN — Medication 0.25 MILLIGRAM(S): at 13:11

## 2020-03-28 RX ADMIN — BUDESONIDE AND FORMOTEROL FUMARATE DIHYDRATE 2 PUFF(S): 160; 4.5 AEROSOL RESPIRATORY (INHALATION) at 08:33

## 2020-03-28 RX ADMIN — Medication 650 MILLIGRAM(S): at 11:51

## 2020-03-28 NOTE — PROGRESS NOTE ADULT - SUBJECTIVE AND OBJECTIVE BOX
Patient is a 86y old  Male who presents with a chief complaint of Confusion, dyspnea.  Pt continues with dyspnea this am .  pt just finished resp. treatment this am       Patient seen and examined at bedside.    ALLERGIES:  Aricept (Other)  Cipro (Unknown)  Demerol HCl (Unknown)    MEDICATIONS  (STANDING):  albuterol/ipratropium for Nebulization 3 milliLiter(s) Nebulizer every 6 hours  aspirin enteric coated 81 milliGRAM(s) Oral daily  atorvastatin 10 milliGRAM(s) Oral at bedtime  budesonide 160 MICROgram(s)/formoterol 4.5 MICROgram(s) Inhaler 2 Puff(s) Inhalation two times a day  buPROPion XL . 150 milliGRAM(s) Oral daily  calcitriol   Capsule 0.5 MICROGram(s) Oral daily  calcium acetate 1334 milliGRAM(s) Oral three times a day with meals  dextrose 5%. 1000 milliLiter(s) (50 mL/Hr) IV Continuous <Continuous>  dextrose 50% Injectable 12.5 Gram(s) IV Push once  dextrose 50% Injectable 25 Gram(s) IV Push once  dextrose 50% Injectable 25 Gram(s) IV Push once  heparin  Injectable 5000 Unit(s) SubCutaneous every 8 hours  insulin lispro (HumaLOG) corrective regimen sliding scale   SubCutaneous three times a day before meals  insulin lispro (HumaLOG) corrective regimen sliding scale   SubCutaneous at bedtime  LORazepam     Tablet 0.25 milliGRAM(s) Oral every 8 hours  metoprolol tartrate 25 milliGRAM(s) Oral two times a day  sertraline 100 milliGRAM(s) Oral daily  sodium bicarbonate 650 milliGRAM(s) Oral four times a day    MEDICATIONS  (PRN):  acetaminophen   Tablet .. 650 milliGRAM(s) Oral every 4 hours PRN Mild Pain (1 - 3)  dextrose 40% Gel 15 Gram(s) Oral once PRN Blood Glucose LESS THAN 70 milliGRAM(s)/deciliter  glucagon  Injectable 1 milliGRAM(s) IntraMuscular once PRN Glucose LESS THAN 70 milligrams/deciliter    Vital Signs Last 24 Hrs  T(F): 99.3 (28 Mar 2020 08:27), Max: 99.6 (27 Mar 2020 21:19)  HR: 83 (28 Mar 2020 08:33) (54 - 102)  BP: 126/61 (28 Mar 2020 08:27) (113/66 - 131/63)  RR: 20 (28 Mar 2020 08:27) (18 - 20)  SpO2: 95% (28 Mar 2020 08:33) (94% - 100%)  I&O's Summary    27 Mar 2020 07:01  -  28 Mar 2020 07:00  --------------------------------------------------------  IN: 320 mL / OUT: 0 mL / NET: 320 mL      PHYSICAL EXAM:  General: NAD, A/O X2   ENT: MMM  Neck: Supple, No JVD  Lungs: bilat decreased bs with abd accessory muscle use for breathing- labored breathing   Cardio: RRR, S1/S2, No murmurs  Abdomen: Soft, Nontender, Nondistended; Bowel sounds present  Extremities: No calf tenderness, No pitting edema    LABS:                        7.4    9.94  )-----------( 220      ( 28 Mar 2020 05:49 )             23.2     03-28    139  |  104  |  64  ----------------------------<  158  3.9   |  24  |  3.13    Ca    5.5      28 Mar 2020 05:49  Phos  2.8     03-28  Mg     1.7     03-28    TPro  5.6  /  Alb  1.9  /  TBili  0.3  /  DBili  x   /  AST  34  /  ALT  26  /  AlkPhos  137  03-28    eGFR if Non African American: 17 mL/min/1.73M2 (03-28-20 @ 05:49)  eGFR if African American: 20 mL/min/1.73M2 (03-28-20 @ 05:49)                          POCT Blood Glucose.: 171 mg/dL (28 Mar 2020 08:05)  POCT Blood Glucose.: 203 mg/dL (27 Mar 2020 21:44)  POCT Blood Glucose.: 212 mg/dL (27 Mar 2020 16:27)  POCT Blood Glucose.: 244 mg/dL (27 Mar 2020 12:20)    03-24 [id number] 5.6        Culture - Urine (collected 23 Mar 2020 16:25)  Source: .Urine Clean Catch (Midstream)  Final Report (24 Mar 2020 17:03):    No growth    Culture - Blood (collected 23 Mar 2020 16:25)  Source: .Blood Blood-Peripheral  Preliminary Report (24 Mar 2020 22:02):    No growth to date.    Culture - Blood (collected 23 Mar 2020 16:25)  Source: .Blood Blood-Peripheral  Preliminary Report (24 Mar 2020 22:02):    No growth to date.      RADIOLOGY & ADDITIONAL TESTS:    Care Discussed with Consultants/Other Providers:

## 2020-03-28 NOTE — PROGRESS NOTE ADULT - ASSESSMENT
85 yo M with PMHx of dementia, DM, HTN, depression, CKD4, COPD presents to the ED with difficulty breathing and progressive confusion.    3/27: Spoke with patient's daughter Lily to update on patient status and plan of care    #SOB likely 2/2 metabolic acidosis   - IMPROVED   - Afebrile, WBCs normal, blood cultures negative, COVID-19 negative  - CXR: patchy infiltrates in RLL; will continue to monitor off abx for now; procalcitonin 0.86  - Continue albuterol q6, symbicort  - ativan 0.25mg q8 prn    #Metabolic encephalopathy: 2/2 metabolic acidosis/multiple electrolytes abnormalities likely 2/2 Xgeva (denosumab) and PABLITO on CKD.   - IMPROVED  - Continue to correct electrolytes  - trend BMP  - aspiration precautions    #PABLITO (Cr 2.8 in May 2019)  on CKD stage 4 and high anionic gap metabolic acidosis:  - Cr slowly improving   - Renal following  - hold nephrotoxic meds  - sodium bicarb tablets   - trend BMP    #anemia   possible transfusion H/H 7.4 /23    #Hypocalcemia likely 2/2 Denosumab vs secondary hyperparathyroidism   - prolonged QTc on ECG (566) on admission - improving ( on 3/25)  - continue rocaltrol, phoslo   - Calcium gluconate IV as per renal  - renal is following  - monitor calcium levels     #Hypomagnesemia and hyperkalemia   - Resolved  - Continue to monitor BMP    # Urinary retention  - voiding trial--monitor output- PVR now   - continue flomax    #HTN:  - continue amlodipine  - BBlocker held on admission due to hyperkalemia - had been on bystolic 5mg daily. BP and HR have been acceptable off bystolic, will restart if needed    #Depression:  - continue Sertraline and buproprion (home dose buproprion 100mg BID, pharmacy here only has 150mg XL)    #DVT ppx:  - HSQ    #Goals of care:  - DNR/DNI

## 2020-03-28 NOTE — PROGRESS NOTE ADULT - SUBJECTIVE AND OBJECTIVE BOX
[facility] NEPHROLOGY SERVICES, [facility]  NEPHROLOGY AND HYPERTENSION  [address]  [address]  [place] [address]  [phone]    MD JESENIA HAWLEY MD ANDREY GONCHARUK, MD MADHU KORRAPATI, MD YELENA ROSENBERG, MD BINNY KOSHY, MD CHRISTOPHER CAPUTO, MD EDWARD BOVER, MD          Patient with nausea vomiting earlier;   feels better but still " swollen"      MEDICATIONS  (STANDING):  albuterol/ipratropium for Nebulization 3 milliLiter(s) Nebulizer every 6 hours  aspirin enteric coated 81 milliGRAM(s) Oral daily  atorvastatin 10 milliGRAM(s) Oral at bedtime  budesonide 160 MICROgram(s)/formoterol 4.5 MICROgram(s) Inhaler 2 Puff(s) Inhalation two times a day  buPROPion XL . 150 milliGRAM(s) Oral daily  calcitriol   Capsule 0.5 MICROGram(s) Oral daily  calcium acetate 1334 milliGRAM(s) Oral three times a day with meals  calcium gluconate IVPB 2 Gram(s) IV Intermittent every 2 hours  dextrose 5%. 1000 milliLiter(s) (50 mL/Hr) IV Continuous <Continuous>  dextrose 50% Injectable 12.5 Gram(s) IV Push once  dextrose 50% Injectable 25 Gram(s) IV Push once  dextrose 50% Injectable 25 Gram(s) IV Push once  heparin  Injectable 5000 Unit(s) SubCutaneous every 8 hours  insulin lispro (HumaLOG) corrective regimen sliding scale   SubCutaneous three times a day before meals  insulin lispro (HumaLOG) corrective regimen sliding scale   SubCutaneous at bedtime  LORazepam     Tablet 0.25 milliGRAM(s) Oral every 8 hours  metoprolol tartrate 25 milliGRAM(s) Oral two times a day  sertraline 100 milliGRAM(s) Oral daily  sodium bicarbonate 650 milliGRAM(s) Oral four times a day    MEDICATIONS  (PRN):  acetaminophen   Tablet .. 650 milliGRAM(s) Oral every 4 hours PRN Mild Pain (1 - 3)  dextrose 40% Gel 15 Gram(s) Oral once PRN Blood Glucose LESS THAN 70 milliGRAM(s)/deciliter  glucagon  Injectable 1 milliGRAM(s) IntraMuscular once PRN Glucose LESS THAN 70 milligrams/deciliter      03-27-20 @ 07:01  -  03-28-20 @ 07:00  --------------------------------------------------------  IN: 320 mL / OUT: 0 mL / NET: 320 mL      PHYSICAL EXAM:      T(C): 37.4 (03-28-20 @ 08:27), Max: 37.6 (03-27-20 @ 21:19)  HR: 83 (03-28-20 @ 08:33) (54 - 102)  BP: 126/61 (03-28-20 @ 08:27) (113/66 - 131/63)  RR: 20 (03-28-20 @ 08:27) (18 - 20)  SpO2: 95% (03-28-20 @ 08:33) (94% - 100%)  Wt(kg): --  Respiratory: clear anteriorly, crackles at bases  Cardiovascular: S1 S2  Gastrointestinal: soft NT ND +BS  Extremities: 1   edema                                    7.4    9.94  )-----------( 220      ( 28 Mar 2020 05:49 )             23.2     03-28    139  |  104  |  64<H>  ----------------------------<  158<H>  3.9   |  24  |  3.13<H>    Ca    5.5<LL>      28 Mar 2020 05:49  Phos  2.8     03-28  Mg     1.7     03-28    TPro  5.6<L>  /  Alb  1.9<L>  /  TBili  0.3  /  DBili  x   /  AST  34  /  ALT  26  /  AlkPhos  137<H>  03-28      LIVER FUNCTIONS - ( 28 Mar 2020 05:49 )  Alb: 1.9 g/dL / Pro: 5.6 g/dL / ALK PHOS: 137 U/L / ALT: 26 U/L / AST: 34 U/L / GGT: x           Creatinine Trend: 3.13<--, 3.31<--, 3.29<--, 3.19<--, 3.32<--, 3.25<--      A/P:    Hx COPD, CKD 4 (Cr 2.8 - 5/18/19), metastatic prostate ca  COPD exacerbation, possibly lung mets (COVID negative)  Severe hypocalcemia w/QT prolongation due to Denosumab (got on 3/12) exacerbated by bone disorder of advanced CKD w/low PTH and hyperphosphatemia, intermittent hypomagnesemia  Phoslo 2tabs PO TID  Calcitriol PO  IV Ca Gluconate 2g over 2 hrs X3  Suppl Mg as needed  F/u Ca, iCa level, CMP, Mg  Would avoid Denosumab in the future given CKD 4  Will follow        Louis Deleon MD

## 2020-03-29 LAB
ANION GAP SERPL CALC-SCNC: 18 MMOL/L — HIGH (ref 5–17)
BUN SERPL-MCNC: 64 MG/DL — HIGH (ref 7–23)
CA-I BLD-SCNC: 0.81 MMOL/L — LOW (ref 1.12–1.3)
CALCIUM SERPL-MCNC: 6.2 MG/DL — CRITICAL LOW (ref 8.4–10.5)
CHLORIDE SERPL-SCNC: 101 MMOL/L — SIGNIFICANT CHANGE UP (ref 96–108)
CO2 SERPL-SCNC: 17 MMOL/L — LOW (ref 22–31)
CREAT SERPL-MCNC: 3.12 MG/DL — HIGH (ref 0.5–1.3)
GLUCOSE BLDC GLUCOMTR-MCNC: 117 MG/DL — HIGH (ref 70–99)
GLUCOSE BLDC GLUCOMTR-MCNC: 189 MG/DL — HIGH (ref 70–99)
GLUCOSE BLDC GLUCOMTR-MCNC: 198 MG/DL — HIGH (ref 70–99)
GLUCOSE BLDC GLUCOMTR-MCNC: 199 MG/DL — HIGH (ref 70–99)
GLUCOSE SERPL-MCNC: 116 MG/DL — HIGH (ref 70–99)
POTASSIUM SERPL-MCNC: 4.3 MMOL/L — SIGNIFICANT CHANGE UP (ref 3.5–5.3)
POTASSIUM SERPL-SCNC: 4.3 MMOL/L — SIGNIFICANT CHANGE UP (ref 3.5–5.3)
SODIUM SERPL-SCNC: 136 MMOL/L — SIGNIFICANT CHANGE UP (ref 135–145)

## 2020-03-29 PROCEDURE — 71045 X-RAY EXAM CHEST 1 VIEW: CPT | Mod: 26

## 2020-03-29 PROCEDURE — 99222 1ST HOSP IP/OBS MODERATE 55: CPT

## 2020-03-29 PROCEDURE — 99223 1ST HOSP IP/OBS HIGH 75: CPT

## 2020-03-29 PROCEDURE — 99233 SBSQ HOSP IP/OBS HIGH 50: CPT

## 2020-03-29 RX ORDER — FUROSEMIDE 40 MG
40 TABLET ORAL ONCE
Refills: 0 | Status: COMPLETED | OUTPATIENT
Start: 2020-03-29 | End: 2020-03-29

## 2020-03-29 RX ORDER — CALCIUM GLUCONATE 100 MG/ML
2 VIAL (ML) INTRAVENOUS
Refills: 0 | Status: DISCONTINUED | OUTPATIENT
Start: 2020-03-29 | End: 2020-03-29

## 2020-03-29 RX ORDER — CALCIUM GLUCONATE 100 MG/ML
2 VIAL (ML) INTRAVENOUS EVERY 4 HOURS
Refills: 0 | Status: COMPLETED | OUTPATIENT
Start: 2020-03-29 | End: 2020-03-29

## 2020-03-29 RX ORDER — ACETYLCYSTEINE 200 MG/ML
4 VIAL (ML) MISCELLANEOUS THREE TIMES A DAY
Refills: 0 | Status: COMPLETED | OUTPATIENT
Start: 2020-03-29 | End: 2020-03-30

## 2020-03-29 RX ADMIN — Medication 25 GRAM(S): at 15:59

## 2020-03-29 RX ADMIN — BUPROPION HYDROCHLORIDE 150 MILLIGRAM(S): 150 TABLET, EXTENDED RELEASE ORAL at 13:02

## 2020-03-29 RX ADMIN — Medication 1: at 17:33

## 2020-03-29 RX ADMIN — Medication 0.25 MILLIGRAM(S): at 13:29

## 2020-03-29 RX ADMIN — Medication 1334 MILLIGRAM(S): at 17:36

## 2020-03-29 RX ADMIN — Medication 25 GRAM(S): at 10:29

## 2020-03-29 RX ADMIN — Medication 650 MILLIGRAM(S): at 13:01

## 2020-03-29 RX ADMIN — Medication 40 MILLIGRAM(S): at 21:08

## 2020-03-29 RX ADMIN — Medication 0.25 MILLIGRAM(S): at 23:05

## 2020-03-29 RX ADMIN — Medication 0.25 MILLIGRAM(S): at 06:42

## 2020-03-29 RX ADMIN — HEPARIN SODIUM 5000 UNIT(S): 5000 INJECTION INTRAVENOUS; SUBCUTANEOUS at 21:08

## 2020-03-29 RX ADMIN — Medication 650 MILLIGRAM(S): at 13:30

## 2020-03-29 RX ADMIN — Medication 1334 MILLIGRAM(S): at 13:01

## 2020-03-29 RX ADMIN — ATORVASTATIN CALCIUM 10 MILLIGRAM(S): 80 TABLET, FILM COATED ORAL at 21:08

## 2020-03-29 RX ADMIN — Medication 1: at 08:37

## 2020-03-29 RX ADMIN — Medication 650 MILLIGRAM(S): at 17:37

## 2020-03-29 RX ADMIN — Medication 25 MILLIGRAM(S): at 06:42

## 2020-03-29 RX ADMIN — Medication 650 MILLIGRAM(S): at 12:59

## 2020-03-29 RX ADMIN — Medication 1334 MILLIGRAM(S): at 09:18

## 2020-03-29 RX ADMIN — Medication 4 MILLILITER(S): at 21:56

## 2020-03-29 RX ADMIN — SERTRALINE 100 MILLIGRAM(S): 25 TABLET, FILM COATED ORAL at 13:02

## 2020-03-29 RX ADMIN — HEPARIN SODIUM 5000 UNIT(S): 5000 INJECTION INTRAVENOUS; SUBCUTANEOUS at 13:30

## 2020-03-29 RX ADMIN — BUDESONIDE AND FORMOTEROL FUMARATE DIHYDRATE 2 PUFF(S): 160; 4.5 AEROSOL RESPIRATORY (INHALATION) at 21:56

## 2020-03-29 RX ADMIN — Medication 40 MILLIGRAM(S): at 15:17

## 2020-03-29 RX ADMIN — Medication 3 MILLILITER(S): at 10:56

## 2020-03-29 RX ADMIN — Medication 650 MILLIGRAM(S): at 00:25

## 2020-03-29 RX ADMIN — HEPARIN SODIUM 5000 UNIT(S): 5000 INJECTION INTRAVENOUS; SUBCUTANEOUS at 06:42

## 2020-03-29 RX ADMIN — Medication 81 MILLIGRAM(S): at 13:01

## 2020-03-29 RX ADMIN — BUDESONIDE AND FORMOTEROL FUMARATE DIHYDRATE 2 PUFF(S): 160; 4.5 AEROSOL RESPIRATORY (INHALATION) at 10:00

## 2020-03-29 RX ADMIN — Medication 80 MILLIGRAM(S): at 16:02

## 2020-03-29 RX ADMIN — Medication 40 MILLIGRAM(S): at 08:14

## 2020-03-29 RX ADMIN — Medication 3 MILLILITER(S): at 21:58

## 2020-03-29 RX ADMIN — CALCITRIOL 0.5 MICROGRAM(S): 0.5 CAPSULE ORAL at 13:02

## 2020-03-29 RX ADMIN — Medication 650 MILLIGRAM(S): at 06:42

## 2020-03-29 RX ADMIN — Medication 650 MILLIGRAM(S): at 23:05

## 2020-03-29 RX ADMIN — Medication 1200 MILLIGRAM(S): at 15:17

## 2020-03-29 RX ADMIN — Medication 25 MILLIGRAM(S): at 17:37

## 2020-03-29 RX ADMIN — Medication 3 MILLILITER(S): at 17:04

## 2020-03-29 RX ADMIN — Medication 25 GRAM(S): at 20:16

## 2020-03-29 NOTE — CONSULT NOTE ADULT - SUBJECTIVE AND OBJECTIVE BOX
HPI:HPI:  87 yo M with PMHx of dementia, DM, HTN, depression, CKD, COPD presents to the ED with difficulty breathing and progressive confusion. History obtained via chart and ED staff as pt is unable to provide history and unable to reach daughter via phone at this time. ED staff spoke with daughter earlier who reports that pt had always had shortness of breath due to COPD but since Saturday it has gotten progressively worse with intermittent wheezing. In January, they found out that he has advanced prostate cancer. He has tried multiple medications, but on 3/12 he had an injection of Xgeva. Since then, he has been generally weak and not responding well. Starting on Thursday, he started to have hallucinations, decreased PO, and progressively weaker and unable to ambulate even with his walker. No reports of fevers, vomiting, diarrhea, chest pain or other complaints. No sick contacts or recent travel. (23 Mar 2020 18:23)  Pulm:  Pt has developed cough and wheezing today.      PAST MEDICAL & SURGICAL HISTORY:  History of prostate cancer  Dementia  chronic renal disease  Dyslipidemia  H/O: obesity  COPD (chronic obstructive pulmonary disease)  Depression  Hyperlipemia  H/O: hypertension  Diabetes mellitus  Hx of coronary angiogram  Inguinal hernia      FAMILY HISTORY:  No pertinent family history in first degree relatives      SOCIAL HISTORY:  Smoking: former    Recent Travel: no recent    Allergies    Aricept (Other)  Cipro (Unknown)  Demerol HCl (Unknown)    Intolerances        HOME  MEDICATIONS:Home Medications:  amLODIPine 5 mg oral tablet: 1 tab(s) orally once a day (18 May 2019 14:32)  aspirin 81 mg oral tablet, chewable: 1 tab(s) orally once a day (18 May 2019 14:32)  atorvastatin 10 mg oral tablet: 1 tab(s) orally once a day (18 May 2019 14:32)  buPROPion 100 mg oral tablet: 1 tab(s) orally 2 times a day (18 May 2019 14:33)  Bystolic 5 mg oral tablet: 1 tab(s) orally once a day (18 May 2019 14:36)  galantamine 4 mg oral tablet: 1 tab(s) orally 2 times a day (18 May 2019 14:33)  glimepiride 1 mg oral tablet: 1 tab(s) orally once a day (18 May 2019 14:38)  repaglinide 1 mg oral tablet: 1 tab(s) orally 3 times a day (before meals) (18 May 2019 14:34)  sertraline 100 mg oral tablet: 1 tab(s) orally once a day (18 May 2019 14:37)  sodium bicarbonate: 1300 milligram(s) orally 2 times a day (18 May 2019 14:36)      REVIEW OF SYSTEMS:  Constitutional: No fevers or chills. No weight loss. .  Eyes: No itching or discharge from the eyes  ENT: . No nasal congestion. No post nasal drip  CV: No chest pain. No palpitations. No lightheadedness or dizziness.   Resp: dyspnea and cough  GI: No nausea. No vomiting. No diarrhea.  MSK: No joint pain or pain in any extremities  Integumentary: No skin lesions. No pedal edema.  Neurological: No gross motor weakness. No sensory changes.      OBJECTIVE:  ICU Vital Signs Last 24 Hrs  T(C): 37.9 (29 Mar 2020 13:00), Max: 37.9 (29 Mar 2020 13:00)  T(F): 100.2 (29 Mar 2020 13:00), Max: 100.2 (29 Mar 2020 13:00)  HR: 102 (29 Mar 2020 13:00) (80 - 102)  BP: 158/83 (29 Mar 2020 13:00) (123/72 - 158/83)  BP(mean): --  ABP: --  ABP(mean): --  RR: 17 (29 Mar 2020 13:00) (16 - 17)  SpO2: 95% (29 Mar 2020 13:00) (95% - 99%)        03-28 @ 07:01  -  03-29 @ 07:00  --------------------------------------------------------  IN: 200 mL / OUT: 0 mL / NET: 200 mL      CAPILLARY BLOOD GLUCOSE      POCT Blood Glucose.: 117 mg/dL (29 Mar 2020 11:39)      PHYSICAL EXAM:  General: Awake, alert, oriented X 3.   HEENT: Atraumatic, normocephalic.                            .  Lymph Nodes: No palpable lymphadenopathy  Neck: No JVD. No carotid bruit.   Respiratory: moderate wheezing bilat.  Cardiovascular: S1 S2 normal. No murmurs, rubs or gallops.   Abdomen: Soft, non-tender,   Extremities: Warm to touch.    Skin: No rashes or skin lesions  Neurological: Motor and sensory examination equal and normal in all four extremities.  Psychiatry: mildly confused    HOSPITAL MEDICATIONS:  MEDICATIONS  (STANDING):  acetylcysteine 20%  Inhalation 4 milliLiter(s) Inhalation three times a day  albuterol/ipratropium for Nebulization 3 milliLiter(s) Nebulizer every 6 hours  aspirin enteric coated 81 milliGRAM(s) Oral daily  atorvastatin 10 milliGRAM(s) Oral at bedtime  budesonide 160 MICROgram(s)/formoterol 4.5 MICROgram(s) Inhaler 2 Puff(s) Inhalation two times a day  buPROPion XL . 150 milliGRAM(s) Oral daily  calcitriol   Capsule 0.5 MICROGram(s) Oral daily  calcium acetate 1334 milliGRAM(s) Oral three times a day with meals  calcium gluconate IVPB 2 Gram(s) IV Intermittent every 4 hours  dextrose 5%. 1000 milliLiter(s) (50 mL/Hr) IV Continuous <Continuous>  dextrose 50% Injectable 12.5 Gram(s) IV Push once  dextrose 50% Injectable 25 Gram(s) IV Push once  dextrose 50% Injectable 25 Gram(s) IV Push once  heparin  Injectable 5000 Unit(s) SubCutaneous every 8 hours  insulin lispro (HumaLOG) corrective regimen sliding scale   SubCutaneous three times a day before meals  insulin lispro (HumaLOG) corrective regimen sliding scale   SubCutaneous at bedtime  LORazepam     Tablet 0.25 milliGRAM(s) Oral every 8 hours  methylPREDNISolone sodium succinate Injectable 40 milliGRAM(s) IV Push every 8 hours  metoprolol tartrate 25 milliGRAM(s) Oral two times a day  sertraline 100 milliGRAM(s) Oral daily  sodium bicarbonate 650 milliGRAM(s) Oral four times a day    MEDICATIONS  (PRN):  acetaminophen   Tablet .. 650 milliGRAM(s) Oral every 4 hours PRN Mild Pain (1 - 3)  dextrose 40% Gel 15 Gram(s) Oral once PRN Blood Glucose LESS THAN 70 milliGRAM(s)/deciliter  glucagon  Injectable 1 milliGRAM(s) IntraMuscular once PRN Glucose LESS THAN 70 milligrams/deciliter      LABS:                        7.4    9.94  )-----------( 220      ( 28 Mar 2020 05:49 )             23.2     03-29    136  |  101  |  64<H>  ----------------------------<  116<H>  4.3   |  17<L>  |  3.12<H>    Ca    6.2<LL>      29 Mar 2020 06:54  Phos  2.8     03-28  Mg     1.7     03-28    TPro  5.6<L>  /  Alb  1.9<L>  /  TBili  0.3  /  DBili  x   /  AST  34  /  ALT  26  /  AlkPhos  137<H>  03-28              MICROBIOLOGY:     RADIOLOGY:  [ ] Reviewed and interpreted by me     EKG:

## 2020-03-29 NOTE — PROGRESS NOTE ADULT - ASSESSMENT
87 yo M with PMHx of dementia, DM, HTN, depression, CKD4, COPD presents to the ED with difficulty breathing and progressive confusion.    3/27: Spoke with patient's daughter Lily to update on patient status and plan of care    #SOB likely 2/2 metabolic acidosis   - IMPROVED   - Afebrile, WBCs normal, blood cultures negative, COVID-19 negative  - CXR: patchy infiltrates in RLL; will continue to monitor off abx for now; procalcitonin 0.86  - Continue albuterol q6, symbicort  - ativan 0.25mg q8 prn will add non rebreather to maintain saturations   - lasix 40mg IVP this am   - portable CXR  pending    #Metabolic encephalopathy: 2/2 metabolic acidosis/multiple electrolytes abnormalities likely 2/2 Xgeva (denosumab) and PABLITO on CKD.   - IMPROVED  - Continue to correct electrolytes  - trend BMP  - aspiration precautions    #PABLITO (Cr 2.8 in May 2019)  on CKD stage 4 and high anionic gap metabolic acidosis:  - Cr slowly improving   - Renal following  - hold nephrotoxic meds  - sodium bicarb tablets   - trend BMP    #anemia   possible transfusion H/H 7.4 /23    #Hypocalcemia likely 2/2 Denosumab vs secondary hyperparathyroidism   - prolonged QTc on ECG (566) on admission - improving ( on 3/25)  - continue rocaltrol, phoslo   - Calcium gluconate IV as per renal  - renal imput appreciated   - monitor calcium levels     #Hypomagnesemia and hyperkalemia   - Resolved  - Continue to monitor BMP    # Urinary retention  - voiding trial--monitor output- PVR now   - continue flomax    #HTN:  - continue amlodipine  - BBlocker held on admission due to hyperkalemia - had been on bystolic 5mg daily. BP and HR have been acceptable off bystolic, will restart if needed    #Depression:  - continue Sertraline and buproprion (home dose buproprion 100mg BID, pharmacy here only has 150mg XL)    #DVT ppx:  - HSQ    #Goals of care:  - DNR/DNI 87 yo M with PMHx of dementia, DM, HTN, depression, CKD4, COPD presents to the ED with difficulty breathing and progressive confusion.  long talk with daughter updating her on pts condition and goals of care pt is a dnr with no intubation or extra ordinary measures will get pallative consult     3/27: Spoke with patient's daughter Lily to update on patient status and plan of care    #SOB likely 2/2 metabolic acidosis   - IMPROVED   - Afebrile, WBCs normal, blood cultures negative, COVID-19 negative  - CXR: patchy infiltrates in RLL; will continue to monitor off abx for now; procalcitonin 0.86  - Continue albuterol q6, symbicort  - ativan 0.25mg q8 prn will add non rebreather to maintain saturations   - lasix 40mg IVP this am   - portable CXR  pending    #Metabolic encephalopathy: 2/2 metabolic acidosis/multiple electrolytes abnormalities likely 2/2 Xgeva (denosumab) and PABLITO on CKD.   - IMPROVED  - Continue to correct electrolytes  - trend BMP  - aspiration precautions    #PABLITO (Cr 2.8 in May 2019)  on CKD stage 4 and high anionic gap metabolic acidosis:  - Cr slowly improving   - Renal following  - hold nephrotoxic meds  - sodium bicarb tablets   - trend BMP    #anemia   possible transfusion H/H 7.4 /23    #Hypocalcemia likely 2/2 Denosumab vs secondary hyperparathyroidism   - prolonged QTc on ECG (566) on admission - improving ( on 3/25)  - continue rocaltrol, phoslo   - Calcium gluconate IV as per renal  - renal imput appreciated   - monitor calcium levels     #Hypomagnesemia and hyperkalemia   - Resolved  - Continue to monitor BMP    # Urinary retention  - voiding trial--monitor output- PVR now   - continue flomax    #HTN:  - continue amlodipine  - BBlocker held on admission due to hyperkalemia - had been on bystolic 5mg daily. BP and HR have been acceptable off bystolic, will restart if needed    #Depression:  - continue Sertraline and buproprion (home dose buproprion 100mg BID, pharmacy here only has 150mg XL)    #DVT ppx:  - HSQ    #Goals of care:  - DNR/DNI

## 2020-03-29 NOTE — CONSULT NOTE ADULT - ASSESSMENT
85 yo M with PMHx of dementia, DM, HTN, depression, CKD4, COPD presents to the ED with difficulty breathing and progressive confusion.  long talk with daughter updating her on pts condition and goals of care pt is a dnr with no intubation or extra ordinary measureses.    3/27: Spoke with patient's daughter Lily to update on patient status and plan of care    #SOB likely 2/2 metabolic acidosis   - IMPROVED   - Afebrile, WBCs normal, blood cultures negative, COVID-19 negative  - CXR: clear  - Continue albuterol q6, symbicort  - ativan 0.25mg q8 prn will add non rebreather to maintain saturations   - lasix 40mg IVP this am   Pulm:  more wheezing c/w copd exacerbation.  Steroids added.    #Metabolic encephalopathy: 2/2 metabolic acidosis/multiple electrolytes abnormalities likely 2/2 Xgeva (denosumab) and PABLITO on CKD.   - IMPROVED  - Continue to correct electrolytes  - trend BMP  - aspiration precautions    #PABLITO (Cr 2.8 in May 2019)  on CKD stage 4 and high anionic gap metabolic acidosis:  - Cr slowly improving   - Renal following  - hold nephrotoxic meds  - sodium bicarb tablets   - trend BMP    #anemia   possible transfusion H/H 7.4 /23    #Hypocalcemia likely 2/2 Denosumab vs secondary hyperparathyroidism   - prolonged QTc on ECG (566) on admission - improving ( on 3/25)  - continue rocaltrol, phoslo   - Calcium gluconate IV as per renal  - renal imput appreciated   - monitor calcium levels     #Hypomagnesemia and hyperkalemia   - Resolved  - Continue to monitor BMP    # Urinary retention  - voiding trial--monitor output- PVR now   - continue flomax    #HTN:  - continue amlodipine  - BBlocker held on admission due to hyperkalemia - had been on bystolic 5mg daily. BP and HR have been acceptable off bystolic, will restart if needed    #Depression:  - continue Sertraline and buproprion (home dose buproprion 100mg BID, pharmacy here only has 150mg XL)    #DVT ppx:  - HSQ    #Goals of care:  - DNR/DNI
Assessment and Plan:   · Assessment		  85 yo M with PMHx of dementia, DM, HTN, depression, CKD4, COPD presented to the ED with difficulty breathing and progressive confusion.   patient's daughter Lily aware patients status and plan of care    #SOB   COPD-pulm following  nl LVEF, diast dysfunction  - Afebrile, WBCs normal, blood cultures negative, COVID-19 negative  - CXR report reviewed, will continue to monitor off abx for now; procalcitonin 0.86  - Continue albuterol q6, symbicort  - ativan 0.25mg q8 prn  pt feels better after lasix    #Metabolic encephalopathy: 2/2 metabolic acidosis/multiple electrolytes abnormalities likely 2/2 Xgeva (denosumab) and PABLITO on CKD.   - IMPROVED  - Continue to correct electrolytes  - trend BMP  - aspiration precautions    #PABLITO (Cr 2.8 in May 2019)  on CKD stage 4 and high anionic gap metabolic acidosis:  - Cr slowly improving   - Renal following  - hold nephrotoxic meds  - sodium bicarb tablets   - trend BMP    #anemia   possible transfusion H/H 7.4 /23    #Hypocalcemia likely 2/2 Denosumab vs secondary hyperparathyroidism   - prolonged QTc on ECG (566) on admission - improving ( on 3/25)  - continue rocaltrol, phoslo   - Calcium gluconate IV as per renal  - renal is following  - monitor calcium levels     #Hypomagnesemia and hyperkalemia   - Resolved  - Continue to monitor BMP    # Urinary retention  - voiding trial--monitor output- PVR now   - continue flomax    #HTN:  - continue amlodipine  - BBlocker held on admission due to hyperkalemia - had been on bystolic 5mg daily. BP and HR have been acceptable off bystolic, will restart if needed    #Depression:  - continue Sertraline and buproprion (home dose buproprion 100mg BID, pharmacy here only has 150mg XL)    #DVT ppx:  - HSQ    #Goals of care:  - DNR/DNI, conservative care    Case discussed with PA-Sheela Rosenberg
Assessment  1. Dyspnea suspect from metabolic Acidosis      with respiratory distress, pending respiratory failure   2. PABLITO on CKD 4 with metabolic acidosis, and hypocalcemia  3. Abnormal CXR suspect mets to bone  4. R/O COVID  5. Underlying COPD (doubt exacerbation), Dementia, High Chol, DM2, HTN        Plan  Give bicarb bolus and start drip as acidosis is worsening   will help resp distress  Trend labs  Dilaudid PRN to be considered  Renal f/u  F/u COVID labs  can d/c steroids as doubt acute exac.   MDI as needed (pending COVID results)       Case d/w With BG Bautista [phone] - Reviewed case and plan to make DNR/I  but wants to come see patient prior to making decision, requests to hold of CPR/Intubation till coming in

## 2020-03-29 NOTE — CONSULT NOTE ADULT - SUBJECTIVE AND OBJECTIVE BOX
Chief Complaint: presented with resp distress     HPI:  · Subjective and Objective: 	  Patient is a 86y old  Male who presents with a chief complaint of Confusion, dyspnea.  Pt continues with dyspnea this am .  pt just finished resp. treatment this am     PMH:   History of prostate cancer  Dementia  chronic renal disease  Dyslipidemia  H/O: obesity  COPD (chronic obstructive pulmonary disease)  Depression  Chronic renal failure  Hyperlipemia  H/O: hypertension  Diabetes mellitus    PSH:   Hx of coronary angiogram  Inguinal hernia  Diabetes mellitus    Family History:  FAMILY HISTORY:  No pertinent family history in first degree relatives      Social History:  Smoking:  Alcohol:  Drugs:    Allergies:  Aricept (Other)  Cipro (Unknown)  Demerol HCl (Unknown)      Medications:  acetaminophen   Tablet .. 650 milliGRAM(s) Oral every 4 hours PRN  albuterol/ipratropium for Nebulization 3 milliLiter(s) Nebulizer every 6 hours  aspirin enteric coated 81 milliGRAM(s) Oral daily  atorvastatin 10 milliGRAM(s) Oral at bedtime  budesonide 160 MICROgram(s)/formoterol 4.5 MICROgram(s) Inhaler 2 Puff(s) Inhalation two times a day  buPROPion XL . 150 milliGRAM(s) Oral daily  calcitriol   Capsule 0.5 MICROGram(s) Oral daily  calcium acetate 1334 milliGRAM(s) Oral three times a day with meals  calcium gluconate IVPB 2 Gram(s) IV Intermittent every 4 hours  dextrose 40% Gel 15 Gram(s) Oral once PRN  dextrose 5%. 1000 milliLiter(s) IV Continuous <Continuous>  dextrose 50% Injectable 12.5 Gram(s) IV Push once  dextrose 50% Injectable 25 Gram(s) IV Push once  dextrose 50% Injectable 25 Gram(s) IV Push once  glucagon  Injectable 1 milliGRAM(s) IntraMuscular once PRN  heparin  Injectable 5000 Unit(s) SubCutaneous every 8 hours  insulin lispro (HumaLOG) corrective regimen sliding scale   SubCutaneous three times a day before meals  insulin lispro (HumaLOG) corrective regimen sliding scale   SubCutaneous at bedtime  LORazepam     Tablet 0.25 milliGRAM(s) Oral every 8 hours  metoprolol tartrate 25 milliGRAM(s) Oral two times a day  sertraline 100 milliGRAM(s) Oral daily  sodium bicarbonate 650 milliGRAM(s) Oral four times a day      REVIEW OF SYSTEMS:  CONSTITUTIONAL: No fever, weight loss, or fatigue  EYES: No eye pain, visual disturbances, or discharge  ENMT:  No difficulty hearing, tinnitus, vertigo; No sinus or throat pain  NECK: No pain or stiffness  BREASTS: No pain, masses, or nipple discharge  RESPIRATORY: No cough, wheezing, chills or hemoptysis; No shortness of breath  CARDIOVASCULAR: No chest pain, palpitations, dizziness, or leg swelling  GASTROINTESTINAL: No abdominal or epigastric pain. No nausea, vomiting, or hematemesis; No diarrhea or constipation. No melena or hematochezia.  GENITOURINARY: No dysuria, frequency, hematuria, or incontinence  NEUROLOGICAL: No headaches, memory loss, loss of strength, numbness, or tremors  SKIN: No itching, burning, rashes, or lesions   LYMPH NODES: No enlarged glands  ENDOCRINE: No heat or cold intolerance; No hair loss  MUSCULOSKELETAL: No joint pain or swelling; No muscle, back, or extremity pain  PSYCHIATRIC: No depression, anxiety, mood swings, or difficulty sleeping  HEME/LYMPH: No easy bruising, or bleeding gums  ALLERY AND IMMUNOLOGIC: No hives or eczema    Physical Exam:  T(C): 36.5 (03-29-20 @ 07:15), Max: 36.9 (03-28-20 @ 21:20)  HR: 97 (03-29-20 @ 07:15) (80 - 98)  BP: 141/58 (03-29-20 @ 07:15) (123/72 - 145/63)  RR: 16 (03-29-20 @ 07:15) (16 - 17)  SpO2: 99% (03-29-20 @ 07:15) (95% - 99%)  Wt(kg): --    Medical Physical Exam  GENERAL: NAD, well-groomed, well-developed  HEAD:  Atraumatic, Normocephalic  EYES: EOMI, conjunctiva and sclera clear  ENT: Moist mucous membranes,  NECK: Supple, No JVD, no bruits  CHEST/LUNG: Clear to percussion bilaterally; No rales, rhonchi, wheezing, or rubs  HEART: Regular rate and rhythm; No murmurs, rubs, or gallops PMI non displaced.  ABDOMEN: Soft, Nontender, Nondistended; Bowel sounds present  EXTREMITIES:  2+ Peripheral Pulses, No clubbing, cyanosis, or edema  SKIN: No rashes or lesions  NERVOUS SYSTEM:  Alert & Oriented X3, Good concentration; Motor Strength 5/5 B/L upper and lower extremities; DTRs 2+ intact and symmetric    Cardiovascular Diagnostic Testing:  ECG:  < from: 12 Lead ECG (03.25.20 @ 13:59) >  Diagnosis Line Sinus rhythm with premature supraventricular complexes and with frequent  ventricular premature complexes  Minimal voltage criteria for LVH, may be normal variant  ST and T wave abnormality, consider lateral ischemia  Artifact is present.   Prolonged QT  Abnormal ECG  When compared with ECG of 24-MAR-2020 15:36,  Significant changes have occurred    < end of copied text >    echo  < from: TTE Echo Complete w/o contrast w/ Doppler (03.24.20 @ 13:13) >  Summary:   1. Left ventricular ejection fraction, by visual estimation, is 55 to 60%.   2. Technically fair study.   3. Normal global left ventricular systolic function.   4. Spectral Doppler shows impaired relaxation pattern of left ventricular myocardial filling (Grade I diastolic dysfunction).   5. Mild thickening and calcification of the anterior and posterior mitral valve leaflets.   6.Sclerotic aortic valve with normal opening.   7. Estimated pulmonary artery systolic pressure is 37.5 mmHg assuming a right atrial pressure of 10 mmHg, which is consistent with borderline pulmonary hypertension.   8. LA volume Index is 33.6 ml/m² ml/m2.    [id number] Shiraz Zapata , Electronically signed on 3/24/2020 at 4:29:24 PM            < end of copied text >    Labs:                        7.4    9.94  )-----------( 220      ( 28 Mar 2020 05:49 )             23.2     03-29    136  |  101  |  64<H>  ----------------------------<  116<H>  4.3   |  17<L>  |  3.12<H>    Ca    6.2<LL>      29 Mar 2020 06:54  Phos  2.8     03-28  Mg     1.7     03-28    TPro  5.6<L>  /  Alb  1.9<L>  /  TBili  0.3  /  DBili  x   /  AST  34  /  ALT  26  /  AlkPhos  137<H>  03-28              Hemoglobin A1C, Whole Blood: 5.6 % (03-24 @ 07:30)    Thyroid Stimulating Hormone, Serum: 1.01 uIU/mL (03-24 @ 07:30)      Imaging:  < from: Xray Chest 1 View-PORTABLE IMMEDIATE (03.29.20 @ 10:58) >  EXAM:  XR CHEST PORTABLE IMMED 1V      PROCEDURE DATE:  03/29/2020        INTERPRETATION:  History: Shortness of breath, prostate cancer    Portable comparison is made to 3/24/2020    Low lung volumes are again seen. There is linear atelectasis at the right base. There is suggestion of linear atelectasis at the small left pleural effusion. Bony structures demonstrate scattered sclerotic metastatic lesions.    Impression: Bibasilar linear atelectasis and questionable left pleural effusion  Sclerotic bony metastases      < end of copied text >      ASSESMENT AND PLAN:

## 2020-03-29 NOTE — PROVIDER CONTACT NOTE (CRITICAL VALUE NOTIFICATION) - ACTION/TREATMENT ORDERED:
Give med per order.
IV calcium ordered
continue calcium gluconate IV
MD made aware, will supplement calcium, pt safety maintained, will continue to monitor

## 2020-03-29 NOTE — PROGRESS NOTE ADULT - SUBJECTIVE AND OBJECTIVE BOX
[facility] NEPHROLOGY SERVICES, [facility]  NEPHROLOGY AND HYPERTENSION  [address]  [address]  [place] [address]  [phone]    MD JESENIA HAWLEY MD ANDREY GONCHARUK, MD MADHU KORRAPATI, MD YELENA ROSENBERG, MD BINNY KOSHY, MD CHRISTOPHER CAPUTO, MD EDWARD BOVER, MD          Patient in no acute distress    MEDICATIONS  (STANDING):  acetylcysteine 20%  Inhalation 4 milliLiter(s) Inhalation three times a day  albuterol/ipratropium for Nebulization 3 milliLiter(s) Nebulizer every 6 hours  aspirin enteric coated 81 milliGRAM(s) Oral daily  atorvastatin 10 milliGRAM(s) Oral at bedtime  budesonide 160 MICROgram(s)/formoterol 4.5 MICROgram(s) Inhaler 2 Puff(s) Inhalation two times a day  buPROPion XL . 150 milliGRAM(s) Oral daily  calcitriol   Capsule 0.5 MICROGram(s) Oral daily  calcium acetate 1334 milliGRAM(s) Oral three times a day with meals  calcium gluconate IVPB 2 Gram(s) IV Intermittent every 4 hours  dextrose 5%. 1000 milliLiter(s) (50 mL/Hr) IV Continuous <Continuous>  dextrose 50% Injectable 12.5 Gram(s) IV Push once  dextrose 50% Injectable 25 Gram(s) IV Push once  dextrose 50% Injectable 25 Gram(s) IV Push once  heparin  Injectable 5000 Unit(s) SubCutaneous every 8 hours  insulin lispro (HumaLOG) corrective regimen sliding scale   SubCutaneous three times a day before meals  insulin lispro (HumaLOG) corrective regimen sliding scale   SubCutaneous at bedtime  LORazepam     Tablet 0.25 milliGRAM(s) Oral every 8 hours  methylPREDNISolone sodium succinate Injectable 40 milliGRAM(s) IV Push every 8 hours  metoprolol tartrate 25 milliGRAM(s) Oral two times a day  sertraline 100 milliGRAM(s) Oral daily  sodium bicarbonate 650 milliGRAM(s) Oral four times a day    MEDICATIONS  (PRN):  acetaminophen   Tablet .. 650 milliGRAM(s) Oral every 4 hours PRN Mild Pain (1 - 3)  dextrose 40% Gel 15 Gram(s) Oral once PRN Blood Glucose LESS THAN 70 milliGRAM(s)/deciliter  glucagon  Injectable 1 milliGRAM(s) IntraMuscular once PRN Glucose LESS THAN 70 milligrams/deciliter      03-28-20 @ 07:01  -  03-29-20 @ 07:00  --------------------------------------------------------  IN: 200 mL / OUT: 0 mL / NET: 200 mL    03-29-20 @ 07:01  -  03-29-20 @ 20:05  --------------------------------------------------------  IN: 100 mL / OUT: 0 mL / NET: 100 mL      PHYSICAL EXAM:      T(C): 36.7 (03-29-20 @ 16:53), Max: 37.9 (03-29-20 @ 13:00)  HR: 106 (03-29-20 @ 16:53) (84 - 106)  BP: 147/74 (03-29-20 @ 16:53) (141/58 - 158/83)  RR: 16 (03-29-20 @ 16:53) (16 - 17)  SpO2: 100% (03-29-20 @ 16:53) (95% - 100%)  Wt(kg): --  Respiratory: clear anteriorly, decreased BS at bases  Cardiovascular: S1 S2  Gastrointestinal: soft NT ND +BS  Extremities:   1 edema                                    7.4    9.94  )-----------( 220      ( 28 Mar 2020 05:49 )             23.2     03-29    136  |  101  |  64<H>  ----------------------------<  116<H>  4.3   |  17<L>  |  3.12<H>    Ca    6.2<LL> corrected 8.2      29 Mar 2020 06:54  Phos  2.8     03-28  Mg     1.7     03-28    TPro  5.6<L>  /  Alb  1.9<L>  /  TBili  0.3  /  DBili  x   /  AST  34  /  ALT  26  /  AlkPhos  137<H>  03-28      LIVER FUNCTIONS - ( 28 Mar 2020 05:49 )  Alb: 1.9 g/dL / Pro: 5.6 g/dL / ALK PHOS: 137 U/L / ALT: 26 U/L / AST: 34 U/L / GGT: x           Creatinine Trend: 3.12<--, 3.10<--, 3.13<--, 3.31<--, 3.29<--, 3.19<--      A/P:    Hx COPD, CKD 4 (Cr 2.8 - 5/18/19), metastatic prostate ca  COPD exacerbation, possibly lung mets (COVID negative)  Severe hypocalcemia w/QT prolongation due to Denosumab (got on 3/12) exacerbated by bone disorder of advanced CKD w/low PTH and hyperphosphatemia, intermittent hypomagnesemia  Phoslo 2tabs PO TID  Calcitriol PO  IV Ca Gluconate 2g over 4 hrs X3  Suppl Mg as needed  F/u Ca, iCa level, CMP, Mg  Would avoid Denosumab in the future given CKD 4  Will follow          Louis Deleon MD

## 2020-03-30 LAB
ANION GAP SERPL CALC-SCNC: 17 MMOL/L — SIGNIFICANT CHANGE UP (ref 5–17)
BUN SERPL-MCNC: 74 MG/DL — HIGH (ref 7–23)
CA-I BLD-SCNC: 0.95 MMOL/L — LOW (ref 1.12–1.3)
CALCIUM SERPL-MCNC: 6.7 MG/DL — LOW (ref 8.4–10.5)
CHLORIDE SERPL-SCNC: 98 MMOL/L — SIGNIFICANT CHANGE UP (ref 96–108)
CO2 SERPL-SCNC: 22 MMOL/L — SIGNIFICANT CHANGE UP (ref 22–31)
CREAT SERPL-MCNC: 3.41 MG/DL — HIGH (ref 0.5–1.3)
GLUCOSE BLDC GLUCOMTR-MCNC: 167 MG/DL — HIGH (ref 70–99)
GLUCOSE BLDC GLUCOMTR-MCNC: 171 MG/DL — HIGH (ref 70–99)
GLUCOSE BLDC GLUCOMTR-MCNC: 242 MG/DL — HIGH (ref 70–99)
GLUCOSE BLDC GLUCOMTR-MCNC: 288 MG/DL — HIGH (ref 70–99)
GLUCOSE SERPL-MCNC: 261 MG/DL — HIGH (ref 70–99)
HCT VFR BLD CALC: 25.1 % — LOW (ref 39–50)
HGB BLD-MCNC: 7.8 G/DL — LOW (ref 13–17)
MAGNESIUM SERPL-MCNC: 1.7 MG/DL — SIGNIFICANT CHANGE UP (ref 1.6–2.6)
MCHC RBC-ENTMCNC: 28.6 PG — SIGNIFICANT CHANGE UP (ref 27–34)
MCHC RBC-ENTMCNC: 31.1 GM/DL — LOW (ref 32–36)
MCV RBC AUTO: 91.9 FL — SIGNIFICANT CHANGE UP (ref 80–100)
NRBC # BLD: 0 /100 WBCS — SIGNIFICANT CHANGE UP (ref 0–0)
PLATELET # BLD AUTO: 250 K/UL — SIGNIFICANT CHANGE UP (ref 150–400)
POTASSIUM SERPL-MCNC: 4.8 MMOL/L — SIGNIFICANT CHANGE UP (ref 3.5–5.3)
POTASSIUM SERPL-SCNC: 4.8 MMOL/L — SIGNIFICANT CHANGE UP (ref 3.5–5.3)
RBC # BLD: 2.73 M/UL — LOW (ref 4.2–5.8)
RBC # FLD: 14.7 % — HIGH (ref 10.3–14.5)
SODIUM SERPL-SCNC: 137 MMOL/L — SIGNIFICANT CHANGE UP (ref 135–145)
WBC # BLD: 10.98 K/UL — HIGH (ref 3.8–10.5)
WBC # FLD AUTO: 10.98 K/UL — HIGH (ref 3.8–10.5)

## 2020-03-30 PROCEDURE — 99232 SBSQ HOSP IP/OBS MODERATE 35: CPT

## 2020-03-30 PROCEDURE — 93010 ELECTROCARDIOGRAM REPORT: CPT

## 2020-03-30 PROCEDURE — 99233 SBSQ HOSP IP/OBS HIGH 50: CPT

## 2020-03-30 RX ORDER — MONTELUKAST 4 MG/1
10 TABLET, CHEWABLE ORAL DAILY
Refills: 0 | Status: DISCONTINUED | OUTPATIENT
Start: 2020-03-30 | End: 2020-04-10

## 2020-03-30 RX ORDER — SODIUM CHLORIDE 9 MG/ML
1000 INJECTION INTRAMUSCULAR; INTRAVENOUS; SUBCUTANEOUS
Refills: 0 | Status: DISCONTINUED | OUTPATIENT
Start: 2020-03-30 | End: 2020-03-30

## 2020-03-30 RX ADMIN — Medication 3 MILLILITER(S): at 04:53

## 2020-03-30 RX ADMIN — CALCITRIOL 0.5 MICROGRAM(S): 0.5 CAPSULE ORAL at 11:08

## 2020-03-30 RX ADMIN — BUPROPION HYDROCHLORIDE 150 MILLIGRAM(S): 150 TABLET, EXTENDED RELEASE ORAL at 11:08

## 2020-03-30 RX ADMIN — Medication 25 MILLIGRAM(S): at 17:22

## 2020-03-30 RX ADMIN — Medication 3 MILLILITER(S): at 22:26

## 2020-03-30 RX ADMIN — Medication 650 MILLIGRAM(S): at 23:20

## 2020-03-30 RX ADMIN — Medication 3 MILLILITER(S): at 16:44

## 2020-03-30 RX ADMIN — Medication 81 MILLIGRAM(S): at 11:08

## 2020-03-30 RX ADMIN — Medication 3 MILLILITER(S): at 10:05

## 2020-03-30 RX ADMIN — Medication 1334 MILLIGRAM(S): at 07:54

## 2020-03-30 RX ADMIN — Medication 650 MILLIGRAM(S): at 11:08

## 2020-03-30 RX ADMIN — Medication 60 MILLIGRAM(S): at 23:20

## 2020-03-30 RX ADMIN — Medication 3: at 07:53

## 2020-03-30 RX ADMIN — Medication 650 MILLIGRAM(S): at 17:22

## 2020-03-30 RX ADMIN — HEPARIN SODIUM 5000 UNIT(S): 5000 INJECTION INTRAVENOUS; SUBCUTANEOUS at 14:30

## 2020-03-30 RX ADMIN — HEPARIN SODIUM 5000 UNIT(S): 5000 INJECTION INTRAVENOUS; SUBCUTANEOUS at 05:05

## 2020-03-30 RX ADMIN — Medication 650 MILLIGRAM(S): at 05:06

## 2020-03-30 RX ADMIN — Medication 40 MILLIGRAM(S): at 05:05

## 2020-03-30 RX ADMIN — ATORVASTATIN CALCIUM 10 MILLIGRAM(S): 80 TABLET, FILM COATED ORAL at 21:41

## 2020-03-30 RX ADMIN — Medication 4 MILLILITER(S): at 16:55

## 2020-03-30 RX ADMIN — MONTELUKAST 10 MILLIGRAM(S): 4 TABLET, CHEWABLE ORAL at 17:25

## 2020-03-30 RX ADMIN — Medication 1: at 17:21

## 2020-03-30 RX ADMIN — BUDESONIDE AND FORMOTEROL FUMARATE DIHYDRATE 2 PUFF(S): 160; 4.5 AEROSOL RESPIRATORY (INHALATION) at 10:05

## 2020-03-30 RX ADMIN — HEPARIN SODIUM 5000 UNIT(S): 5000 INJECTION INTRAVENOUS; SUBCUTANEOUS at 21:41

## 2020-03-30 RX ADMIN — Medication 1334 MILLIGRAM(S): at 11:07

## 2020-03-30 RX ADMIN — Medication 2: at 11:37

## 2020-03-30 RX ADMIN — Medication 60 MILLIGRAM(S): at 17:24

## 2020-03-30 RX ADMIN — Medication 4 MILLILITER(S): at 10:04

## 2020-03-30 RX ADMIN — Medication 0.25 MILLIGRAM(S): at 05:06

## 2020-03-30 RX ADMIN — SERTRALINE 100 MILLIGRAM(S): 25 TABLET, FILM COATED ORAL at 11:09

## 2020-03-30 RX ADMIN — Medication 1334 MILLIGRAM(S): at 17:22

## 2020-03-30 RX ADMIN — BUDESONIDE AND FORMOTEROL FUMARATE DIHYDRATE 2 PUFF(S): 160; 4.5 AEROSOL RESPIRATORY (INHALATION) at 22:31

## 2020-03-30 RX ADMIN — Medication 25 MILLIGRAM(S): at 05:05

## 2020-03-30 NOTE — PROGRESS NOTE ADULT - SUBJECTIVE AND OBJECTIVE BOX
No distress    Vital Signs Last 24 Hrs  T(C): 36.8 (03-30-20 @ 17:16), Max: 37.5 (03-29-20 @ 20:56)  T(F): 98.2 (03-30-20 @ 17:16), Max: 99.5 (03-29-20 @ 20:56)  HR: 97 (03-30-20 @ 17:16) (73 - 101)  BP: 134/67 (03-30-20 @ 17:16) (133/62 - 141/71)  RR: 16 (03-30-20 @ 17:16) (16 - 17)  SpO2: 95% (03-30-20 @ 17:16) (95% - 100%)    Card S1S2  Lungs b/l air entry  Abd soft, NT, ND  Extr no edema                                                 7.8    10.98 )-----------( 250      ( 30 Mar 2020 06:00 )             25.1     30 Mar 2020 06:00    137    |  98     |  74     ----------------------------<  261    4.8     |  22     |  3.41     Ca    6.7        30 Mar 2020 06:00  Mg     1.7       30 Mar 2020 06:00    acetaminophen   Tablet .. 650 milliGRAM(s) Oral every 4 hours PRN  albuterol/ipratropium for Nebulization 3 milliLiter(s) Nebulizer every 6 hours  aspirin enteric coated 81 milliGRAM(s) Oral daily  atorvastatin 10 milliGRAM(s) Oral at bedtime  budesonide 160 MICROgram(s)/formoterol 4.5 MICROgram(s) Inhaler 2 Puff(s) Inhalation two times a day  buPROPion XL . 150 milliGRAM(s) Oral daily  calcitriol   Capsule 0.5 MICROGram(s) Oral daily  calcium acetate 1334 milliGRAM(s) Oral three times a day with meals  dextrose 40% Gel 15 Gram(s) Oral once PRN  dextrose 5%. 1000 milliLiter(s) IV Continuous <Continuous>  dextrose 50% Injectable 12.5 Gram(s) IV Push once  dextrose 50% Injectable 25 Gram(s) IV Push once  dextrose 50% Injectable 25 Gram(s) IV Push once  glucagon  Injectable 1 milliGRAM(s) IntraMuscular once PRN  heparin  Injectable 5000 Unit(s) SubCutaneous every 8 hours  insulin lispro (HumaLOG) corrective regimen sliding scale   SubCutaneous three times a day before meals  insulin lispro (HumaLOG) corrective regimen sliding scale   SubCutaneous at bedtime  methylPREDNISolone sodium succinate Injectable 60 milliGRAM(s) IV Push every 6 hours  metoprolol tartrate 25 milliGRAM(s) Oral two times a day  montelukast 10 milliGRAM(s) Oral daily  sertraline 100 milliGRAM(s) Oral daily  sodium bicarbonate 650 milliGRAM(s) Oral four times a day    A/P:    Hx COPD, CKD 4 (Cr 2.8 - 5/18/19), metastatic prostate ca  COPD exacerbation, possibly lung mets (COVID negative)  Severe hypocalcemia w/QT prolongation due to Denosumab (got on 3/12) exacerbated by bone disorder of advanced CKD w/low PTH and hyperphosphatemia, intermittent hypomagnesemia  Phoslo 2 tabs PO TID  Calcitriol PO  IV Ca Gluconate as needed  Suppl Mg as needed  F/u Ca, iCa level, CMP, Mg  Gentle IVF  Would avoid Denosumab in the future given CKD 4  Will follow No distress    Vital Signs Last 24 Hrs  T(C): 36.8 (03-30-20 @ 17:16), Max: 37.5 (03-29-20 @ 20:56)  T(F): 98.2 (03-30-20 @ 17:16), Max: 99.5 (03-29-20 @ 20:56)  HR: 97 (03-30-20 @ 17:16) (73 - 101)  BP: 134/67 (03-30-20 @ 17:16) (133/62 - 141/71)  RR: 16 (03-30-20 @ 17:16) (16 - 17)  SpO2: 95% (03-30-20 @ 17:16) (95% - 100%)    Card S1S2  Lungs b/l air entry  Abd soft, NT, ND  Extr no edema                                                 7.8    10.98 )-----------( 250      ( 30 Mar 2020 06:00 )             25.1     30 Mar 2020 06:00    137    |  98     |  74     ----------------------------<  261    4.8     |  22     |  3.41     Ca    6.7        30 Mar 2020 06:00  Mg     1.7       30 Mar 2020 06:00    acetaminophen   Tablet .. 650 milliGRAM(s) Oral every 4 hours PRN  albuterol/ipratropium for Nebulization 3 milliLiter(s) Nebulizer every 6 hours  aspirin enteric coated 81 milliGRAM(s) Oral daily  atorvastatin 10 milliGRAM(s) Oral at bedtime  budesonide 160 MICROgram(s)/formoterol 4.5 MICROgram(s) Inhaler 2 Puff(s) Inhalation two times a day  buPROPion XL . 150 milliGRAM(s) Oral daily  calcitriol   Capsule 0.5 MICROGram(s) Oral daily  calcium acetate 1334 milliGRAM(s) Oral three times a day with meals  dextrose 40% Gel 15 Gram(s) Oral once PRN  dextrose 5%. 1000 milliLiter(s) IV Continuous <Continuous>  dextrose 50% Injectable 12.5 Gram(s) IV Push once  dextrose 50% Injectable 25 Gram(s) IV Push once  dextrose 50% Injectable 25 Gram(s) IV Push once  glucagon  Injectable 1 milliGRAM(s) IntraMuscular once PRN  heparin  Injectable 5000 Unit(s) SubCutaneous every 8 hours  insulin lispro (HumaLOG) corrective regimen sliding scale   SubCutaneous three times a day before meals  insulin lispro (HumaLOG) corrective regimen sliding scale   SubCutaneous at bedtime  methylPREDNISolone sodium succinate Injectable 60 milliGRAM(s) IV Push every 6 hours  metoprolol tartrate 25 milliGRAM(s) Oral two times a day  montelukast 10 milliGRAM(s) Oral daily  sertraline 100 milliGRAM(s) Oral daily  sodium bicarbonate 650 milliGRAM(s) Oral four times a day    A/P:    Hx COPD, CKD 4 (Cr 2.8 - 5/18/19), metastatic prostate ca  COPD exacerbation, possibly lung mets (COVID negative)  Severe hypocalcemia w/QT prolongation due to Denosumab (got on 3/12) exacerbated by bone disorder of advanced CKD w/low PTH and hyperphosphatemia, intermittent hypomagnesemia  Phoslo 2 tabs PO TID  Calcitriol PO  IV Ca Gluconate as needed  Suppl Mg as needed  F/u Ca, iCa level, CMP, Mg  Rising Cr post Lasix yesterday  Hold further diuretics  Avoid Denosumab in the future given CKD 4  Will follow

## 2020-03-30 NOTE — PROGRESS NOTE ADULT - SUBJECTIVE AND OBJECTIVE BOX
Follow up for dyspnea  SUBJ: patient c/o cough no cp nosob  PMH  History of prostate cancer  Dementia  chronic renal disease  Dyslipidemia  H/O: obesity  COPD (chronic obstructive pulmonary disease)  Depression  Chronic renal failure  Hyperlipemia  H/O: hypertension  Diabetes mellitus      MEDICATIONS  (STANDING):  acetylcysteine 20%  Inhalation 4 milliLiter(s) Inhalation three times a day  albuterol/ipratropium for Nebulization 3 milliLiter(s) Nebulizer every 6 hours  aspirin enteric coated 81 milliGRAM(s) Oral daily  atorvastatin 10 milliGRAM(s) Oral at bedtime  budesonide 160 MICROgram(s)/formoterol 4.5 MICROgram(s) Inhaler 2 Puff(s) Inhalation two times a day  buPROPion XL . 150 milliGRAM(s) Oral daily  calcitriol   Capsule 0.5 MICROGram(s) Oral daily  calcium acetate 1334 milliGRAM(s) Oral three times a day with meals  dextrose 5%. 1000 milliLiter(s) (50 mL/Hr) IV Continuous <Continuous>  dextrose 50% Injectable 12.5 Gram(s) IV Push once  dextrose 50% Injectable 25 Gram(s) IV Push once  dextrose 50% Injectable 25 Gram(s) IV Push once  heparin  Injectable 5000 Unit(s) SubCutaneous every 8 hours  insulin lispro (HumaLOG) corrective regimen sliding scale   SubCutaneous three times a day before meals  insulin lispro (HumaLOG) corrective regimen sliding scale   SubCutaneous at bedtime  LORazepam     Tablet 0.25 milliGRAM(s) Oral every 8 hours  methylPREDNISolone sodium succinate Injectable 60 milliGRAM(s) IV Push every 6 hours  metoprolol tartrate 25 milliGRAM(s) Oral two times a day  montelukast 10 milliGRAM(s) Oral daily  sertraline 100 milliGRAM(s) Oral daily  sodium bicarbonate 650 milliGRAM(s) Oral four times a day    MEDICATIONS  (PRN):  acetaminophen   Tablet .. 650 milliGRAM(s) Oral every 4 hours PRN Mild Pain (1 - 3)  dextrose 40% Gel 15 Gram(s) Oral once PRN Blood Glucose LESS THAN 70 milliGRAM(s)/deciliter  glucagon  Injectable 1 milliGRAM(s) IntraMuscular once PRN Glucose LESS THAN 70 milligrams/deciliter        PHYSICAL EXAM:  Vital Signs Last 24 Hrs  T(C): 37.5 (30 Mar 2020 08:27), Max: 37.5 (29 Mar 2020 20:56)  T(F): 99.5 (30 Mar 2020 08:27), Max: 99.5 (29 Mar 2020 20:56)  HR: 82 (30 Mar 2020 08:27) (73 - 106)  BP: 133/62 (30 Mar 2020 08:27) (133/62 - 147/74)  BP(mean): --  RR: 16 (30 Mar 2020 08:27) (16 - 17)  SpO2: 98% (30 Mar 2020 08:27) (95% - 100%)    GENERAL: NAD, well-groomed, well-developed  HEAD:  Atraumatic, Normocephalic  EYES: EOMI, PERRLA, conjunctiva and sclera clear  ENT: Moist mucous membranes,  NECK: Supple, No JVD, no bruits  CHEST/LUNG: Clear to percussion bilaterally; No rales, rhonchi, wheezing, or rubs  HEART: Regular rate and rhythm; No murmurs, rubs, or gallops PMI non displaced.  ABDOMEN: Soft, Nontender, Nondistended; Bowel sounds present  EXTREMITIES:  2+ Peripheral Pulses, No clubbing, cyanosis, or edema  SKIN: No rashes or lesions  NERVOUS SYSTEM:  Alert & Oriented X3, Good concentration; Motor Strength 5/5 B/L upper and lower extremities; DTRs 2+ intact and symmetric      TELEMETRY:rsr with apc's    ECG:    LABS:                        7.8    10.98 )-----------( 250      ( 30 Mar 2020 06:00 )             25.1     03-30    137  |  98  |  74<H>  ----------------------------<  261<H>  4.8   |  22  |  3.41<H>    Ca    6.7<L>      30 Mar 2020 06:00  Mg     1.7     03-30              I&O's Summary    29 Mar 2020 07:01  -  30 Mar 2020 07:00  --------------------------------------------------------  IN: 100 mL / OUT: 0 mL / NET: 100 mL      BNP    RADIOLOGY & ADDITIONAL STUDIES:    ECHO:

## 2020-03-30 NOTE — PROGRESS NOTE ADULT - ASSESSMENT
87 yo M with PMHx of dementia, DM, HTN, depression, CKD4, COPD presents to the ED with difficulty breathing and progressive confusion.  l  SOB likely 2/2 metabolic acidosis,  COPD exacerbation, Hx diastolic HF pEF  - Afebrile, COVID 19-negative  - CXR: patchy infiltrates in RLL; will continue to monitor off abx for now; procalcitonin 0.86  - Continue albuterol q6, symbicort  - ativan 0.25mg q8 prn will add non rebreather to maintain saturations   - received IV lasix x 1 dose yesterday- diuresed well per RN but no I/O recorded  -Pulm following  - CXR on 3/29 with possible L pleural effusion.   -Continue IV steroids Q 8 for now  -Lasix 40 x 1    Metabolic encephalopathy: 2/2 metabolic acidosis/multiple electrolytes abnormalities likely 2/2 Xgeva (denosumab) and PABLITO on CKD.   - Improving, bicarb improving  - Continue to correct electrolytes  - trend BMP  - aspiration precautions  -Renal following    PABLITO (Cr 2.8 in May 2019)  on CKD stage 4 and high anionic gap metabolic acidosis:  - Cr slowly improving   - Renal following  - hold nephrotoxic meds  - sodium bicarb tablets   - trend BMP    Normocytic anemia 2/2 CKD?  -Will trend for now  -Consider transfusion , currently hemodynamically stable    Hypocalcemia likely 2/2 Denosumab vs secondary hyperparathyroidism   - prolonged QTc on ECG (566) on admission - improving ( on 3/25)  - continue rocaltrol, phoslo   - Calcium gluconate IV as per renal  - renal input appreciated   - monitor calcium levels   -Recheck EKG today    Hypomagnesemia and hyperkalemia   - Resolved  - Continue to monitor BMP  -Will add Mg level to am labs    Urinary retention  - voiding now, can dc bladder scans- abdomen nondistended  - continue flomax    HTN  - continue amlodipine  - BBlocker held on admission due to hyperkalemia - restarted on 3/27. BP acceptable    Depression:  - continue Sertraline and buproprion (home dose buproprion 100mg BID, pharmacy here only has 150mg XL)    DVT ppx:  - HSQ    Goals of care:  - DNR/DNI    Pulm/Pall:  Daughter aware of situation.  Whereas part of his dyspnea may be related to his antineoplastic therapy.  Steroid dose increased.  Singulair added for antiallergy component.  PT eval

## 2020-03-30 NOTE — PROGRESS NOTE ADULT - SUBJECTIVE AND OBJECTIVE BOX
Follow-up Pall/pulm Progress Note    Rod Avalos MD  [phone]    remains dyspneic with cough.    Medications:  Vital Signs Last 24 Hrs  T(C): 37.5 (30 Mar 2020 08:27), Max: 37.5 (29 Mar 2020 20:56)  T(F): 99.5 (30 Mar 2020 08:27), Max: 99.5 (29 Mar 2020 20:56)  HR: 82 (30 Mar 2020 08:27) (73 - 106)  BP: 133/62 (30 Mar 2020 08:27) (133/62 - 147/74)  BP(mean): --  RR: 16 (30 Mar 2020 08:27) (16 - 17)  SpO2: 98% (30 Mar 2020 08:27) (95% - 100%)          03-29 @ 07:01  -  03-30 @ 07:00  --------------------------------------------------------  IN: 100 mL / OUT: 0 mL / NET: 100 mL        LABS:                        7.8    10.98 )-----------( 250      ( 30 Mar 2020 06:00 )             25.1     03-30    137  |  98  |  74<H>  ----------------------------<  261<H>  4.8   |  22  |  3.41<H>    Ca    6.7<L>      30 Mar 2020 06:00  Mg     1.7     03-30                CULTURES:  Culture Results:   No growth at 5 days. (03-23 @ 16:25)  Culture Results:   No growth at 5 days. (03-23 @ 16:25)  Culture Results:   No growth (03-23 @ 16:25)        Physical Examination:  PULM: scattered rales and rhonchi  CVS: Regular rate and rhythm, no murmurs, rubs, or gallops  ABD: Soft, non-tender  EXT:  No clubbing, cyanosis, or edema    RADIOLOGY REVIEWED  CXR:< from: Xray Chest 1 View-PORTABLE IMMEDIATE (03.29.20 @ 10:58) >    EXAM:  XR CHEST PORTABLE IMMED 1V      PROCEDURE DATE:  03/29/2020        INTERPRETATION:  History: Shortness of breath, prostate cancer    Portable comparison is made to 3/24/2020    Low lung volumes are again seen. There is linear atelectasis at the right base. There is suggestion of linear atelectasis at the small left pleural effusion. Bony structures demonstrate scattered sclerotic metastatic lesions.    Impression: Bibasilar linear atelectasis and questionable left pleural effusion  Sclerotic bony metastases                  SPIKE CEDEÑO M.D.,ATTENDING RADIOLOGIST  This document has been electronically signed. Mar 29 2020 11:16AM    < end of copied text >      CT chest:    TTE:

## 2020-03-30 NOTE — PROGRESS NOTE ADULT - ASSESSMENT
85 yo M with PMHx of dementia, DM, HTN, depression, CKD4, COPD presents to the ED with difficulty breathing and progressive confusion.  l  SOB likely 2/2 metabolic acidosis,  COPD exacerbation  - Afebrile, COVID 19-negative  - CXR: patchy infiltrates in RLL; will continue to monitor off abx for now; procalcitonin 0.86  - Continue albuterol q6, symbicort  - ativan 0.25mg q8 prn will add non rebreather to maintain saturations   - received IV lasix x 1 dose yesterday  - CXR on 3/29 with possible L pleural effusion.   -Continue IV steroids Q 8 for now    Metabolic encephalopathy: 2/2 metabolic acidosis/multiple electrolytes abnormalities likely 2/2 Xgeva (denosumab) and PABLITO on CKD.   - Improving  - Continue to correct electrolytes  - trend BMP  - aspiration precautions  -Renal following    PABLITO (Cr 2.8 in May 2019)  on CKD stage 4 and high anionic gap metabolic acidosis:  - Cr slowly improving   - Renal following  - hold nephrotoxic meds  - sodium bicarb tablets   - trend BMP    Anemia ? secondary to CKD  -    #Hypocalcemia likely 2/2 Denosumab vs secondary hyperparathyroidism   - prolonged QTc on ECG (566) on admission - improving ( on 3/25)  - continue rocaltrol, phoslo   - Calcium gluconate IV as per renal  - renal imput appreciated   - monitor calcium levels     #Hypomagnesemia and hyperkalemia   - Resolved  - Continue to monitor BMP    # Urinary retention  - voiding trial--monitor output- PVR now   - continue flomax    #HTN:  - continue amlodipine  - BBlocker held on admission due to hyperkalemia - had been on bystolic 5mg daily. BP and HR have been acceptable off bystolic, will restart if needed    #Depression:  - continue Sertraline and buproprion (home dose buproprion 100mg BID, pharmacy here only has 150mg XL)    #DVT ppx:  - HSQ    #Goals of care:  - DNR/DNI 85 yo M with PMHx of dementia, DM, HTN, depression, CKD4, COPD presents to the ED with difficulty breathing and progressive confusion.  l  SOB likely 2/2 metabolic acidosis,  COPD exacerbation  - Afebrile, COVID 19-negative  - CXR: patchy infiltrates in RLL; will continue to monitor off abx for now; procalcitonin 0.86  - Continue albuterol q6, symbicort  - ativan 0.25mg q8 prn will add non rebreather to maintain saturations   - received IV lasix x 1 dose yesterday  - CXR on 3/29 with possible L pleural effusion.   -Continue IV steroids Q 8 for now    Metabolic encephalopathy: 2/2 metabolic acidosis/multiple electrolytes abnormalities likely 2/2 Xgeva (denosumab) and PABLITO on CKD.   - Improving, bicarb improving  - Continue to correct electrolytes  - trend BMP  - aspiration precautions  -Renal following    PABLITO (Cr 2.8 in May 2019)  on CKD stage 4 and high anionic gap metabolic acidosis:  - Cr slowly improving   - Renal following  - hold nephrotoxic meds  - sodium bicarb tablets   - trend BMP    Normocytic anemia 2/2 CKD?  -Will trend for now  -Consider transfusion , currently hemodynamically stable    Hypocalcemia likely 2/2 Denosumab vs secondary hyperparathyroidism   - prolonged QTc on ECG (566) on admission - improving ( on 3/25)  - continue rocaltrol, phoslo   - Calcium gluconate IV as per renal  - renal input appreciated   - monitor calcium levels   -Recheck EKG today    Hypomagnesemia and hyperkalemia   - Resolved  - Continue to monitor BMP  -Will add Mg level to am labs    Urinary retention  - voiding trial--monitor output- PVR now   - continue flomax    HTN  - continue amlodipine  - BBlocker held on admission due to hyperkalemia - restarted on 3/27. BP acceptable    Depression:  - continue Sertraline and buproprion (home dose buproprion 100mg BID, pharmacy here only has 150mg XL)    DVT ppx:  - HSQ    Goals of care:  - DNR/DNI    PT eval 87 yo M with PMHx of dementia, DM, HTN, depression, CKD4, COPD presents to the ED with difficulty breathing and progressive confusion.  l  SOB likely 2/2 metabolic acidosis,  COPD exacerbation, Hx diastolic HF pEF  - Afebrile, COVID 19-negative  - CXR: patchy infiltrates in RLL; will continue to monitor off abx for now; procalcitonin 0.86  - Continue albuterol q6, symbicort  - ativan 0.25mg q8 prn will add non rebreather to maintain saturations   - received IV lasix x 1 dose yesterday- diuresed well per RN but no I/O recorded  -Pulm following  - CXR on 3/29 with possible L pleural effusion.   -Continue IV steroids Q 8 for now  -Lasix 40 x 1    Metabolic encephalopathy: 2/2 metabolic acidosis/multiple electrolytes abnormalities likely 2/2 Xgeva (denosumab) and PABLITO on CKD.   - Improving, bicarb improving  - Continue to correct electrolytes  - trend BMP  - aspiration precautions  -Renal following    PABLITO (Cr 2.8 in May 2019)  on CKD stage 4 and high anionic gap metabolic acidosis:  - Cr slowly improving   - Renal following  - hold nephrotoxic meds  - sodium bicarb tablets   - trend BMP    Normocytic anemia 2/2 CKD?  -Will trend for now  -Consider transfusion , currently hemodynamically stable    Hypocalcemia likely 2/2 Denosumab vs secondary hyperparathyroidism   - prolonged QTc on ECG (566) on admission - improving ( on 3/25)  - continue rocaltrol, phoslo   - Calcium gluconate IV as per renal  - renal input appreciated   - monitor calcium levels   -Recheck EKG today    Hypomagnesemia and hyperkalemia   - Resolved  - Continue to monitor BMP  -Will add Mg level to am labs    Urinary retention  - voiding trial--monitor output- PVR now   - continue flomax    HTN  - continue amlodipine  - BBlocker held on admission due to hyperkalemia - restarted on 3/27. BP acceptable    Depression:  - continue Sertraline and buproprion (home dose buproprion 100mg BID, pharmacy here only has 150mg XL)    DVT ppx:  - HSQ    Goals of care:  - DNR/DNI    Daughter Lily updated on status  PT eval 87 yo M with PMHx of dementia, DM, HTN, depression, CKD4, COPD presents to the ED with difficulty breathing and progressive confusion.  l  SOB likely 2/2 metabolic acidosis,  COPD exacerbation, Hx diastolic HF pEF  - Afebrile, COVID 19-negative  - CXR: patchy infiltrates in RLL; will continue to monitor off abx for now; procalcitonin 0.86  - Continue albuterol q6, symbicort  - ativan 0.25mg q8 prn will add non rebreather to maintain saturations   - received IV lasix x 1 dose yesterday- diuresed well per RN but no I/O recorded  -Pulm following  - CXR on 3/29 with possible L pleural effusion.   -Continue IV steroids Q 8 for now  -Lasix 40 x 1    Metabolic encephalopathy: 2/2 metabolic acidosis/multiple electrolytes abnormalities likely 2/2 Xgeva (denosumab) and PABLITO on CKD.   - Improving, bicarb improving  - Continue to correct electrolytes  - trend BMP  - aspiration precautions  -Renal following    PABLITO (Cr 2.8 in May 2019)  on CKD stage 4 and high anionic gap metabolic acidosis:  - Cr slowly improving   - Renal following  - hold nephrotoxic meds  - sodium bicarb tablets   - trend BMP    Normocytic anemia 2/2 CKD?  -Will trend for now  -Consider transfusion , currently hemodynamically stable    Hypocalcemia likely 2/2 Denosumab vs secondary hyperparathyroidism   - prolonged QTc on ECG (566) on admission - improving ( on 3/25)  - continue rocaltrol, phoslo   - Calcium gluconate IV as per renal  - renal input appreciated   - monitor calcium levels   -Recheck EKG today    Hypomagnesemia and hyperkalemia   - Resolved  - Continue to monitor BMP  -Will add Mg level to am labs    Urinary retention  - voiding now, can dc bladder scans- abdomen nondistended  - continue flomax    HTN  - continue amlodipine  - BBlocker held on admission due to hyperkalemia - restarted on 3/27. BP acceptable    Depression:  - continue Sertraline and buproprion (home dose buproprion 100mg BID, pharmacy here only has 150mg XL)    DVT ppx:  - HSQ    Goals of care:  - DNR/DNI    Daughter Lily updated on status  PT eval 87 yo M with PMHx of dementia, DM, HTN, depression, CKD4, COPD presents to the ED with difficulty breathing and progressive confusion.  l  SOB likely 2/2 metabolic acidosis,  COPD exacerbation, Hx diastolic HF pEF  - Afebrile, COVID 19-negative  - CXR: patchy infiltrates in RLL; will continue to monitor off abx for now; procalcitonin 0.86  - Continue albuterol q6, symbicort  - ativan 0.25mg q8 prn will add non rebreather to maintain saturations   - received IV lasix x 1 dose yesterday- diuresed well per RN but no I/O recorded  -Pulm following, will discuss further management  - CXR on 3/29 with possible L pleural effusion.   -Continue IV steroids Q 8 for now      Metabolic encephalopathy: 2/2 metabolic acidosis/multiple electrolytes abnormalities likely 2/2 Xgeva (denosumab) and PABLITO on CKD.   - Improving, bicarb improving  - Continue to correct electrolytes  - trend BMP  - aspiration precautions  -Renal following    PABLITO (Cr 2.8 in May 2019)  on CKD stage 4 and high anionic gap metabolic acidosis:  - Cr slowly improving   - Renal following  - hold nephrotoxic meds  - sodium bicarb tablets   - trend BMP    Normocytic anemia 2/2 CKD?  -Will trend for now  -Consider transfusion , currently hemodynamically stable    Hypocalcemia likely 2/2 Denosumab vs secondary hyperparathyroidism   - prolonged QTc on ECG (566) on admission - improving ( on 3/25)  - continue rocaltrol, phoslo   - Calcium gluconate IV as per renal  - renal input appreciated   - monitor calcium levels   -Recheck EKG today    Hypomagnesemia and hyperkalemia   - Resolved  - Continue to monitor BMP  -Will add Mg level to am labs    Urinary retention  - voiding now, can dc bladder scans- abdomen nondistended  - continue flomax    HTN  - continue amlodipine  - BBlocker held on admission due to hyperkalemia - restarted on 3/27. BP acceptable    Depression:  - continue Sertraline and buproprion (home dose buproprion 100mg BID, pharmacy here only has 150mg XL)    DVT ppx:  - HSQ    Goals of care:  - DNR/DNI    Daughter Lily updated on status  PT eval

## 2020-03-30 NOTE — PROGRESS NOTE ADULT - SUBJECTIVE AND OBJECTIVE BOX
Patient is a 86y old  Male who presents with a chief complaint of Confusion, dyspnea (29 Mar 2020 20:05)      Patient seen and examined at bedside. Still with cough.    ALLERGIES:  Aricept (Other)  Cipro (Unknown)  Demerol HCl (Unknown)    MEDICATIONS  (STANDING):  acetylcysteine 20%  Inhalation 4 milliLiter(s) Inhalation three times a day  albuterol/ipratropium for Nebulization 3 milliLiter(s) Nebulizer every 6 hours  aspirin enteric coated 81 milliGRAM(s) Oral daily  atorvastatin 10 milliGRAM(s) Oral at bedtime  budesonide 160 MICROgram(s)/formoterol 4.5 MICROgram(s) Inhaler 2 Puff(s) Inhalation two times a day  buPROPion XL . 150 milliGRAM(s) Oral daily  calcitriol   Capsule 0.5 MICROGram(s) Oral daily  calcium acetate 1334 milliGRAM(s) Oral three times a day with meals  dextrose 5%. 1000 milliLiter(s) (50 mL/Hr) IV Continuous <Continuous>  dextrose 50% Injectable 12.5 Gram(s) IV Push once  dextrose 50% Injectable 25 Gram(s) IV Push once  dextrose 50% Injectable 25 Gram(s) IV Push once  heparin  Injectable 5000 Unit(s) SubCutaneous every 8 hours  insulin lispro (HumaLOG) corrective regimen sliding scale   SubCutaneous three times a day before meals  insulin lispro (HumaLOG) corrective regimen sliding scale   SubCutaneous at bedtime  LORazepam     Tablet 0.25 milliGRAM(s) Oral every 8 hours  methylPREDNISolone sodium succinate Injectable 40 milliGRAM(s) IV Push every 8 hours  metoprolol tartrate 25 milliGRAM(s) Oral two times a day  sertraline 100 milliGRAM(s) Oral daily  sodium bicarbonate 650 milliGRAM(s) Oral four times a day    MEDICATIONS  (PRN):  acetaminophen   Tablet .. 650 milliGRAM(s) Oral every 4 hours PRN Mild Pain (1 - 3)  dextrose 40% Gel 15 Gram(s) Oral once PRN Blood Glucose LESS THAN 70 milliGRAM(s)/deciliter  glucagon  Injectable 1 milliGRAM(s) IntraMuscular once PRN Glucose LESS THAN 70 milligrams/deciliter    Vital Signs Last 24 Hrs  T(F): 99.5 (30 Mar 2020 08:27), Max: 100.2 (29 Mar 2020 13:00)  HR: 82 (30 Mar 2020 08:27) (73 - 106)  BP: 133/62 (30 Mar 2020 08:27) (133/62 - 158/83)  RR: 16 (30 Mar 2020 08:27) (16 - 17)  SpO2: 98% (30 Mar 2020 08:27) (95% - 100%)  I&O's Summary    29 Mar 2020 07:01  -  30 Mar 2020 07:00  --------------------------------------------------------  IN: 100 mL / OUT: 0 mL / NET: 100 mL      PHYSICAL EXAM:  General: NAD, A/O x 2  ENT: MMM, no thrush  Neck: Supple, No JVD  Lungs: Overall decreased BS with rales BL  Cardio: RRR, S1/S2, No murmur  Abdomen: Soft, Nontender, Nondistended; Bowel sounds present  Extremities: No calf tenderness, No pitting edema    LABS:                        7.8    10.98 )-----------( 250      ( 30 Mar 2020 06:00 )             25.1     03-30    137  |  98  |  74  ----------------------------<  261  4.8   |  22  |  3.41    Ca    6.7      30 Mar 2020 06:00  Phos  2.8     03-28  Mg     1.7     03-28    TPro  5.6  /  Alb  1.9  /  TBili  0.3  /  DBili  x   /  AST  34  /  ALT  26  /  AlkPhos  137  03-28        eGFR if Non African American: 15 mL/min/1.73M2 (03-30-20 @ 06:00)  eGFR if African American: 18 mL/min/1.73M2 (03-30-20 @ 06:00)      POCT Blood Glucose.: 288 mg/dL (30 Mar 2020 07:32)  POCT Blood Glucose.: 199 mg/dL (29 Mar 2020 19:26)  POCT Blood Glucose.: 198 mg/dL (29 Mar 2020 17:32)  POCT Blood Glucose.: 117 mg/dL (29 Mar 2020 11:39)    03-24 [id number] 5.6        Culture - Urine (collected 23 Mar 2020 16:25)  Source: .Urine Clean Catch (Midstream)  Final Report (24 Mar 2020 17:03):    No growth    Culture - Blood (collected 23 Mar 2020 16:25)  Source: .Blood Blood-Peripheral  Final Report (28 Mar 2020 22:01):    No growth at 5 days.    Culture - Blood (collected 23 Mar 2020 16:25)  Source: .Blood Blood-Peripheral  Final Report (28 Mar 2020 22:01):    No growth at 5 days.      RADIOLOGY & ADDITIONAL TESTS:    Care Discussed with Consultants/Other Providers:

## 2020-03-31 LAB
ALBUMIN SERPL ELPH-MCNC: 2 G/DL — LOW (ref 3.3–5)
ALP SERPL-CCNC: 123 U/L — HIGH (ref 40–120)
ALT FLD-CCNC: 28 U/L — SIGNIFICANT CHANGE UP (ref 10–45)
ANION GAP SERPL CALC-SCNC: 18 MMOL/L — HIGH (ref 5–17)
AST SERPL-CCNC: 31 U/L — SIGNIFICANT CHANGE UP (ref 10–40)
BILIRUB SERPL-MCNC: 0.3 MG/DL — SIGNIFICANT CHANGE UP (ref 0.2–1.2)
BUN SERPL-MCNC: 82 MG/DL — HIGH (ref 7–23)
CA-I BLD-SCNC: 0.9 MMOL/L — LOW (ref 1.12–1.3)
CALCIUM SERPL-MCNC: 6.6 MG/DL — LOW (ref 8.4–10.5)
CHLORIDE SERPL-SCNC: 101 MMOL/L — SIGNIFICANT CHANGE UP (ref 96–108)
CO2 SERPL-SCNC: 20 MMOL/L — LOW (ref 22–31)
CREAT SERPL-MCNC: 3.21 MG/DL — HIGH (ref 0.5–1.3)
GLUCOSE BLDC GLUCOMTR-MCNC: 201 MG/DL — HIGH (ref 70–99)
GLUCOSE BLDC GLUCOMTR-MCNC: 263 MG/DL — HIGH (ref 70–99)
GLUCOSE BLDC GLUCOMTR-MCNC: 273 MG/DL — HIGH (ref 70–99)
GLUCOSE BLDC GLUCOMTR-MCNC: 312 MG/DL — HIGH (ref 70–99)
GLUCOSE SERPL-MCNC: 261 MG/DL — HIGH (ref 70–99)
HCT VFR BLD CALC: 25.7 % — LOW (ref 39–50)
HGB BLD-MCNC: 7.9 G/DL — LOW (ref 13–17)
MAGNESIUM SERPL-MCNC: 1.8 MG/DL — SIGNIFICANT CHANGE UP (ref 1.6–2.6)
MCHC RBC-ENTMCNC: 28.5 PG — SIGNIFICANT CHANGE UP (ref 27–34)
MCHC RBC-ENTMCNC: 30.7 GM/DL — LOW (ref 32–36)
MCV RBC AUTO: 92.8 FL — SIGNIFICANT CHANGE UP (ref 80–100)
NRBC # BLD: 0 /100 WBCS — SIGNIFICANT CHANGE UP (ref 0–0)
PLATELET # BLD AUTO: 310 K/UL — SIGNIFICANT CHANGE UP (ref 150–400)
POTASSIUM SERPL-MCNC: 4.8 MMOL/L — SIGNIFICANT CHANGE UP (ref 3.5–5.3)
POTASSIUM SERPL-SCNC: 4.8 MMOL/L — SIGNIFICANT CHANGE UP (ref 3.5–5.3)
PROT SERPL-MCNC: 6.2 G/DL — SIGNIFICANT CHANGE UP (ref 6–8.3)
RBC # BLD: 2.77 M/UL — LOW (ref 4.2–5.8)
RBC # FLD: 14.4 % — SIGNIFICANT CHANGE UP (ref 10.3–14.5)
SODIUM SERPL-SCNC: 139 MMOL/L — SIGNIFICANT CHANGE UP (ref 135–145)
WBC # BLD: 11.8 K/UL — HIGH (ref 3.8–10.5)
WBC # FLD AUTO: 11.8 K/UL — HIGH (ref 3.8–10.5)

## 2020-03-31 PROCEDURE — 99233 SBSQ HOSP IP/OBS HIGH 50: CPT

## 2020-03-31 RX ORDER — CALCIUM GLUCONATE 100 MG/ML
2 VIAL (ML) INTRAVENOUS
Refills: 0 | Status: DISCONTINUED | OUTPATIENT
Start: 2020-03-31 | End: 2020-04-01

## 2020-03-31 RX ORDER — INSULIN LISPRO 100/ML
VIAL (ML) SUBCUTANEOUS
Refills: 0 | Status: DISCONTINUED | OUTPATIENT
Start: 2020-03-31 | End: 2020-04-10

## 2020-03-31 RX ORDER — INSULIN LISPRO 100/ML
VIAL (ML) SUBCUTANEOUS
Refills: 0 | Status: DISCONTINUED | OUTPATIENT
Start: 2020-03-31 | End: 2020-03-31

## 2020-03-31 RX ORDER — INSULIN LISPRO 100/ML
VIAL (ML) SUBCUTANEOUS AT BEDTIME
Refills: 0 | Status: DISCONTINUED | OUTPATIENT
Start: 2020-03-31 | End: 2020-04-10

## 2020-03-31 RX ORDER — INSULIN LISPRO 100/ML
VIAL (ML) SUBCUTANEOUS AT BEDTIME
Refills: 0 | Status: DISCONTINUED | OUTPATIENT
Start: 2020-03-31 | End: 2020-03-31

## 2020-03-31 RX ORDER — INSULIN GLARGINE 100 [IU]/ML
5 INJECTION, SOLUTION SUBCUTANEOUS AT BEDTIME
Refills: 0 | Status: DISCONTINUED | OUTPATIENT
Start: 2020-03-31 | End: 2020-04-03

## 2020-03-31 RX ADMIN — BUDESONIDE AND FORMOTEROL FUMARATE DIHYDRATE 2 PUFF(S): 160; 4.5 AEROSOL RESPIRATORY (INHALATION) at 21:43

## 2020-03-31 RX ADMIN — Medication 60 MILLIGRAM(S): at 17:53

## 2020-03-31 RX ADMIN — MONTELUKAST 10 MILLIGRAM(S): 4 TABLET, CHEWABLE ORAL at 13:48

## 2020-03-31 RX ADMIN — SERTRALINE 100 MILLIGRAM(S): 25 TABLET, FILM COATED ORAL at 12:16

## 2020-03-31 RX ADMIN — HEPARIN SODIUM 5000 UNIT(S): 5000 INJECTION INTRAVENOUS; SUBCUTANEOUS at 05:39

## 2020-03-31 RX ADMIN — BUDESONIDE AND FORMOTEROL FUMARATE DIHYDRATE 2 PUFF(S): 160; 4.5 AEROSOL RESPIRATORY (INHALATION) at 11:55

## 2020-03-31 RX ADMIN — ATORVASTATIN CALCIUM 10 MILLIGRAM(S): 80 TABLET, FILM COATED ORAL at 20:16

## 2020-03-31 RX ADMIN — BUPROPION HYDROCHLORIDE 150 MILLIGRAM(S): 150 TABLET, EXTENDED RELEASE ORAL at 12:17

## 2020-03-31 RX ADMIN — Medication 3: at 08:09

## 2020-03-31 RX ADMIN — Medication 650 MILLIGRAM(S): at 05:39

## 2020-03-31 RX ADMIN — Medication 650 MILLIGRAM(S): at 12:16

## 2020-03-31 RX ADMIN — Medication 25 MILLIGRAM(S): at 17:53

## 2020-03-31 RX ADMIN — HEPARIN SODIUM 5000 UNIT(S): 5000 INJECTION INTRAVENOUS; SUBCUTANEOUS at 21:56

## 2020-03-31 RX ADMIN — Medication 1334 MILLIGRAM(S): at 12:17

## 2020-03-31 RX ADMIN — HEPARIN SODIUM 5000 UNIT(S): 5000 INJECTION INTRAVENOUS; SUBCUTANEOUS at 17:57

## 2020-03-31 RX ADMIN — Medication 3 MILLILITER(S): at 21:41

## 2020-03-31 RX ADMIN — Medication 81 MILLIGRAM(S): at 12:17

## 2020-03-31 RX ADMIN — INSULIN GLARGINE 5 UNIT(S): 100 INJECTION, SOLUTION SUBCUTANEOUS at 22:23

## 2020-03-31 RX ADMIN — Medication 650 MILLIGRAM(S): at 23:02

## 2020-03-31 RX ADMIN — Medication 1334 MILLIGRAM(S): at 08:09

## 2020-03-31 RX ADMIN — Medication 25 MILLIGRAM(S): at 05:38

## 2020-03-31 RX ADMIN — Medication 60 MILLIGRAM(S): at 13:48

## 2020-03-31 RX ADMIN — CALCITRIOL 0.5 MICROGRAM(S): 0.5 CAPSULE ORAL at 12:17

## 2020-03-31 RX ADMIN — Medication 4: at 17:54

## 2020-03-31 RX ADMIN — Medication 3 MILLILITER(S): at 17:29

## 2020-03-31 RX ADMIN — Medication 200 MILLIGRAM(S): at 12:24

## 2020-03-31 RX ADMIN — Medication 60 MILLIGRAM(S): at 23:02

## 2020-03-31 RX ADMIN — Medication 3 MILLILITER(S): at 04:25

## 2020-03-31 RX ADMIN — Medication 1334 MILLIGRAM(S): at 17:57

## 2020-03-31 RX ADMIN — Medication 650 MILLIGRAM(S): at 17:53

## 2020-03-31 RX ADMIN — Medication 3: at 12:17

## 2020-03-31 RX ADMIN — Medication 3 MILLILITER(S): at 11:54

## 2020-03-31 RX ADMIN — Medication 60 MILLIGRAM(S): at 05:39

## 2020-03-31 NOTE — PROGRESS NOTE ADULT - SUBJECTIVE AND OBJECTIVE BOX
Patient is a 86y old  Male who presents with a chief complaint of Confusion, dyspnea (30 Mar 2020 18:12)    Patient seen and examined at bedside.  S: c/o continued cough (no worsening/acute change). Denies f/c.     ALLERGIES:  Aricept (Other)  Cipro (Unknown)  Demerol HCl (Unknown)    MEDICATIONS:  acetaminophen   Tablet .. 650 milliGRAM(s) Oral every 4 hours PRN  albuterol/ipratropium for Nebulization 3 milliLiter(s) Nebulizer every 6 hours  aspirin enteric coated 81 milliGRAM(s) Oral daily  atorvastatin 10 milliGRAM(s) Oral at bedtime  budesonide 160 MICROgram(s)/formoterol 4.5 MICROgram(s) Inhaler 2 Puff(s) Inhalation two times a day  buPROPion XL . 150 milliGRAM(s) Oral daily  calcitriol   Capsule 0.5 MICROGram(s) Oral daily  calcium acetate 1334 milliGRAM(s) Oral three times a day with meals  dextrose 40% Gel 15 Gram(s) Oral once PRN  dextrose 5%. 1000 milliLiter(s) IV Continuous <Continuous>  dextrose 50% Injectable 12.5 Gram(s) IV Push once  dextrose 50% Injectable 25 Gram(s) IV Push once  dextrose 50% Injectable 25 Gram(s) IV Push once  glucagon  Injectable 1 milliGRAM(s) IntraMuscular once PRN  guaiFENesin   Syrup  (Sugar-Free) 200 milliGRAM(s) Oral every 6 hours PRN  heparin  Injectable 5000 Unit(s) SubCutaneous every 8 hours  insulin glargine Injectable (LANTUS) 5 Unit(s) SubCutaneous at bedtime  insulin lispro (HumaLOG) corrective regimen sliding scale   SubCutaneous three times a day before meals  insulin lispro (HumaLOG) corrective regimen sliding scale   SubCutaneous at bedtime  methylPREDNISolone sodium succinate Injectable 60 milliGRAM(s) IV Push every 6 hours  metoprolol tartrate 25 milliGRAM(s) Oral two times a day  montelukast 10 milliGRAM(s) Oral daily  sertraline 100 milliGRAM(s) Oral daily  sodium bicarbonate 650 milliGRAM(s) Oral four times a day    Vital Signs Last 24 Hrs  T(F): 98 (31 Mar 2020 05:24), Max: 98.2 (30 Mar 2020 17:16)  HR: 76 (31 Mar 2020 11:56) (76 - 98)  BP: 168/85 (31 Mar 2020 05:24) (131/75 - 168/85)  RR: 17 (31 Mar 2020 05:24) (16 - 17)  SpO2: 97% (31 Mar 2020 11:56) (95% - 98%)  I&O's Summary    PHYSICAL EXAM:  General: NAD, A/O x 2 (Not to time)   ENT: MMM  Lungs: Persistent cough. Rhonci (somewhat limited exam 2/2 constant cough)  Cardio: RR, S1/S2, No murmurs  Abdomen: Soft, NT/ND, Normal active Bowel Sounds   Extremities: No cyanosis, No edema    LABS:                        7.9    11.80 )-----------( 310      ( 31 Mar 2020 06:57 )             25.7     03-31    139  |  101  |  82  ----------------------------<  261  4.8   |  20  |  3.21    Ca    6.6      31 Mar 2020 06:57  Mg     1.8     03-31    TPro  6.2  /  Alb  2.0  /  TBili  0.3  /  DBili  x   /  AST  31  /  ALT  28  /  AlkPhos  123  03-31    eGFR if Non African American: 17 mL/min/1.73M2 (03-31-20 @ 06:57)  eGFR if : 19 mL/min/1.73M2 (03-31-20 @ 06:57)      POCT Blood Glucose.: 263 mg/dL (31 Mar 2020 11:58)  POCT Blood Glucose.: 273 mg/dL (31 Mar 2020 08:04)  POCT Blood Glucose.: 171 mg/dL (30 Mar 2020 19:26)  POCT Blood Glucose.: 167 mg/dL (30 Mar 2020 16:27)    03-24 [id number] 5.6      RADIOLOGY & ADDITIONAL TESTS:  < from: Xray Chest 1 View-PORTABLE IMMEDIATE (03.29.20 @ 10:58) >  Impression: Bibasilar linear atelectasis and questionable left pleural effusion  Sclerotic bony metastases

## 2020-03-31 NOTE — PROGRESS NOTE ADULT - SUBJECTIVE AND OBJECTIVE BOX
Follow-up Pulm/pall Progress Note    Rod Avalos MD  [phone]    No new respiratory events overnight.  Denies SOB/CP. cough continues    Medications:  Vital Signs Last 24 Hrs  T(C): 36.7 (31 Mar 2020 05:24), Max: 36.8 (30 Mar 2020 17:16)  T(F): 98 (31 Mar 2020 05:24), Max: 98.2 (30 Mar 2020 17:16)  HR: 88 (31 Mar 2020 14:30) (76 - 98)  BP: 168/85 (31 Mar 2020 05:24) (131/75 - 168/85)  BP(mean): --  RR: 17 (31 Mar 2020 05:24) (16 - 17)  SpO2: 100% (31 Mar 2020 14:30) (95% - 100%)            LABS:                        7.9    11.80 )-----------( 310      ( 31 Mar 2020 06:57 )             25.7     03-31    139  |  101  |  82<H>  ----------------------------<  261<H>  4.8   |  20<L>  |  3.21<H>    Ca    6.6<L>      31 Mar 2020 06:57  Mg     1.8     03-31    TPro  6.2  /  Alb  2.0<L>  /  TBili  0.3  /  DBili  x   /  AST  31  /  ALT  28  /  AlkPhos  123<H>  03-31              CULTURES:        Physical Examination:  PULM: Clear to auscultation bilaterally, no significant sputum production  CVS: Regular rate and rhythm, no murmurs, rubs, or gallops  ABD: Soft, non-tender  EXT:  No clubbing, cyanosis, or edema

## 2020-03-31 NOTE — PROGRESS NOTE ADULT - SUBJECTIVE AND OBJECTIVE BOX
No distress    Vital Signs Last 24 Hrs  T(C): 36.7 (03-31-20 @ 05:24), Max: 36.7 (03-30-20 @ 23:04)  T(F): 98 (03-31-20 @ 05:24), Max: 98.1 (03-30-20 @ 23:04)  HR: 88 (03-31-20 @ 14:30) (76 - 98)  BP: 168/85 (03-31-20 @ 05:24) (131/75 - 168/85)  RR: 17 (03-31-20 @ 05:24) (16 - 17)  SpO2: 100% (03-31-20 @ 14:30) (95% - 100%)    Card S1S2  Lungs b/l air entry  Abd soft, NT, ND  Extr no edema                                                          7.9    11.80 )-----------( 310      ( 31 Mar 2020 06:57 )             25.7     31 Mar 2020 06:57    139    |  101    |  82     ----------------------------<  261    4.8     |  20     |  3.21     Ca    6.6        31 Mar 2020 06:57  Mg     1.8       31 Mar 2020 06:57    TPro  6.2    /  Alb  2.0    /  TBili  0.3    /  DBili  x      /  AST  31     /  ALT  28     /  AlkPhos  123    31 Mar 2020 06:57    LIVER FUNCTIONS - ( 31 Mar 2020 06:57 )  Alb: 2.0 g/dL / Pro: 6.2 g/dL / ALK PHOS: 123 U/L / ALT: 28 U/L / AST: 31 U/L / GGT: x           acetaminophen   Tablet .. 650 milliGRAM(s) Oral every 4 hours PRN  albuterol/ipratropium for Nebulization 3 milliLiter(s) Nebulizer every 6 hours  aspirin enteric coated 81 milliGRAM(s) Oral daily  atorvastatin 10 milliGRAM(s) Oral at bedtime  budesonide 160 MICROgram(s)/formoterol 4.5 MICROgram(s) Inhaler 2 Puff(s) Inhalation two times a day  buPROPion XL . 150 milliGRAM(s) Oral daily  calcitriol   Capsule 0.5 MICROGram(s) Oral daily  calcium acetate 1334 milliGRAM(s) Oral three times a day with meals  calcium gluconate IVPB 2 Gram(s) IV Intermittent every 2 hours  dextrose 40% Gel 15 Gram(s) Oral once PRN  dextrose 5%. 1000 milliLiter(s) IV Continuous <Continuous>  dextrose 50% Injectable 12.5 Gram(s) IV Push once  dextrose 50% Injectable 25 Gram(s) IV Push once  dextrose 50% Injectable 25 Gram(s) IV Push once  glucagon  Injectable 1 milliGRAM(s) IntraMuscular once PRN  guaiFENesin   Syrup  (Sugar-Free) 200 milliGRAM(s) Oral every 6 hours PRN  heparin  Injectable 5000 Unit(s) SubCutaneous every 8 hours  insulin glargine Injectable (LANTUS) 5 Unit(s) SubCutaneous at bedtime  insulin lispro (HumaLOG) corrective regimen sliding scale   SubCutaneous three times a day before meals  insulin lispro (HumaLOG) corrective regimen sliding scale   SubCutaneous at bedtime  methylPREDNISolone sodium succinate Injectable 60 milliGRAM(s) IV Push every 6 hours  metoprolol tartrate 25 milliGRAM(s) Oral two times a day  montelukast 10 milliGRAM(s) Oral daily  sertraline 100 milliGRAM(s) Oral daily  sodium bicarbonate 650 milliGRAM(s) Oral four times a day    A/P:    Hx COPD, CKD 4 (Cr 2.8 - 5/18/19), metastatic prostate ca  COPD exacerbation, possibly lung mets (COVID negative)  Severe hypocalcemia w/QT prolongation due to Denosumab (got on 3/12) exacerbated by bone disorder of advanced CKD w/low PTH and hyperphosphatemia, intermittent hypomagnesemia  Phoslo 2 tabs PO TID  Calcitriol PO  IV Ca Gluconate daily until Ca improves  Suppl Mg as needed  F/u Ca, iCa level, CMP, Mg, Phos daily  Hold diuretics  Avoid Denosumab in the future given CKD 4

## 2020-03-31 NOTE — PROGRESS NOTE ADULT - ASSESSMENT
A/P: 85yo male w. PMH Dementia, DM, HTN, Depression, CKD4, COPD presents to the ED with difficulty breathing and progressive confusion. Admitted delia calle COPD exacerbation.       *SOB likely 2/2 metabolic acidosis, COPD exacerbation  Hx diastolic HF pEF- exacerbation does not seem likely  Appreciate Cards recs    Afebrile, COVID 19-Negative  CXR: patchy infiltrates in RLL; will continue to monitor off abx for now; procalcitonin 0.86  Appreciate Pulm recs   Continue Albuterol, Symbicort, Nebs   Continue IV steroids (PPI while on Steroids)- Leukocytosis possible in s/o steroids    Cont Ativan 0.25mg q8 prn  Add Guaifenesin   CXR on 3/29 with possible L pleural effusion.     *Metabolic encephalopathy: 2/2 metabolic acidosis/multiple electrolytes abnormalities likely 2/2 Denosumab & PABLITO  Improving  Cont to correct Lytes  Trend BMP  Aspiration precautions  Appreciate Renal recs     *PABLITO (Cr 2.8 in May 2019) on CKD stage 4   Cr slowly improving   Renal following  Avoid nephrotoxic meds    *Normocytic anemia 2/2 CKD  Trend H/H  Consider need for transfusion     *Hypocalcemia likely 2/2 Denosumab vs secondary hyperparathyroidism   Prolonged QTc on ECG (566) on admission - improving ( on 3/25)  Continue Rocaltrol Phoslo   Calcium gluconate IV as per renal  Appreciate Renal recs   Trend calcium levels   Replete per renal recs    *Hypomagnesemia and Hyperkalemia   Resolved    *Urinary retention  Improved  Cont Flomax    *HTN  Continue Amlodipine, Metoprolol     *DMII  Start Lantus 5 Units HS   Cont FS/ISS    *Depression  Cont Sertraline, Buproprion (home dose buproprion 100mg BID, pharmacy here only has 150mg XL)    *DVT ppx  UFH    *Goals of care: DNR/DNI

## 2020-04-01 LAB
ALBUMIN SERPL ELPH-MCNC: 2 G/DL — LOW (ref 3.3–5)
ALP SERPL-CCNC: 111 U/L — SIGNIFICANT CHANGE UP (ref 40–120)
ALT FLD-CCNC: 31 U/L — SIGNIFICANT CHANGE UP (ref 10–45)
ANION GAP SERPL CALC-SCNC: 15 MMOL/L — SIGNIFICANT CHANGE UP (ref 5–17)
AST SERPL-CCNC: 28 U/L — SIGNIFICANT CHANGE UP (ref 10–40)
BILIRUB SERPL-MCNC: 0.2 MG/DL — SIGNIFICANT CHANGE UP (ref 0.2–1.2)
BUN SERPL-MCNC: 87 MG/DL — HIGH (ref 7–23)
CA-I BLD-SCNC: 0.92 MMOL/L — LOW (ref 1.12–1.3)
CALCIUM SERPL-MCNC: 6.7 MG/DL — LOW (ref 8.4–10.5)
CHLORIDE SERPL-SCNC: 108 MMOL/L — SIGNIFICANT CHANGE UP (ref 96–108)
CO2 SERPL-SCNC: 23 MMOL/L — SIGNIFICANT CHANGE UP (ref 22–31)
CREAT SERPL-MCNC: 3.08 MG/DL — HIGH (ref 0.5–1.3)
GLUCOSE BLDC GLUCOMTR-MCNC: 199 MG/DL — HIGH (ref 70–99)
GLUCOSE BLDC GLUCOMTR-MCNC: 209 MG/DL — HIGH (ref 70–99)
GLUCOSE BLDC GLUCOMTR-MCNC: 242 MG/DL — HIGH (ref 70–99)
GLUCOSE BLDC GLUCOMTR-MCNC: 306 MG/DL — HIGH (ref 70–99)
GLUCOSE SERPL-MCNC: 292 MG/DL — HIGH (ref 70–99)
HCT VFR BLD CALC: 24.6 % — LOW (ref 39–50)
HGB BLD-MCNC: 7.4 G/DL — LOW (ref 13–17)
MAGNESIUM SERPL-MCNC: 1.8 MG/DL — SIGNIFICANT CHANGE UP (ref 1.6–2.6)
MCHC RBC-ENTMCNC: 27.8 PG — SIGNIFICANT CHANGE UP (ref 27–34)
MCHC RBC-ENTMCNC: 30.1 GM/DL — LOW (ref 32–36)
MCV RBC AUTO: 92.5 FL — SIGNIFICANT CHANGE UP (ref 80–100)
NRBC # BLD: 0 /100 WBCS — SIGNIFICANT CHANGE UP (ref 0–0)
PHOSPHATE SERPL-MCNC: 3.4 MG/DL — SIGNIFICANT CHANGE UP (ref 2.5–4.5)
PLATELET # BLD AUTO: 316 K/UL — SIGNIFICANT CHANGE UP (ref 150–400)
POTASSIUM SERPL-MCNC: 4.7 MMOL/L — SIGNIFICANT CHANGE UP (ref 3.5–5.3)
POTASSIUM SERPL-SCNC: 4.7 MMOL/L — SIGNIFICANT CHANGE UP (ref 3.5–5.3)
PROT SERPL-MCNC: 5.8 G/DL — LOW (ref 6–8.3)
RBC # BLD: 2.66 M/UL — LOW (ref 4.2–5.8)
RBC # FLD: 14.2 % — SIGNIFICANT CHANGE UP (ref 10.3–14.5)
SODIUM SERPL-SCNC: 146 MMOL/L — HIGH (ref 135–145)
WBC # BLD: 10.38 K/UL — SIGNIFICANT CHANGE UP (ref 3.8–10.5)
WBC # FLD AUTO: 10.38 K/UL — SIGNIFICANT CHANGE UP (ref 3.8–10.5)

## 2020-04-01 PROCEDURE — 99233 SBSQ HOSP IP/OBS HIGH 50: CPT

## 2020-04-01 PROCEDURE — 99232 SBSQ HOSP IP/OBS MODERATE 35: CPT | Mod: GC

## 2020-04-01 RX ORDER — SCOPALAMINE 1 MG/3D
1 PATCH, EXTENDED RELEASE TRANSDERMAL ONCE
Refills: 0 | Status: COMPLETED | OUTPATIENT
Start: 2020-04-01 | End: 2020-04-01

## 2020-04-01 RX ORDER — AMLODIPINE BESYLATE 2.5 MG/1
5 TABLET ORAL DAILY
Refills: 0 | Status: DISCONTINUED | OUTPATIENT
Start: 2020-04-01 | End: 2020-04-06

## 2020-04-01 RX ORDER — CALCIUM GLUCONATE 100 MG/ML
2 VIAL (ML) INTRAVENOUS
Refills: 0 | Status: COMPLETED | OUTPATIENT
Start: 2020-04-01 | End: 2020-04-01

## 2020-04-01 RX ADMIN — ATORVASTATIN CALCIUM 10 MILLIGRAM(S): 80 TABLET, FILM COATED ORAL at 21:11

## 2020-04-01 RX ADMIN — Medication 2: at 16:27

## 2020-04-01 RX ADMIN — Medication 3 MILLILITER(S): at 09:00

## 2020-04-01 RX ADMIN — CALCITRIOL 0.5 MICROGRAM(S): 0.5 CAPSULE ORAL at 12:59

## 2020-04-01 RX ADMIN — Medication 25 MILLIGRAM(S): at 05:57

## 2020-04-01 RX ADMIN — Medication 20 MILLIGRAM(S): at 16:29

## 2020-04-01 RX ADMIN — Medication 200 MILLIGRAM(S): at 03:38

## 2020-04-01 RX ADMIN — Medication 1334 MILLIGRAM(S): at 08:48

## 2020-04-01 RX ADMIN — Medication 1: at 13:01

## 2020-04-01 RX ADMIN — HEPARIN SODIUM 5000 UNIT(S): 5000 INJECTION INTRAVENOUS; SUBCUTANEOUS at 05:57

## 2020-04-01 RX ADMIN — HEPARIN SODIUM 5000 UNIT(S): 5000 INJECTION INTRAVENOUS; SUBCUTANEOUS at 21:10

## 2020-04-01 RX ADMIN — Medication 3 MILLILITER(S): at 22:36

## 2020-04-01 RX ADMIN — Medication 650 MILLIGRAM(S): at 05:57

## 2020-04-01 RX ADMIN — MONTELUKAST 10 MILLIGRAM(S): 4 TABLET, CHEWABLE ORAL at 12:59

## 2020-04-01 RX ADMIN — Medication 3 MILLILITER(S): at 04:28

## 2020-04-01 RX ADMIN — BUPROPION HYDROCHLORIDE 150 MILLIGRAM(S): 150 TABLET, EXTENDED RELEASE ORAL at 12:57

## 2020-04-01 RX ADMIN — BUDESONIDE AND FORMOTEROL FUMARATE DIHYDRATE 2 PUFF(S): 160; 4.5 AEROSOL RESPIRATORY (INHALATION) at 08:55

## 2020-04-01 RX ADMIN — Medication 25 GRAM(S): at 21:11

## 2020-04-01 RX ADMIN — Medication 650 MILLIGRAM(S): at 09:29

## 2020-04-01 RX ADMIN — Medication 60 MILLIGRAM(S): at 05:57

## 2020-04-01 RX ADMIN — Medication 25 GRAM(S): at 16:29

## 2020-04-01 RX ADMIN — BUDESONIDE AND FORMOTEROL FUMARATE DIHYDRATE 2 PUFF(S): 160; 4.5 AEROSOL RESPIRATORY (INHALATION) at 22:36

## 2020-04-01 RX ADMIN — Medication 650 MILLIGRAM(S): at 13:00

## 2020-04-01 RX ADMIN — SERTRALINE 100 MILLIGRAM(S): 25 TABLET, FILM COATED ORAL at 12:59

## 2020-04-01 RX ADMIN — SCOPALAMINE 1 PATCH: 1 PATCH, EXTENDED RELEASE TRANSDERMAL at 12:59

## 2020-04-01 RX ADMIN — AMLODIPINE BESYLATE 5 MILLIGRAM(S): 2.5 TABLET ORAL at 08:48

## 2020-04-01 RX ADMIN — Medication 1334 MILLIGRAM(S): at 13:00

## 2020-04-01 RX ADMIN — HEPARIN SODIUM 5000 UNIT(S): 5000 INJECTION INTRAVENOUS; SUBCUTANEOUS at 13:09

## 2020-04-01 RX ADMIN — Medication 4: at 08:20

## 2020-04-01 RX ADMIN — Medication 3 MILLILITER(S): at 14:50

## 2020-04-01 RX ADMIN — SCOPALAMINE 1 PATCH: 1 PATCH, EXTENDED RELEASE TRANSDERMAL at 20:28

## 2020-04-01 RX ADMIN — Medication 1334 MILLIGRAM(S): at 16:32

## 2020-04-01 RX ADMIN — INSULIN GLARGINE 5 UNIT(S): 100 INJECTION, SOLUTION SUBCUTANEOUS at 21:13

## 2020-04-01 RX ADMIN — Medication 81 MILLIGRAM(S): at 12:56

## 2020-04-01 RX ADMIN — Medication 25 MILLIGRAM(S): at 16:40

## 2020-04-01 NOTE — PROGRESS NOTE ADULT - SUBJECTIVE AND OBJECTIVE BOX
Follow-up Pulm Progress Note    Rod Avalos MD  [phone]    No new respiratory events overnight.  Minimal dyspnea.  Mostly complaing of cough and increased upper airway secretions.    Medications:  Vital Signs Last 24 Hrs  T(C): 36.7 (01 Apr 2020 06:00), Max: 36.7 (31 Mar 2020 20:20)  T(F): 98.1 (01 Apr 2020 06:00), Max: 98.1 (01 Apr 2020 06:00)  HR: 8 (01 Apr 2020 14:51) (8 - 81)  BP: 162/74 (01 Apr 2020 06:00) (162/74 - 179/85)  BP(mean): --  RR: 16 (01 Apr 2020 06:00) (15 - 16)  SpO2: 96% (01 Apr 2020 14:51) (96% - 100%)              LABS:                        7.4    10.38 )-----------( 316      ( 01 Apr 2020 07:24 )             24.6     04-01    146<H>  |  108  |  87<H>  ----------------------------<  292<H>  4.7   |  23  |  3.08<H>    Ca    6.7<L>      01 Apr 2020 07:24  Phos  3.4     04-01  Mg     1.8     04-01    TPro  5.8<L>  /  Alb  2.0<L>  /  TBili  0.2  /  DBili  x   /  AST  28  /  ALT  31  /  AlkPhos  111  04-01              CULTURES:        Physical Examination:  PULM: scattered coarse bs  CVS: Regular rate and rhythm, no murmurs, rubs, or gallops  ABD: Soft, non-tender  EXT:  No clubbing, cyanosis, or edema    RADIOLOGY REVIEWED  CXR:< from: Xray Chest 1 View-PORTABLE IMMEDIATE (03.29.20 @ 10:58) >  EXAM:  XR CHEST PORTABLE IMMED 1V      PROCEDURE DATE:  03/29/2020        INTERPRETATION:  History: Shortness of breath, prostate cancer    Portable comparison is made to 3/24/2020    Low lung volumes are again seen. There is linear atelectasis at the right base. There is suggestion of linear atelectasis at the small left pleural effusion. Bony structures demonstrate scattered sclerotic metastatic lesions.    Impression: Bibasilar linear atelectasis and questionable left pleural effusion  Sclerotic bony metastases                  SPIKE CEDEÑO M.D.,ATTENDING RADIOLOGIST  This document has been electronically signed. Mar 29 2020 11:16AM    < end of copied text >

## 2020-04-01 NOTE — DIETITIAN INITIAL EVALUATION ADULT. - OTHER INFO
Pt. w/ dementia-poor historian . Intake fair as reported by nursing. Tolerates DASH CONS. CHO diet w/ HS snack. 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 Denies any N/V/ diarrhea. Last BM was 3/29. Edema B/L hands. Skin intact.  Pt. may benefit by supplements. Weight hx. unknown. No dysphagia noted.

## 2020-04-01 NOTE — PROGRESS NOTE ADULT - ASSESSMENT
- BP elevated; Norvasc d/c on 3/25 reason unclear, appears to be  home med will restart  - BGM elevated 2/2 steroids, Lantus 5 units QHS started yesterday, will continue to monitor and adjust as needed  - Hypocalcemia  - COPD exacerbation:PO steroids. will add scopalamine patch for increased upper aitway secretions.

## 2020-04-01 NOTE — PROGRESS NOTE ADULT - SUBJECTIVE AND OBJECTIVE BOX
86M who presents with a chief complaint of Confusion, dyspnea, admitted for COPD exacerbation.    4/1: Pt seen and examined at bedside. Pt is feeling a bit better, still coughing. He wants to go home.    REVIEW OF SYSTEMS:    CONSTITUTIONAL: +weakness. No fevers or chills  EYES/ENT: No visual changes;  No vertigo or throat pain   NECK: No pain or stiffness  RESPIRATORY: +cough. No wheezing, hemoptysis; No shortness of breath  CARDIOVASCULAR: No chest pain or palpitations  GASTROINTESTINAL: No abdominal or epigastric pain. No nausea, vomiting, or hematemesis; No diarrhea or constipation. No melena or hematochezia.  GENITOURINARY: No dysuria, frequency or hematuria  NEUROLOGICAL: No numbness or weakness  SKIN: No itching, burning, rashes, or lesions   All other review of systems is negative unless indicated above.    Vital Signs Last 24 Hrs  T(C): 36.7 (04-01-20 @ 06:00)  T(F): 98.1 (04-01-20 @ 06:00), Max: 98.1 (04-01-20 @ 06:00)  HR: 8 (04-01-20 @ 14:51) (8 - 81)  BP: 162/74 (04-01-20 @ 06:00)  BP(mean): --  RR: 16 (04-01-20 @ 06:00) (15 - 16)  SpO2: 96% (04-01-20 @ 14:51) (96% - 100%)  Wt(kg): --    04-01 @ 07:01  -  04-01 @ 15:31  --------------------------------------------------------  IN: 220 mL / OUT: 0 mL / NET: 220 mL    PHYSICAL EXAM:    GENERAL: AOx3, NAD, well-groomed, well-developed  HEAD:  NC/AT  EYES: EOMI, PERRLA, no scleral icterus  HEENT: Moist mucous membranes  NECK: Supple, No JVD  LUNG: +rhonchi , no wheezing, or rubs  HEART: RRR; No murmurs, rubs, or gallops  ABDOMEN: ST/ND/NT  EXTREMITIES:  2+ Peripheral Pulses, No clubbing, cyanosis, or edema  MUSCULOSKELETAL- Joints normal ROM, no Muscle or joint tenderness    Lab Results:                                            7.4                   Neurophils% (auto):   x      (04-01 @ 07:24):    10.38)-----------(316          Lymphocytes% (auto):  x                                             24.6                   Eosinphils% (auto):   x        Manual%: Neutrophils x    ; Lymphocytes x    ; Eosinophils x    ; Bands%: x    ; Blasts x         Differential:	[] Automated		[] Manual    04-01    146<H>  |  108  |  87<H>  ----------------------------<  292<H>  4.7   |  23  |  3.08<H>    Ca    6.7<L>      01 Apr 2020 07:24  Phos  3.4     04-01  Mg     1.8     04-01    TPro  5.8<L>  /  Alb  2.0<L>  /  TBili  0.2  /  DBili  x   /  AST  28  /  ALT  31  /  AlkPhos  111  04-01    03-24 UiapantiqlH3H 5.6      RADIOLOGY & ADDITIONAL TESTS:  < from: Xray Chest 1 View-PORTABLE IMMEDIATE (03.29.20 @ 10:58) >  Impression: Bibasilar linear atelectasis and questionable left pleural effusion  Sclerotic bony metastases

## 2020-04-01 NOTE — PROGRESS NOTE ADULT - SUBJECTIVE AND OBJECTIVE BOX
No distress    Vital Signs Last 24 Hrs  T(C): 36.7 (04-01-20 @ 06:00), Max: 36.7 (03-31-20 @ 20:20)  T(F): 98.1 (04-01-20 @ 06:00), Max: 98.1 (04-01-20 @ 06:00)  HR: 74 (04-01-20 @ 09:00) (58 - 88)  BP: 162/74 (04-01-20 @ 06:00) (162/74 - 179/85)  RR: 16 (04-01-20 @ 06:00) (15 - 16)  SpO2: 97% (04-01-20 @ 09:00) (97% - 100%)    Card S1S2  Lungs b/l air entry  Abd soft, NT, ND  Extr no edema                                                                  7.4    10.38 )-----------( 316      ( 01 Apr 2020 07:24 )             24.6     01 Apr 2020 07:24    146    |  108    |  87     ----------------------------<  292    4.7     |  23     |  3.08     Ca    6.7        01 Apr 2020 07:24  Phos  3.4       01 Apr 2020 07:24  Mg     1.8       01 Apr 2020 07:24    TPro  5.8    /  Alb  2.0    /  TBili  0.2    /  DBili  x      /  AST  28     /  ALT  31     /  AlkPhos  111    01 Apr 2020 07:24    LIVER FUNCTIONS - ( 01 Apr 2020 07:24 )  Alb: 2.0 g/dL / Pro: 5.8 g/dL / ALK PHOS: 111 U/L / ALT: 31 U/L / AST: 28 U/L / GGT: x           acetaminophen   Tablet .. 650 milliGRAM(s) Oral every 4 hours PRN  albuterol/ipratropium for Nebulization 3 milliLiter(s) Nebulizer every 6 hours  amLODIPine   Tablet 5 milliGRAM(s) Oral daily  aspirin enteric coated 81 milliGRAM(s) Oral daily  atorvastatin 10 milliGRAM(s) Oral at bedtime  budesonide 160 MICROgram(s)/formoterol 4.5 MICROgram(s) Inhaler 2 Puff(s) Inhalation two times a day  buPROPion XL . 150 milliGRAM(s) Oral daily  calcitriol   Capsule 0.5 MICROGram(s) Oral daily  calcium acetate 1334 milliGRAM(s) Oral three times a day with meals  calcium gluconate IVPB 2 Gram(s) IV Intermittent every 2 hours  dextrose 40% Gel 15 Gram(s) Oral once PRN  dextrose 5%. 1000 milliLiter(s) IV Continuous <Continuous>  dextrose 50% Injectable 12.5 Gram(s) IV Push once  dextrose 50% Injectable 25 Gram(s) IV Push once  dextrose 50% Injectable 25 Gram(s) IV Push once  glucagon  Injectable 1 milliGRAM(s) IntraMuscular once PRN  guaiFENesin   Syrup  (Sugar-Free) 200 milliGRAM(s) Oral every 6 hours PRN  heparin  Injectable 5000 Unit(s) SubCutaneous every 8 hours  insulin glargine Injectable (LANTUS) 5 Unit(s) SubCutaneous at bedtime  insulin lispro (HumaLOG) corrective regimen sliding scale   SubCutaneous three times a day before meals  insulin lispro (HumaLOG) corrective regimen sliding scale   SubCutaneous at bedtime  metoprolol tartrate 25 milliGRAM(s) Oral two times a day  montelukast 10 milliGRAM(s) Oral daily  predniSONE   Tablet 20 milliGRAM(s) Oral daily  sertraline 100 milliGRAM(s) Oral daily    A/P:    Hx COPD, CKD 4 (Cr 2.8 - 5/18/19), metastatic prostate ca  COPD exacerbation, possibly lung mets (COVID negative)  Severe hypocalcemia w/QT prolongation on adm due to Denosumab (got on 3/12) exacerbated by bone disorder of advanced CKD w/low PTH and hyperphosphatemia, intermittent hypomagnesemia  Phoslo 2 tabs PO TID  Calcitriol PO  IV Ca Gluconate daily until Ca improves  Suppl Mg as needed  F/u Ca, iCa level, CMP, Mg, Phos daily  Avoid Denosumab in the future given CKD 4

## 2020-04-01 NOTE — PROGRESS NOTE ADULT - ASSESSMENT
- BP elevated; Norvasc d/c on 3/25 reason unclear, appears to be  home med will restart  - BGM elevated 2/2 steroids, Lantus 5 units QHS started yesterday, will continue to monitor and adjust as needed  - Hypocalcemia  - COPD exacerbation 86M w/ PMH of Dementia, DM, HTN, Depression, CKD4, COPD presents to the ED with difficulty breathing and progressive confusion. Admitted delia calle COPD exacerbation.       *SOB likely 2/2 metabolic acidosis, COPD exacerbation  Hx diastolic HF pEF- exacerbation does not seem likely  Appreciate Cards recs    Afebrile, COVID 19-Negative  CXR: patchy infiltrates in RLL; will continue to monitor off abx for now; procalcitonin 0.86  Appreciate Pulm recs   Continue Albuterol, Symbicort, Nebs   Continue IV steroids (PPI while on Steroids)- Leukocytosis possible in s/o steroids    Cont Ativan 0.25mg q8 prn  Cont Guaifenesin   CXR on 3/29 with possible L pleural effusion.     *Metabolic encephalopathy: 2/2 metabolic acidosis/multiple electrolytes abnormalities likely 2/2 Denosumab & PABLITO  Improving  Cont to correct Lytes  Trend BMP  Aspiration precautions  Appreciate Renal recs     *PABLITO (Cr 2.8 in May 2019) on CKD stage 4   Cr slowly improving   Renal following  Avoid nephrotoxic meds    *Normocytic anemia 2/2 CKD  Trend H/H  Consider need for transfusion     *Hypocalcemia likely 2/2 Denosumab vs secondary hyperparathyroidism   Prolonged QTc on ECG (566) on admission - improving ( on 3/25)  Continue Rocaltrol Phoslo   Calcium gluconate IV as per renal  Appreciate Renal recs   Trend calcium levels   Replete per renal recs    *Hypomagnesemia and Hyperkalemia   Resolved    *Urinary retention  Improved  Cont Flomax    *HTN  Continue Amlodipine, Metoprolol     *DMII  Cont Lantus 5 Units HS   Cont FS/ISS  Blood glu elevated likely due to steroids    *Depression  Cont Sertraline, Buproprion (home dose buproprion 100mg BID, pharmacy here only has 150mg XL)    *DVT ppx  UFH    *Goals of care: DNR/DNI

## 2020-04-02 LAB
ANION GAP SERPL CALC-SCNC: 11 MMOL/L — SIGNIFICANT CHANGE UP (ref 5–17)
BUN SERPL-MCNC: 80 MG/DL — HIGH (ref 7–23)
CA-I BLD-SCNC: 0.98 MMOL/L — LOW (ref 1.12–1.3)
CALCIUM SERPL-MCNC: 6.6 MG/DL — LOW (ref 8.4–10.5)
CHLORIDE SERPL-SCNC: 111 MMOL/L — HIGH (ref 96–108)
CO2 SERPL-SCNC: 26 MMOL/L — SIGNIFICANT CHANGE UP (ref 22–31)
CREAT SERPL-MCNC: 2.97 MG/DL — HIGH (ref 0.5–1.3)
GLUCOSE BLDC GLUCOMTR-MCNC: 140 MG/DL — HIGH (ref 70–99)
GLUCOSE BLDC GLUCOMTR-MCNC: 287 MG/DL — HIGH (ref 70–99)
GLUCOSE BLDC GLUCOMTR-MCNC: 304 MG/DL — HIGH (ref 70–99)
GLUCOSE BLDC GLUCOMTR-MCNC: 317 MG/DL — HIGH (ref 70–99)
GLUCOSE SERPL-MCNC: 301 MG/DL — HIGH (ref 70–99)
HCT VFR BLD CALC: 24 % — LOW (ref 39–50)
HGB BLD-MCNC: 7.4 G/DL — LOW (ref 13–17)
MAGNESIUM SERPL-MCNC: 1.6 MG/DL — SIGNIFICANT CHANGE UP (ref 1.6–2.6)
MCHC RBC-ENTMCNC: 28.5 PG — SIGNIFICANT CHANGE UP (ref 27–34)
MCHC RBC-ENTMCNC: 30.8 GM/DL — LOW (ref 32–36)
MCV RBC AUTO: 92.3 FL — SIGNIFICANT CHANGE UP (ref 80–100)
NRBC # BLD: 0 /100 WBCS — SIGNIFICANT CHANGE UP (ref 0–0)
PHOSPHATE SERPL-MCNC: 2.6 MG/DL — SIGNIFICANT CHANGE UP (ref 2.5–4.5)
PLATELET # BLD AUTO: 359 K/UL — SIGNIFICANT CHANGE UP (ref 150–400)
POTASSIUM SERPL-MCNC: 5 MMOL/L — SIGNIFICANT CHANGE UP (ref 3.5–5.3)
POTASSIUM SERPL-SCNC: 5 MMOL/L — SIGNIFICANT CHANGE UP (ref 3.5–5.3)
RBC # BLD: 2.6 M/UL — LOW (ref 4.2–5.8)
RBC # FLD: 14.1 % — SIGNIFICANT CHANGE UP (ref 10.3–14.5)
SODIUM SERPL-SCNC: 148 MMOL/L — HIGH (ref 135–145)
WBC # BLD: 10.31 K/UL — SIGNIFICANT CHANGE UP (ref 3.8–10.5)
WBC # FLD AUTO: 10.31 K/UL — SIGNIFICANT CHANGE UP (ref 3.8–10.5)

## 2020-04-02 PROCEDURE — 99233 SBSQ HOSP IP/OBS HIGH 50: CPT

## 2020-04-02 RX ORDER — CALCIUM GLUCONATE 100 MG/ML
2 VIAL (ML) INTRAVENOUS EVERY 4 HOURS
Refills: 0 | Status: COMPLETED | OUTPATIENT
Start: 2020-04-02 | End: 2020-04-02

## 2020-04-02 RX ORDER — SODIUM CHLORIDE 9 MG/ML
1000 INJECTION, SOLUTION INTRAVENOUS
Refills: 0 | Status: DISCONTINUED | OUTPATIENT
Start: 2020-04-02 | End: 2020-04-03

## 2020-04-02 RX ADMIN — INSULIN GLARGINE 5 UNIT(S): 100 INJECTION, SOLUTION SUBCUTANEOUS at 22:34

## 2020-04-02 RX ADMIN — Medication 20 MILLIGRAM(S): at 05:00

## 2020-04-02 RX ADMIN — ATORVASTATIN CALCIUM 10 MILLIGRAM(S): 80 TABLET, FILM COATED ORAL at 22:31

## 2020-04-02 RX ADMIN — Medication 25 MILLIGRAM(S): at 05:00

## 2020-04-02 RX ADMIN — HEPARIN SODIUM 5000 UNIT(S): 5000 INJECTION INTRAVENOUS; SUBCUTANEOUS at 05:00

## 2020-04-02 RX ADMIN — BUDESONIDE AND FORMOTEROL FUMARATE DIHYDRATE 2 PUFF(S): 160; 4.5 AEROSOL RESPIRATORY (INHALATION) at 09:41

## 2020-04-02 RX ADMIN — Medication 200 GRAM(S): at 13:59

## 2020-04-02 RX ADMIN — Medication 1334 MILLIGRAM(S): at 12:00

## 2020-04-02 RX ADMIN — Medication 81 MILLIGRAM(S): at 12:12

## 2020-04-02 RX ADMIN — HEPARIN SODIUM 5000 UNIT(S): 5000 INJECTION INTRAVENOUS; SUBCUTANEOUS at 22:32

## 2020-04-02 RX ADMIN — Medication 25 MILLIGRAM(S): at 17:08

## 2020-04-02 RX ADMIN — Medication 3 MILLILITER(S): at 21:17

## 2020-04-02 RX ADMIN — Medication 3 MILLILITER(S): at 09:41

## 2020-04-02 RX ADMIN — Medication 200 GRAM(S): at 17:09

## 2020-04-02 RX ADMIN — Medication 3 MILLILITER(S): at 15:07

## 2020-04-02 RX ADMIN — HEPARIN SODIUM 5000 UNIT(S): 5000 INJECTION INTRAVENOUS; SUBCUTANEOUS at 12:13

## 2020-04-02 RX ADMIN — SCOPALAMINE 1 PATCH: 1 PATCH, EXTENDED RELEASE TRANSDERMAL at 16:46

## 2020-04-02 RX ADMIN — CALCITRIOL 0.5 MICROGRAM(S): 0.5 CAPSULE ORAL at 12:12

## 2020-04-02 RX ADMIN — Medication 200 GRAM(S): at 22:32

## 2020-04-02 RX ADMIN — Medication 4: at 12:07

## 2020-04-02 RX ADMIN — Medication 1334 MILLIGRAM(S): at 12:12

## 2020-04-02 RX ADMIN — Medication 3 MILLILITER(S): at 05:52

## 2020-04-02 RX ADMIN — SERTRALINE 100 MILLIGRAM(S): 25 TABLET, FILM COATED ORAL at 12:12

## 2020-04-02 RX ADMIN — SCOPALAMINE 1 PATCH: 1 PATCH, EXTENDED RELEASE TRANSDERMAL at 12:04

## 2020-04-02 RX ADMIN — Medication 3: at 17:09

## 2020-04-02 RX ADMIN — BUPROPION HYDROCHLORIDE 150 MILLIGRAM(S): 150 TABLET, EXTENDED RELEASE ORAL at 12:12

## 2020-04-02 RX ADMIN — AMLODIPINE BESYLATE 5 MILLIGRAM(S): 2.5 TABLET ORAL at 05:00

## 2020-04-02 RX ADMIN — Medication 1334 MILLIGRAM(S): at 17:08

## 2020-04-02 RX ADMIN — MONTELUKAST 10 MILLIGRAM(S): 4 TABLET, CHEWABLE ORAL at 12:12

## 2020-04-02 RX ADMIN — BUDESONIDE AND FORMOTEROL FUMARATE DIHYDRATE 2 PUFF(S): 160; 4.5 AEROSOL RESPIRATORY (INHALATION) at 21:16

## 2020-04-02 RX ADMIN — Medication 2: at 22:33

## 2020-04-02 NOTE — CHART NOTE - NSCHARTNOTEFT_GEN_A_CORE
Patient has percussion bed. Bed put on percussion setting when patient receiving breathing treatment. Post treatment patient states that he feels better. However patient now complaining onf left 4th finger pain. He is unsure of when the pain started. Will order Right hand Xray.

## 2020-04-02 NOTE — PROGRESS NOTE ADULT - ASSESSMENT
86M w/ PMH of Dementia, DM, HTN, Depression, CKD4, COPD presents to the ED with difficulty breathing and progressive confusion. Admitted delia calle COPD exacerbation.   Pulm/Pall:  Clinically improved today    *SOB likely 2/2 metabolic acidosis, COPD exacerbation - improving  Hx diastolic HF pEF- exacerbation does not seem likely  Afebrile, COVID 19-Negative  CXR: patchy infiltrates in RLL; will continue to monitor off abx for now; procalcitonin 0.86  Continue Albuterol, Symbicort, Nebs   Continue PO steroids  Cont Ativan 0.25mg q8 prn  Cont Guaifenesin   CXR on 3/29 with possible L pleural effusion.       *Metabolic encephalopathy: 2/2 metabolic acidosis/multiple electrolytes abnormalities likely 2/2 Denosumab & PABLITO  Improving  Cont to correct Lytes  Trend BMP  Aspiration precautions  Appreciate Renal recs     *PABLITO (Cr 2.8 in May 2019) on CKD stage 4   Cr slowly improving   Renal following  Avoid nephrotoxic meds    *Normocytic anemia 2/2 CKD  Trend H/H  Consider need for transfusion     *Hypocalcemia likely 2/2 Denosumab vs secondary hyperparathyroidism   Prolonged QTc on ECG (566) on admission - improving ( on 3/25)  Continue Rocaltrol Phoslo   Calcium gluconate PO as per renal  Appreciate Renal recs   Trend calcium levels   Replete per renal recs    *Hypomagnesemia and Hyperkalemia   Resolved    *Urinary retention  Improved  Cont Flomax    *HTN  Continue Amlodipine, Metoprolol     *DMII  Cont Lantus 5 Units HS   Cont FS/ISS  Blood glu elevated likely due to steroids    *Depression  Cont Sertraline, Buproprion (home dose buproprion 100mg BID, pharmacy here only has 150mg XL)    *DVT ppx  UFH    *Goals of care: DNR/DNI

## 2020-04-02 NOTE — PROGRESS NOTE ADULT - SUBJECTIVE AND OBJECTIVE BOX
No distress    Vital Signs Last 24 Hrs  T(C): 36.8 (04-02-20 @ 04:59), Max: 36.8 (04-01-20 @ 16:22)  T(F): 98.2 (04-02-20 @ 04:59), Max: 98.2 (04-01-20 @ 16:22)  HR: 78 (04-02-20 @ 11:15) (8 - 92)  BP: 127/71 (04-02-20 @ 11:15) (127/71 - 165/85)  RR: 18 (04-02-20 @ 04:59) (16 - 18)  SpO2: 98% (04-02-20 @ 11:15) (95% - 98%)    Card S1S2  Lungs b/l air entry  Abd soft, NT, ND  Extr no edema                                                                          7.4    10.31 )-----------( 359      ( 02 Apr 2020 07:23 )             24.0     02 Apr 2020 07:23    148    |  111    |  80     ----------------------------<  301    5.0     |  26     |  2.97     Ca    6.6        02 Apr 2020 07:23  Phos  2.6       02 Apr 2020 07:23  Mg     1.6       02 Apr 2020 07:23    TPro  5.8    /  Alb  2.0    /  TBili  0.2    /  DBili  x      /  AST  28     /  ALT  31     /  AlkPhos  111    01 Apr 2020 07:24    LIVER FUNCTIONS - ( 01 Apr 2020 07:24 )  Alb: 2.0 g/dL / Pro: 5.8 g/dL / ALK PHOS: 111 U/L / ALT: 31 U/L / AST: 28 U/L / GGT: x           acetaminophen   Tablet .. 650 milliGRAM(s) Oral every 4 hours PRN  albuterol/ipratropium for Nebulization 3 milliLiter(s) Nebulizer every 6 hours  amLODIPine   Tablet 5 milliGRAM(s) Oral daily  aspirin enteric coated 81 milliGRAM(s) Oral daily  atorvastatin 10 milliGRAM(s) Oral at bedtime  budesonide 160 MICROgram(s)/formoterol 4.5 MICROgram(s) Inhaler 2 Puff(s) Inhalation two times a day  buPROPion XL . 150 milliGRAM(s) Oral daily  calcitriol   Capsule 0.5 MICROGram(s) Oral daily  calcium acetate 1334 milliGRAM(s) Oral three times a day with meals  calcium gluconate IVPB 2 Gram(s) IV Intermittent every 4 hours  dextrose 40% Gel 15 Gram(s) Oral once PRN  dextrose 5%. 1000 milliLiter(s) IV Continuous <Continuous>  dextrose 5%. 1000 milliLiter(s) IV Continuous <Continuous>  dextrose 50% Injectable 12.5 Gram(s) IV Push once  dextrose 50% Injectable 25 Gram(s) IV Push once  dextrose 50% Injectable 25 Gram(s) IV Push once  glucagon  Injectable 1 milliGRAM(s) IntraMuscular once PRN  guaiFENesin   Syrup  (Sugar-Free) 200 milliGRAM(s) Oral every 6 hours PRN  heparin  Injectable 5000 Unit(s) SubCutaneous every 8 hours  insulin glargine Injectable (LANTUS) 5 Unit(s) SubCutaneous at bedtime  insulin lispro (HumaLOG) corrective regimen sliding scale   SubCutaneous three times a day before meals  insulin lispro (HumaLOG) corrective regimen sliding scale   SubCutaneous at bedtime  metoprolol tartrate 25 milliGRAM(s) Oral two times a day  montelukast 10 milliGRAM(s) Oral daily  predniSONE   Tablet 20 milliGRAM(s) Oral daily  sertraline 100 milliGRAM(s) Oral daily    A/P:    Hx COPD, CKD 4 (Cr 2.8 - 5/18/19), metastatic prostate ca  COPD exacerbation, possibly lung mets (COVID negative)  Severe hypocalcemia w/QT prolongation on adm due to Denosumab (got on 3/12) exacerbated by bone disorder of advanced CKD w/low PTH and hyperphosphatemia, intermittent hypomagnesemia  Phoslo 2 tabs PO TID  Calcitriol PO  IV Ca Gluconate daily until Ca improves  Suppl Mg as needed  F/u Ca, iCa level, CMP, Mg, Phos daily  Avoid Denosumab in the future given CKD 4  Gentle d5w for hypernatremia  Encourage PO

## 2020-04-02 NOTE — PROGRESS NOTE ADULT - ASSESSMENT
86M w/ PMH of Dementia, DM, HTN, Depression, CKD4, COPD presents to the ED with difficulty breathing and progressive confusion. Admitted wBogdan calle COPD exacerbation.       *SOB likely 2/2 metabolic acidosis, COPD exacerbation - improving  Hx diastolic HF pEF- exacerbation does not seem likely  Afebrile, COVID 19-Negative  CXR: patchy infiltrates in RLL; will continue to monitor off abx for now; procalcitonin 0.86  Continue Albuterol, Symbicort, Nebs   Continue PO steroids  Cont Ativan 0.25mg q8 prn  Cont Guaifenesin   CXR on 3/29 with possible L pleural effusion.   Appreciate Pulm recs     *Metabolic encephalopathy: 2/2 metabolic acidosis/multiple electrolytes abnormalities likely 2/2 Denosumab & PABLITO  Improving  Cont to correct Lytes  Trend BMP  Aspiration precautions  Appreciate Renal recs     *PABLITO (Cr 2.8 in May 2019) on CKD stage 4   Cr slowly improving   Renal following  Avoid nephrotoxic meds    *Normocytic anemia 2/2 CKD  Trend H/H  Consider need for transfusion     *Hypocalcemia likely 2/2 Denosumab vs secondary hyperparathyroidism   Prolonged QTc on ECG (566) on admission - improving ( on 3/25)  Continue Rocaltrol Phoslo   Calcium gluconate PO as per renal  Appreciate Renal recs   Trend calcium levels   Replete per renal recs    *Hypomagnesemia and Hyperkalemia   Resolved    *Urinary retention  Improved  Cont Flomax    *HTN  Continue Amlodipine, Metoprolol     *DMII  Cont Lantus 5 Units HS   Cont FS/ISS  Blood glu elevated likely due to steroids    *Depression  Cont Sertraline, Buproprion (home dose buproprion 100mg BID, pharmacy here only has 150mg XL)    *DVT ppx  UFH    *Goals of care: DNR/DNI

## 2020-04-02 NOTE — PROGRESS NOTE ADULT - SUBJECTIVE AND OBJECTIVE BOX
SUBJECTIVE: 86M who presents with a chief complaint of Confusion, dyspnea, admitted for COPD exacerbation.    4/2: Pt seen and examined at bedside. Patient states that he has never been sick like this before. He wasnts to go home but is worried because he is so sick. Patient still has cough, bringing up occasional light grey sputum.     REVIEW OF SYSTEMS:  CONSTITUTIONAL: + weakness; no fevers or chills  RESPIRATORY: + mildly cough, wheezing, hemoptysis; No shortness of breath  CARDIOVASCULAR: No chest pain or palpitations  GASTROINTESTINAL: No abdominal or epigastric pain. No nausea, vomiting, or hematemesis; No diarrhea or constipation. No melena or hematochezia.  GENITOURINARY: No dysuria, frequency or hematuria  NEUROLOGICAL: No numbness or weakness  SKIN: No itching, burning, rashes, or lesions   All other review of systems is negative unless indicated above    Vital Signs Last 24 Hrs  T(C): 36.8 (02 Apr 2020 04:59), Max: 36.8 (01 Apr 2020 16:22)  T(F): 98.2 (02 Apr 2020 04:59), Max: 98.2 (01 Apr 2020 16:22)  HR: 78 (02 Apr 2020 11:15) (8 - 92)  BP: 127/71 (02 Apr 2020 11:15) (127/71 - 165/85)  BP(mean): --  RR: 18 (02 Apr 2020 04:59) (16 - 18)  SpO2: 98% (02 Apr 2020 11:15) (95% - 98%)    I&O's Summary    01 Apr 2020 07:01  -  02 Apr 2020 07:00  --------------------------------------------------------  IN: 820 mL / OUT: 0 mL / NET: 820 mL        CAPILLARY BLOOD GLUCOSE      POCT Blood Glucose.: 317 mg/dL (02 Apr 2020 11:41)  POCT Blood Glucose.: 140 mg/dL (02 Apr 2020 07:50)  POCT Blood Glucose.: 209 mg/dL (01 Apr 2020 21:13)  POCT Blood Glucose.: 242 mg/dL (01 Apr 2020 16:10)      PHYSICAL EXAM:  Constitutional: NAD, awake and alert, well-developed  HEENT: PERR, EOMI, Normal Hearing, MMM  Neck: Soft and supple, No LAD, No JVD  Respiratory: Breath sounds are clear bilaterally, No wheezing, rales or rhonchi  Cardiovascular: S1 and S2, regular rate and rhythm, no Murmurs, gallops or rubs  Gastrointestinal: Bowel Sounds present, soft, nontender, nondistended, no guarding, no rebound  Extremities: No peripheral edema  Vascular: 2+ peripheral pulses  Neurological: A/O x 3, no focal deficits  Musculoskeletal: 5/5 strength b/l upper and lower extremities  Skin: No rashes    MEDICATIONS:  MEDICATIONS  (STANDING):  albuterol/ipratropium for Nebulization 3 milliLiter(s) Nebulizer every 6 hours  amLODIPine   Tablet 5 milliGRAM(s) Oral daily  aspirin enteric coated 81 milliGRAM(s) Oral daily  atorvastatin 10 milliGRAM(s) Oral at bedtime  budesonide 160 MICROgram(s)/formoterol 4.5 MICROgram(s) Inhaler 2 Puff(s) Inhalation two times a day  buPROPion XL . 150 milliGRAM(s) Oral daily  calcitriol   Capsule 0.5 MICROGram(s) Oral daily  calcium acetate 1334 milliGRAM(s) Oral three times a day with meals  calcium gluconate IVPB 2 Gram(s) IV Intermittent every 4 hours  dextrose 5%. 1000 milliLiter(s) (50 mL/Hr) IV Continuous <Continuous>  dextrose 5%. 1000 milliLiter(s) (50 mL/Hr) IV Continuous <Continuous>  dextrose 50% Injectable 12.5 Gram(s) IV Push once  dextrose 50% Injectable 25 Gram(s) IV Push once  dextrose 50% Injectable 25 Gram(s) IV Push once  heparin  Injectable 5000 Unit(s) SubCutaneous every 8 hours  insulin glargine Injectable (LANTUS) 5 Unit(s) SubCutaneous at bedtime  insulin lispro (HumaLOG) corrective regimen sliding scale   SubCutaneous three times a day before meals  insulin lispro (HumaLOG) corrective regimen sliding scale   SubCutaneous at bedtime  metoprolol tartrate 25 milliGRAM(s) Oral two times a day  montelukast 10 milliGRAM(s) Oral daily  predniSONE   Tablet 20 milliGRAM(s) Oral daily  sertraline 100 milliGRAM(s) Oral daily    MEDICATIONS  (PRN):  acetaminophen   Tablet .. 650 milliGRAM(s) Oral every 4 hours PRN Mild Pain (1 - 3)  dextrose 40% Gel 15 Gram(s) Oral once PRN Blood Glucose LESS THAN 70 milliGRAM(s)/deciliter  glucagon  Injectable 1 milliGRAM(s) IntraMuscular once PRN Glucose LESS THAN 70 milligrams/deciliter  guaiFENesin   Syrup  (Sugar-Free) 200 milliGRAM(s) Oral every 6 hours PRN Cough      LABS: All Labs Reviewed:                        7.4    10.31 )-----------( 359      ( 02 Apr 2020 07:23 )             24.0     04-02    148<H>  |  111<H>  |  80<H>  ----------------------------<  301<H>  5.0   |  26  |  2.97<H>    Ca    6.6<L>      02 Apr 2020 07:23  Phos  2.6     04-02  Mg     1.6     04-02    TPro  5.8<L>  /  Alb  2.0<L>  /  TBili  0.2  /  DBili  x   /  AST  28  /  ALT  31  /  AlkPhos  111  04-01          Blood Culture:     RADIOLOGY/EKG:    DVT PPX:    ADVANCED DIRECTIVE:    DISPOSITION: SUBJECTIVE: 86M who presents with a chief complaint of Confusion, dyspnea, admitted for COPD exacerbation.    4/2: Pt seen and examined at bedside. Patient states that he has never been sick like this before. He wasnts to go home but is worried because he is so sick. Patient still has cough, bringing up occasional light grey sputum.     REVIEW OF SYSTEMS:  CONSTITUTIONAL: + weakness; no fevers or chills  RESPIRATORY: + mildly cough; no wheezing, hemoptysis; No shortness of breath  CARDIOVASCULAR: No chest pain or palpitations  GASTROINTESTINAL: No abdominal or epigastric pain. No nausea, vomiting, or hematemesis; No diarrhea or constipation. No melena or hematochezia.  GENITOURINARY: No dysuria, frequency or hematuria  NEUROLOGICAL: No numbness or weakness  SKIN: No itching, burning, rashes, or lesions   All other review of systems is negative unless indicated above    Vital Signs Last 24 Hrs  T(C): 36.8 (02 Apr 2020 04:59), Max: 36.8 (01 Apr 2020 16:22)  T(F): 98.2 (02 Apr 2020 04:59), Max: 98.2 (01 Apr 2020 16:22)  HR: 78 (02 Apr 2020 11:15) (8 - 92)  BP: 127/71 (02 Apr 2020 11:15) (127/71 - 165/85)  RR: 18 (02 Apr 2020 04:59) (16 - 18)  SpO2: 98% (02 Apr 2020 11:15) (95% - 98%)    I&O's Summary    01 Apr 2020 07:01  -  02 Apr 2020 07:00  --------------------------------------------------------  IN: 820 mL / OUT: 0 mL / NET: 820 mL        CAPILLARY BLOOD GLUCOSE      POCT Blood Glucose.: 317 mg/dL (02 Apr 2020 11:41)  POCT Blood Glucose.: 140 mg/dL (02 Apr 2020 07:50)  POCT Blood Glucose.: 209 mg/dL (01 Apr 2020 21:13)  POCT Blood Glucose.: 242 mg/dL (01 Apr 2020 16:10)      PHYSICAL EXAM:  Constitutional: NAD, awake and alert, frail  HEENT: PERR, EOMI, hard of hearing, MMM,  Respiratory: + occassional rhonchi improved with cough  Cardiovascular: S1 and S2, regular rate and rhythm, no Murmurs, gallops or rubs  Gastrointestinal: Bowel Sounds present, soft, nontender, nondistended, no guarding, no rebound  Extremities: No peripheral edema  Vascular: 2+ peripheral pulses  Neurological: A/O x 3, no focal deficits  Musculoskeletal: 4-/5 strength b/l upper and lower extremities  Skin: No rashes    MEDICATIONS:  MEDICATIONS  (STANDING):  albuterol/ipratropium for Nebulization 3 milliLiter(s) Nebulizer every 6 hours  amLODIPine   Tablet 5 milliGRAM(s) Oral daily  aspirin enteric coated 81 milliGRAM(s) Oral daily  atorvastatin 10 milliGRAM(s) Oral at bedtime  budesonide 160 MICROgram(s)/formoterol 4.5 MICROgram(s) Inhaler 2 Puff(s) Inhalation two times a day  buPROPion XL . 150 milliGRAM(s) Oral daily  calcitriol   Capsule 0.5 MICROGram(s) Oral daily  calcium acetate 1334 milliGRAM(s) Oral three times a day with meals  calcium gluconate IVPB 2 Gram(s) IV Intermittent every 4 hours  dextrose 5%. 1000 milliLiter(s) (50 mL/Hr) IV Continuous <Continuous>  dextrose 5%. 1000 milliLiter(s) (50 mL/Hr) IV Continuous <Continuous>  dextrose 50% Injectable 12.5 Gram(s) IV Push once  dextrose 50% Injectable 25 Gram(s) IV Push once  dextrose 50% Injectable 25 Gram(s) IV Push once  heparin  Injectable 5000 Unit(s) SubCutaneous every 8 hours  insulin glargine Injectable (LANTUS) 5 Unit(s) SubCutaneous at bedtime  insulin lispro (HumaLOG) corrective regimen sliding scale   SubCutaneous three times a day before meals  insulin lispro (HumaLOG) corrective regimen sliding scale   SubCutaneous at bedtime  metoprolol tartrate 25 milliGRAM(s) Oral two times a day  montelukast 10 milliGRAM(s) Oral daily  predniSONE   Tablet 20 milliGRAM(s) Oral daily  sertraline 100 milliGRAM(s) Oral daily    MEDICATIONS  (PRN):  acetaminophen   Tablet .. 650 milliGRAM(s) Oral every 4 hours PRN Mild Pain (1 - 3)  dextrose 40% Gel 15 Gram(s) Oral once PRN Blood Glucose LESS THAN 70 milliGRAM(s)/deciliter  glucagon  Injectable 1 milliGRAM(s) IntraMuscular once PRN Glucose LESS THAN 70 milligrams/deciliter  guaiFENesin   Syrup  (Sugar-Free) 200 milliGRAM(s) Oral every 6 hours PRN Cough      LABS: All Labs Reviewed:                        7.4    10.31 )-----------( 359      ( 02 Apr 2020 07:23 )             24.0     04-02    148<H>  |  111<H>  |  80<H>  ----------------------------<  301<H>  5.0   |  26  |  2.97<H>    Ca    6.6<L>      02 Apr 2020 07:23  Phos  2.6     04-02  Mg     1.6     04-02    Calcium, Ionized in AM (04.02.20 @ 07:23)    Calcium, Ionized: 0.98 mmoL/L

## 2020-04-02 NOTE — PROGRESS NOTE ADULT - SUBJECTIVE AND OBJECTIVE BOX
Follow-up Pulm/pall Progress Note    Rod Avalos MD  [phone]    No new respiratory events overnight.  Denies SOB/CP. Less cough today .  Some visual disturbances.    Medications:  Vital Signs Last 24 Hrs  T(C): 36.6 (02 Apr 2020 16:22), Max: 36.8 (02 Apr 2020 04:59)  T(F): 97.9 (02 Apr 2020 16:22), Max: 98.2 (02 Apr 2020 04:59)  HR: 66 (02 Apr 2020 16:22) (66 - 92)  BP: 150/79 (02 Apr 2020 16:22) (127/71 - 165/85)  BP(mean): --  RR: 18 (02 Apr 2020 16:22) (18 - 18)  SpO2: 99% (02 Apr 2020 16:22) (96% - 99%)          04-01 @ 07:01  -  04-02 @ 07:00  --------------------------------------------------------  IN: 820 mL / OUT: 0 mL / NET: 820 mL        LABS:                        7.4    10.31 )-----------( 359      ( 02 Apr 2020 07:23 )             24.0     04-02    148<H>  |  111<H>  |  80<H>  ----------------------------<  301<H>  5.0   |  26  |  2.97<H>    Ca    6.6<L>      02 Apr 2020 07:23  Phos  2.6     04-02  Mg     1.6     04-02    TPro  5.8<L>  /  Alb  2.0<L>  /  TBili  0.2  /  DBili  x   /  AST  28  /  ALT  31  /  AlkPhos  111  04-01              CULTURES:        Physical Examination:  PULM: Clear to auscultation bilaterally, no significant sputum production  CVS: Regular rate and rhythm, no murmurs, rubs, or gallops  ABD: Soft, non-tender  EXT:  No clubbing, cyanosis, or edema    RADIOLOGY REVIEWED  CXR:    CT chest:    TTE:

## 2020-04-03 LAB
ANION GAP SERPL CALC-SCNC: 10 MMOL/L — SIGNIFICANT CHANGE UP (ref 5–17)
BUN SERPL-MCNC: 75 MG/DL — HIGH (ref 7–23)
CA-I BLD-SCNC: 1 MMOL/L — LOW (ref 1.12–1.3)
CALCIUM SERPL-MCNC: 7.1 MG/DL — LOW (ref 8.4–10.5)
CHLORIDE SERPL-SCNC: 110 MMOL/L — HIGH (ref 96–108)
CO2 SERPL-SCNC: 24 MMOL/L — SIGNIFICANT CHANGE UP (ref 22–31)
CREAT SERPL-MCNC: 2.71 MG/DL — HIGH (ref 0.5–1.3)
GLUCOSE BLDC GLUCOMTR-MCNC: 169 MG/DL — HIGH (ref 70–99)
GLUCOSE BLDC GLUCOMTR-MCNC: 187 MG/DL — HIGH (ref 70–99)
GLUCOSE BLDC GLUCOMTR-MCNC: 225 MG/DL — HIGH (ref 70–99)
GLUCOSE BLDC GLUCOMTR-MCNC: 306 MG/DL — HIGH (ref 70–99)
GLUCOSE SERPL-MCNC: 283 MG/DL — HIGH (ref 70–99)
HCT VFR BLD CALC: 24.7 % — LOW (ref 39–50)
HGB BLD-MCNC: 7.5 G/DL — LOW (ref 13–17)
MAGNESIUM SERPL-MCNC: 1.5 MG/DL — LOW (ref 1.6–2.6)
MCHC RBC-ENTMCNC: 28.2 PG — SIGNIFICANT CHANGE UP (ref 27–34)
MCHC RBC-ENTMCNC: 30.4 GM/DL — LOW (ref 32–36)
MCV RBC AUTO: 92.9 FL — SIGNIFICANT CHANGE UP (ref 80–100)
NRBC # BLD: 0 /100 WBCS — SIGNIFICANT CHANGE UP (ref 0–0)
PLATELET # BLD AUTO: 354 K/UL — SIGNIFICANT CHANGE UP (ref 150–400)
POTASSIUM SERPL-MCNC: 4.6 MMOL/L — SIGNIFICANT CHANGE UP (ref 3.5–5.3)
POTASSIUM SERPL-SCNC: 4.6 MMOL/L — SIGNIFICANT CHANGE UP (ref 3.5–5.3)
RBC # BLD: 2.66 M/UL — LOW (ref 4.2–5.8)
RBC # FLD: 14.2 % — SIGNIFICANT CHANGE UP (ref 10.3–14.5)
SODIUM SERPL-SCNC: 144 MMOL/L — SIGNIFICANT CHANGE UP (ref 135–145)
WBC # BLD: 10.03 K/UL — SIGNIFICANT CHANGE UP (ref 3.8–10.5)
WBC # FLD AUTO: 10.03 K/UL — SIGNIFICANT CHANGE UP (ref 3.8–10.5)

## 2020-04-03 PROCEDURE — 73120 X-RAY EXAM OF HAND: CPT | Mod: 26,RT

## 2020-04-03 PROCEDURE — 71045 X-RAY EXAM CHEST 1 VIEW: CPT | Mod: 26

## 2020-04-03 RX ORDER — CALCIUM GLUCONATE 100 MG/ML
2 VIAL (ML) INTRAVENOUS EVERY 4 HOURS
Refills: 0 | Status: COMPLETED | OUTPATIENT
Start: 2020-04-03 | End: 2020-04-03

## 2020-04-03 RX ORDER — MAGNESIUM SULFATE 500 MG/ML
2 VIAL (ML) INJECTION ONCE
Refills: 0 | Status: DISCONTINUED | OUTPATIENT
Start: 2020-04-03 | End: 2020-04-03

## 2020-04-03 RX ORDER — MAGNESIUM SULFATE 500 MG/ML
1 VIAL (ML) INJECTION
Refills: 0 | Status: COMPLETED | OUTPATIENT
Start: 2020-04-03 | End: 2020-04-03

## 2020-04-03 RX ORDER — FUROSEMIDE 40 MG
20 TABLET ORAL ONCE
Refills: 0 | Status: DISCONTINUED | OUTPATIENT
Start: 2020-04-03 | End: 2020-04-03

## 2020-04-03 RX ORDER — INSULIN GLARGINE 100 [IU]/ML
7 INJECTION, SOLUTION SUBCUTANEOUS AT BEDTIME
Refills: 0 | Status: DISCONTINUED | OUTPATIENT
Start: 2020-04-03 | End: 2020-04-09

## 2020-04-03 RX ADMIN — Medication 3 MILLILITER(S): at 15:00

## 2020-04-03 RX ADMIN — Medication 81 MILLIGRAM(S): at 12:21

## 2020-04-03 RX ADMIN — Medication 1334 MILLIGRAM(S): at 17:25

## 2020-04-03 RX ADMIN — INSULIN GLARGINE 7 UNIT(S): 100 INJECTION, SOLUTION SUBCUTANEOUS at 21:47

## 2020-04-03 RX ADMIN — CALCITRIOL 0.5 MICROGRAM(S): 0.5 CAPSULE ORAL at 12:21

## 2020-04-03 RX ADMIN — SCOPALAMINE 1 PATCH: 1 PATCH, EXTENDED RELEASE TRANSDERMAL at 19:00

## 2020-04-03 RX ADMIN — Medication 200 MILLIGRAM(S): at 08:15

## 2020-04-03 RX ADMIN — Medication 2: at 12:21

## 2020-04-03 RX ADMIN — HEPARIN SODIUM 5000 UNIT(S): 5000 INJECTION INTRAVENOUS; SUBCUTANEOUS at 21:45

## 2020-04-03 RX ADMIN — Medication 25 MILLIGRAM(S): at 05:43

## 2020-04-03 RX ADMIN — Medication 200 GRAM(S): at 16:28

## 2020-04-03 RX ADMIN — BUDESONIDE AND FORMOTEROL FUMARATE DIHYDRATE 2 PUFF(S): 160; 4.5 AEROSOL RESPIRATORY (INHALATION) at 21:56

## 2020-04-03 RX ADMIN — Medication 1334 MILLIGRAM(S): at 08:15

## 2020-04-03 RX ADMIN — SCOPALAMINE 1 PATCH: 1 PATCH, EXTENDED RELEASE TRANSDERMAL at 07:36

## 2020-04-03 RX ADMIN — Medication 1: at 17:21

## 2020-04-03 RX ADMIN — SERTRALINE 100 MILLIGRAM(S): 25 TABLET, FILM COATED ORAL at 12:21

## 2020-04-03 RX ADMIN — HEPARIN SODIUM 5000 UNIT(S): 5000 INJECTION INTRAVENOUS; SUBCUTANEOUS at 05:44

## 2020-04-03 RX ADMIN — Medication 100 GRAM(S): at 15:35

## 2020-04-03 RX ADMIN — SODIUM CHLORIDE 50 MILLILITER(S): 9 INJECTION, SOLUTION INTRAVENOUS at 08:28

## 2020-04-03 RX ADMIN — Medication 25 MILLIGRAM(S): at 17:25

## 2020-04-03 RX ADMIN — AMLODIPINE BESYLATE 5 MILLIGRAM(S): 2.5 TABLET ORAL at 05:43

## 2020-04-03 RX ADMIN — ATORVASTATIN CALCIUM 10 MILLIGRAM(S): 80 TABLET, FILM COATED ORAL at 21:45

## 2020-04-03 RX ADMIN — Medication 20 MILLIGRAM(S): at 05:43

## 2020-04-03 RX ADMIN — Medication 3 MILLILITER(S): at 04:37

## 2020-04-03 RX ADMIN — MONTELUKAST 10 MILLIGRAM(S): 4 TABLET, CHEWABLE ORAL at 12:21

## 2020-04-03 RX ADMIN — BUPROPION HYDROCHLORIDE 150 MILLIGRAM(S): 150 TABLET, EXTENDED RELEASE ORAL at 12:21

## 2020-04-03 RX ADMIN — Medication 1334 MILLIGRAM(S): at 12:21

## 2020-04-03 RX ADMIN — Medication 4: at 08:27

## 2020-04-03 RX ADMIN — BUDESONIDE AND FORMOTEROL FUMARATE DIHYDRATE 2 PUFF(S): 160; 4.5 AEROSOL RESPIRATORY (INHALATION) at 09:05

## 2020-04-03 RX ADMIN — Medication 100 GRAM(S): at 14:25

## 2020-04-03 RX ADMIN — Medication 200 GRAM(S): at 17:25

## 2020-04-03 RX ADMIN — HEPARIN SODIUM 5000 UNIT(S): 5000 INJECTION INTRAVENOUS; SUBCUTANEOUS at 12:21

## 2020-04-03 RX ADMIN — Medication 200 MILLIGRAM(S): at 17:25

## 2020-04-03 RX ADMIN — Medication 3 MILLILITER(S): at 22:02

## 2020-04-03 RX ADMIN — Medication 3 MILLILITER(S): at 09:04

## 2020-04-03 NOTE — PROGRESS NOTE ADULT - SUBJECTIVE AND OBJECTIVE BOX
SUBJECTIVE:     REVIEW OF SYSTEMS:  CONSTITUTIONAL: No weakness, fevers or chills  EYES/ENT: No visual changes;  No vertigo or throat pain   NECK: No pain or stiffness  RESPIRATORY: No cough, wheezing, hemoptysis; No shortness of breath  CARDIOVASCULAR: No chest pain or palpitations  GASTROINTESTINAL: No abdominal or epigastric pain. No nausea, vomiting, or hematemesis; No diarrhea or constipation. No melena or hematochezia.  GENITOURINARY: No dysuria, frequency or hematuria  NEUROLOGICAL: No numbness or weakness  SKIN: No itching, burning, rashes, or lesions   All other review of systems is negative unless indicated above    Vital Signs Last 24 Hrs  T(C): 36.8 (03 Apr 2020 17:00), Max: 37 (02 Apr 2020 22:27)  T(F): 98.2 (03 Apr 2020 17:00), Max: 98.6 (02 Apr 2020 22:27)  HR: 87 (03 Apr 2020 17:00) (79 - 113)  BP: 177/80 (03 Apr 2020 17:00) (151/76 - 177/80)  BP(mean): --  RR: 15 (03 Apr 2020 17:00) (15 - 16)  SpO2: 99% (03 Apr 2020 17:00) (95% - 99%)    I&O's Summary      CAPILLARY BLOOD GLUCOSE      POCT Blood Glucose.: 187 mg/dL (03 Apr 2020 16:22)  POCT Blood Glucose.: 225 mg/dL (03 Apr 2020 11:30)  POCT Blood Glucose.: 306 mg/dL (03 Apr 2020 08:09)  POCT Blood Glucose.: 304 mg/dL (02 Apr 2020 22:24)      PHYSICAL EXAM:  Constitutional: NAD, awake and alert, well-developed  HEENT: PERR, EOMI, Normal Hearing, MMM  Neck: Soft and supple, No LAD, No JVD  Respiratory: Breath sounds are clear bilaterally, No wheezing, rales or rhonchi  Cardiovascular: S1 and S2, regular rate and rhythm, no Murmurs, gallops or rubs  Gastrointestinal: Bowel Sounds present, soft, nontender, nondistended, no guarding, no rebound  Extremities: No peripheral edema  Vascular: 2+ peripheral pulses  Neurological: A/O x 3, no focal deficits  Musculoskeletal: 5/5 strength b/l upper and lower extremities  Skin: No rashes    MEDICATIONS:  MEDICATIONS  (STANDING):  albuterol/ipratropium for Nebulization 3 milliLiter(s) Nebulizer every 6 hours  amLODIPine   Tablet 5 milliGRAM(s) Oral daily  aspirin enteric coated 81 milliGRAM(s) Oral daily  atorvastatin 10 milliGRAM(s) Oral at bedtime  budesonide 160 MICROgram(s)/formoterol 4.5 MICROgram(s) Inhaler 2 Puff(s) Inhalation two times a day  buPROPion XL . 150 milliGRAM(s) Oral daily  calcitriol   Capsule 0.5 MICROGram(s) Oral daily  calcium acetate 1334 milliGRAM(s) Oral three times a day with meals  dextrose 5%. 1000 milliLiter(s) (50 mL/Hr) IV Continuous <Continuous>  dextrose 50% Injectable 12.5 Gram(s) IV Push once  dextrose 50% Injectable 25 Gram(s) IV Push once  dextrose 50% Injectable 25 Gram(s) IV Push once  heparin  Injectable 5000 Unit(s) SubCutaneous every 8 hours  insulin glargine Injectable (LANTUS) 7 Unit(s) SubCutaneous at bedtime  insulin lispro (HumaLOG) corrective regimen sliding scale   SubCutaneous three times a day before meals  insulin lispro (HumaLOG) corrective regimen sliding scale   SubCutaneous at bedtime  metoprolol tartrate 25 milliGRAM(s) Oral two times a day  montelukast 10 milliGRAM(s) Oral daily  sertraline 100 milliGRAM(s) Oral daily    MEDICATIONS  (PRN):  acetaminophen   Tablet .. 650 milliGRAM(s) Oral every 4 hours PRN Mild Pain (1 - 3)  dextrose 40% Gel 15 Gram(s) Oral once PRN Blood Glucose LESS THAN 70 milliGRAM(s)/deciliter  glucagon  Injectable 1 milliGRAM(s) IntraMuscular once PRN Glucose LESS THAN 70 milligrams/deciliter  guaiFENesin   Syrup  (Sugar-Free) 200 milliGRAM(s) Oral every 6 hours PRN Cough      LABS: All Labs Reviewed:                        7.5    10.03 )-----------( 354      ( 03 Apr 2020 07:14 )             24.7     04-03    144  |  110<H>  |  75<H>  ----------------------------<  283<H>  4.6   |  24  |  2.71<H>    Ca    7.1<L>      03 Apr 2020 07:14  Phos  2.6     04-02  Mg     1.5     04-03            Blood Culture:     RADIOLOGY/EKG:    DVT PPX:    ADVANCED DIRECTIVE:    DISPOSITION: SUBJECTIVE: This morning patient states that he feels weak. States that he wasn't able to sleep well because of his coughing. He is also complaining of pain at his finger (Xray did not show fracture).     REVIEW OF SYSTEMS:  CONSTITUTIONAL: +weakness; no fevers or chills  EYES/ENT: blurred vision; No vertigo or throat pain   RESPIRATORY: + cough;no  wheezing, hemoptysis; +shortness of breath  CARDIOVASCULAR: No chest pain or palpitations  GASTROINTESTINAL: No abdominal or epigastric pain. No nausea, vomiting, or hematemesis; No diarrhea or constipation. No melena or hematochezia.  GENITOURINARY: No dysuria, frequency or hematuria  NEUROLOGICAL: No numbness or weakness  SKIN: No itching, burning, rashes, or lesions   All other review of systems is negative unless indicated above    Vital Signs Last 24 Hrs  T(C): 36.8 (03 Apr 2020 17:00), Max: 37 (02 Apr 2020 22:27)  T(F): 98.2 (03 Apr 2020 17:00), Max: 98.6 (02 Apr 2020 22:27)  HR: 87 (03 Apr 2020 17:00) (79 - 113)  BP: 177/80 (03 Apr 2020 17:00) (151/76 - 177/80)  BP(mean): --  RR: 15 (03 Apr 2020 17:00) (15 - 16)  SpO2: 99% (03 Apr 2020 17:00) (95% - 99%)    I&O's Summary      CAPILLARY BLOOD GLUCOSE      POCT Blood Glucose.: 187 mg/dL (03 Apr 2020 16:22)  POCT Blood Glucose.: 225 mg/dL (03 Apr 2020 11:30)  POCT Blood Glucose.: 306 mg/dL (03 Apr 2020 08:09)  POCT Blood Glucose.: 304 mg/dL (02 Apr 2020 22:24)      PHYSICAL EXAM:  Constitutional: NAD, awake and alert, frail  HEENT: PERR, EOMI, hard of hearing, MMM,  Respiratory: +diminished breath sounds bilaterally  Cardiovascular: S1 and S2, regular rate and rhythm, no Murmurs, gallops or rubs  Gastrointestinal: Bowel Sounds present, soft, nontender, nondistended, no guarding, no rebound  Extremities: No peripheral edema  Vascular: 2+ peripheral pulses  Neurological: A/O x 3, no focal deficits  Musculoskeletal: 4-/5 strength b/l upper and lower extremities  Skin: No rashes    MEDICATIONS:  MEDICATIONS  (STANDING):  albuterol/ipratropium for Nebulization 3 milliLiter(s) Nebulizer every 6 hours  amLODIPine   Tablet 5 milliGRAM(s) Oral daily  aspirin enteric coated 81 milliGRAM(s) Oral daily  atorvastatin 10 milliGRAM(s) Oral at bedtime  budesonide 160 MICROgram(s)/formoterol 4.5 MICROgram(s) Inhaler 2 Puff(s) Inhalation two times a day  buPROPion XL . 150 milliGRAM(s) Oral daily  calcitriol   Capsule 0.5 MICROGram(s) Oral daily  calcium acetate 1334 milliGRAM(s) Oral three times a day with meals  dextrose 5%. 1000 milliLiter(s) (50 mL/Hr) IV Continuous <Continuous>  dextrose 50% Injectable 12.5 Gram(s) IV Push once  dextrose 50% Injectable 25 Gram(s) IV Push once  dextrose 50% Injectable 25 Gram(s) IV Push once  heparin  Injectable 5000 Unit(s) SubCutaneous every 8 hours  insulin glargine Injectable (LANTUS) 7 Unit(s) SubCutaneous at bedtime  insulin lispro (HumaLOG) corrective regimen sliding scale   SubCutaneous three times a day before meals  insulin lispro (HumaLOG) corrective regimen sliding scale   SubCutaneous at bedtime  metoprolol tartrate 25 milliGRAM(s) Oral two times a day  montelukast 10 milliGRAM(s) Oral daily  sertraline 100 milliGRAM(s) Oral daily    MEDICATIONS  (PRN):  acetaminophen   Tablet .. 650 milliGRAM(s) Oral every 4 hours PRN Mild Pain (1 - 3)  dextrose 40% Gel 15 Gram(s) Oral once PRN Blood Glucose LESS THAN 70 milliGRAM(s)/deciliter  glucagon  Injectable 1 milliGRAM(s) IntraMuscular once PRN Glucose LESS THAN 70 milligrams/deciliter  guaiFENesin   Syrup  (Sugar-Free) 200 milliGRAM(s) Oral every 6 hours PRN Cough      LABS: All Labs Reviewed:                        7.5    10.03 )-----------( 354      ( 03 Apr 2020 07:14 )             24.7     04-03    144  |  110<H>  |  75<H>  ----------------------------<  283<H>  4.6   |  24  |  2.71<H>    Ca    7.1<L>      03 Apr 2020 07:14  Phos  2.6     04-02  Mg     1.5     04-03          < from: Xray Chest 1 View- PORTABLE-Routine (04.03.20 @ 09:02) >  FINDINGS:     Cardiac silhouette not well evaluated. Left pleural effusion. Bibasilar linear opacities. Lungs otherwise clear. Question sclerotic foci within the right first rib and the left clavicle.    IMPRESSION:   Left pleural effusion. Bibasilar linear opacities may represent atelectasis.    Question sclerotic foci within the right first rib and the left clavicle. CT chest may be helpful for complete evaluation.    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 There is joint space narrowing and osteophyte formation. No acutefracture or dislocation.    Impression: Advanced degenerative changes. No acute fracture      < end of copied text >

## 2020-04-03 NOTE — CHART NOTE - NSCHARTNOTEFT_GEN_A_CORE
Spoke with daughter Lily Horner to give update concerning father's condition. Informed her that nephrology has cleared patient to d/c home as his calcium has improved. However patient still has persistent cough and requiring oxygen. Also informed family that the recommendation of the physical therapy department is that Mr. Chung be discharged to [facility]. Explained that Mr. Chung would need devices such as a edilberto lift and sliding board to be able to go home. I explained that family members could get injured if they try to use DME without training. Ms. Horner insists that her family will be able to take care of her father at home. At this time there are Ms. Horner and her two siblings would be able to help take care of her father. They also have an aide that comes into help with father's ADL's for 4 hours/day and they are willing to increase hours that the aide will be there. They can not afford a 24 hour aide.     Ms. Horner said at baseline Mr. Chung used walker to ambulate and would spend most of the day in bed because of SOB. States that family has been taking care of Mr. Chung prior to his hospitalization.

## 2020-04-03 NOTE — PROGRESS NOTE ADULT - SUBJECTIVE AND OBJECTIVE BOX
No distress    Vital Signs Last 24 Hrs  T(C): 36.5 (04-03-20 @ 05:41), Max: 37 (04-02-20 @ 22:27)  T(F): 97.7 (04-03-20 @ 05:41), Max: 98.6 (04-02-20 @ 22:27)  HR: 90 (04-03-20 @ 09:06) (66 - 95)  BP: 155/83 (04-03-20 @ 05:41) (150/79 - 155/83)  RR: 16 (04-03-20 @ 05:41) (16 - 18)  SpO2: 96% (04-03-20 @ 09:06) (96% - 99%)    Card S1S2  Lungs b/l air entry  Abd soft, NT, ND  Extr no edema                                                                                   7.5    10.03 )-----------( 354      ( 03 Apr 2020 07:14 )             24.7     03 Apr 2020 07:14    144    |  110    |  75     ----------------------------<  283    4.6     |  24     |  2.71     Ca    7.1        03 Apr 2020 07:14  Phos  2.6       02 Apr 2020 07:23  Mg     1.5       03 Apr 2020 07:14    acetaminophen   Tablet .. 650 milliGRAM(s) Oral every 4 hours PRN  albuterol/ipratropium for Nebulization 3 milliLiter(s) Nebulizer every 6 hours  amLODIPine   Tablet 5 milliGRAM(s) Oral daily  aspirin enteric coated 81 milliGRAM(s) Oral daily  atorvastatin 10 milliGRAM(s) Oral at bedtime  budesonide 160 MICROgram(s)/formoterol 4.5 MICROgram(s) Inhaler 2 Puff(s) Inhalation two times a day  buPROPion XL . 150 milliGRAM(s) Oral daily  calcitriol   Capsule 0.5 MICROGram(s) Oral daily  calcium acetate 1334 milliGRAM(s) Oral three times a day with meals  calcium gluconate IVPB 2 Gram(s) IV Intermittent every 4 hours  dextrose 40% Gel 15 Gram(s) Oral once PRN  dextrose 5%. 1000 milliLiter(s) IV Continuous <Continuous>  dextrose 50% Injectable 12.5 Gram(s) IV Push once  dextrose 50% Injectable 25 Gram(s) IV Push once  dextrose 50% Injectable 25 Gram(s) IV Push once  glucagon  Injectable 1 milliGRAM(s) IntraMuscular once PRN  guaiFENesin   Syrup  (Sugar-Free) 200 milliGRAM(s) Oral every 6 hours PRN  heparin  Injectable 5000 Unit(s) SubCutaneous every 8 hours  insulin glargine Injectable (LANTUS) 7 Unit(s) SubCutaneous at bedtime  insulin lispro (HumaLOG) corrective regimen sliding scale   SubCutaneous three times a day before meals  insulin lispro (HumaLOG) corrective regimen sliding scale   SubCutaneous at bedtime  magnesium sulfate  IVPB 2 Gram(s) IV Intermittent once  metoprolol tartrate 25 milliGRAM(s) Oral two times a day  montelukast 10 milliGRAM(s) Oral daily  sertraline 100 milliGRAM(s) Oral daily    A/P:    Hx COPD, CKD 4 (Cr 2.8 - 5/18/19), metastatic prostate ca  COPD exacerbation, possibly lung mets (COVID negative)  Severe hypocalcemia w/QT prolongation on adm due to Denosumab (got on 3/12) exacerbated by bone disorder of advanced CKD w/low PTH and hyperphosphatemia, intermittent hypomagnesemia  Phoslo 2 tabs PO TID  Calcitriol PO  IV Ca Gluconate as needed  Suppl Mg   F/u Ca, iCa level, CMP, Mg in am  Avoid Denosumab in the future given CKD 4  If Ca is better in am, can be d/c-d on current meds from renal pov  DNR/I    [phone]

## 2020-04-03 NOTE — PROGRESS NOTE ADULT - ASSESSMENT
86M w/ PMH of Dementia, DM, HTN, Depression, CKD4, COPD presents to the ED with difficulty breathing and progressive confusion. Admitted delia calle COPD exacerbation.       *SOB likely 2/2 metabolic acidosis, COPD exacerbation - improving  Hx diastolic HF pEF- exacerbation does not seem likely  Afebrile, COVID 19-Negative  CXR: patchy infiltrates in RLL; will continue to monitor off abx for now; procalcitonin 0.86  Continue Albuterol, Symbicort, Nebs   Continue PO steroids  Cont Ativan 0.25mg q8 prn  Cont Guaifenesin   CXR on 3/29 with possible L pleural effusion.   Appreciate Pulm recs   -Lasix given 20mg x1 to treat pleural effusions noted on CXR done today    *Metabolic encephalopathy: 2/2 metabolic acidosis/multiple electrolytes abnormalities likely 2/2 Denosumab & PABLITO  Improving  Cont to correct Lytes  Trend BMP  Aspiration precautions  Appreciate Renal recs     *PABLITO (Cr 2.8 in May 2019) on CKD stage 4   Cr slowly improving   Renal following  Avoid nephrotoxic meds    *Normocytic anemia 2/2 CKD  stable    *Hypocalcemia likely 2/2 Denosumab vs secondary hyperparathyroidism   Prolonged QTc on ECG (566) on admission - improving ( on 3/25)  Continue Rocaltrol Phoslo   Calcium gluconate PO as per renal  Appreciate Renal recs   Trend calcium levels   Replete per renal recs    *Hypomagnesemia and Hyperkalemia   Resolved    *Urinary retention  Improved  Cont Flomax    *HTN  Continue Amlodipine, Metoprolol     *DMII  Cont Lantus 7 Units HS   Cont FS/ISS  Blood glu elevated likely due to steroids    *Depression  Cont Sertraline, Buproprion (home dose buproprion 100mg BID, pharmacy here only has 150mg XL)    *DVT ppx  UFH    *Goals of care: DNR/DNI

## 2020-04-04 ENCOUNTER — TRANSCRIPTION ENCOUNTER (OUTPATIENT)
Age: 85
End: 2020-04-04

## 2020-04-04 LAB
ANION GAP SERPL CALC-SCNC: 10 MMOL/L — SIGNIFICANT CHANGE UP (ref 5–17)
BUN SERPL-MCNC: 69 MG/DL — HIGH (ref 7–23)
CA-I BLD-SCNC: 1.08 MMOL/L — LOW (ref 1.12–1.3)
CALCIUM SERPL-MCNC: 7.6 MG/DL — LOW (ref 8.4–10.5)
CHLORIDE SERPL-SCNC: 107 MMOL/L — SIGNIFICANT CHANGE UP (ref 96–108)
CO2 SERPL-SCNC: 24 MMOL/L — SIGNIFICANT CHANGE UP (ref 22–31)
CREAT SERPL-MCNC: 2.61 MG/DL — HIGH (ref 0.5–1.3)
GLUCOSE BLDC GLUCOMTR-MCNC: 116 MG/DL — HIGH (ref 70–99)
GLUCOSE BLDC GLUCOMTR-MCNC: 193 MG/DL — HIGH (ref 70–99)
GLUCOSE BLDC GLUCOMTR-MCNC: 195 MG/DL — HIGH (ref 70–99)
GLUCOSE BLDC GLUCOMTR-MCNC: 236 MG/DL — HIGH (ref 70–99)
GLUCOSE SERPL-MCNC: 182 MG/DL — HIGH (ref 70–99)
POTASSIUM SERPL-MCNC: 4.9 MMOL/L — SIGNIFICANT CHANGE UP (ref 3.5–5.3)
POTASSIUM SERPL-SCNC: 4.9 MMOL/L — SIGNIFICANT CHANGE UP (ref 3.5–5.3)
SODIUM SERPL-SCNC: 141 MMOL/L — SIGNIFICANT CHANGE UP (ref 135–145)

## 2020-04-04 PROCEDURE — 99233 SBSQ HOSP IP/OBS HIGH 50: CPT

## 2020-04-04 PROCEDURE — 99232 SBSQ HOSP IP/OBS MODERATE 35: CPT | Mod: GC

## 2020-04-04 RX ORDER — ATROPINE SULFATE 1 %
3 DROPS OPHTHALMIC (EYE) EVERY 8 HOURS
Refills: 0 | Status: DISCONTINUED | OUTPATIENT
Start: 2020-04-04 | End: 2020-04-10

## 2020-04-04 RX ADMIN — AMLODIPINE BESYLATE 5 MILLIGRAM(S): 2.5 TABLET ORAL at 05:04

## 2020-04-04 RX ADMIN — MONTELUKAST 10 MILLIGRAM(S): 4 TABLET, CHEWABLE ORAL at 12:05

## 2020-04-04 RX ADMIN — Medication 3 MILLILITER(S): at 05:25

## 2020-04-04 RX ADMIN — BUDESONIDE AND FORMOTEROL FUMARATE DIHYDRATE 2 PUFF(S): 160; 4.5 AEROSOL RESPIRATORY (INHALATION) at 08:45

## 2020-04-04 RX ADMIN — Medication 3 DROP(S): at 21:37

## 2020-04-04 RX ADMIN — HEPARIN SODIUM 5000 UNIT(S): 5000 INJECTION INTRAVENOUS; SUBCUTANEOUS at 05:04

## 2020-04-04 RX ADMIN — Medication 1: at 08:17

## 2020-04-04 RX ADMIN — Medication 25 MILLIGRAM(S): at 05:04

## 2020-04-04 RX ADMIN — SERTRALINE 100 MILLIGRAM(S): 25 TABLET, FILM COATED ORAL at 12:05

## 2020-04-04 RX ADMIN — BUPROPION HYDROCHLORIDE 150 MILLIGRAM(S): 150 TABLET, EXTENDED RELEASE ORAL at 12:04

## 2020-04-04 RX ADMIN — Medication 3 MILLILITER(S): at 14:55

## 2020-04-04 RX ADMIN — Medication 25 MILLIGRAM(S): at 17:13

## 2020-04-04 RX ADMIN — Medication 1: at 17:09

## 2020-04-04 RX ADMIN — HEPARIN SODIUM 5000 UNIT(S): 5000 INJECTION INTRAVENOUS; SUBCUTANEOUS at 21:37

## 2020-04-04 RX ADMIN — BUDESONIDE AND FORMOTEROL FUMARATE DIHYDRATE 2 PUFF(S): 160; 4.5 AEROSOL RESPIRATORY (INHALATION) at 22:41

## 2020-04-04 RX ADMIN — SCOPALAMINE 1 PATCH: 1 PATCH, EXTENDED RELEASE TRANSDERMAL at 09:34

## 2020-04-04 RX ADMIN — ATORVASTATIN CALCIUM 10 MILLIGRAM(S): 80 TABLET, FILM COATED ORAL at 21:36

## 2020-04-04 RX ADMIN — Medication 81 MILLIGRAM(S): at 12:05

## 2020-04-04 RX ADMIN — INSULIN GLARGINE 7 UNIT(S): 100 INJECTION, SOLUTION SUBCUTANEOUS at 21:40

## 2020-04-04 RX ADMIN — Medication 1334 MILLIGRAM(S): at 12:05

## 2020-04-04 RX ADMIN — Medication 3 MILLILITER(S): at 08:44

## 2020-04-04 RX ADMIN — CALCITRIOL 0.5 MICROGRAM(S): 0.5 CAPSULE ORAL at 12:05

## 2020-04-04 RX ADMIN — Medication 200 MILLIGRAM(S): at 02:41

## 2020-04-04 RX ADMIN — Medication 2: at 12:02

## 2020-04-04 RX ADMIN — Medication 10 MILLIGRAM(S): at 05:03

## 2020-04-04 RX ADMIN — Medication 3 DROP(S): at 17:13

## 2020-04-04 RX ADMIN — Medication 3 MILLILITER(S): at 22:41

## 2020-04-04 RX ADMIN — HEPARIN SODIUM 5000 UNIT(S): 5000 INJECTION INTRAVENOUS; SUBCUTANEOUS at 12:11

## 2020-04-04 RX ADMIN — Medication 1334 MILLIGRAM(S): at 17:13

## 2020-04-04 RX ADMIN — SCOPALAMINE 1 PATCH: 1 PATCH, EXTENDED RELEASE TRANSDERMAL at 12:11

## 2020-04-04 RX ADMIN — Medication 1334 MILLIGRAM(S): at 08:19

## 2020-04-04 NOTE — PROGRESS NOTE ADULT - SUBJECTIVE AND OBJECTIVE BOX
Follow-up Pulm Progress Note    Rod Avlaos MD  [phone]    Cough had been well controlled on scopalamine but patient had visual problems with it.   Will try atropine orally.    Medications:  Vital Signs Last 24 Hrs  T(C): 36.7 (04 Apr 2020 08:37), Max: 37.1 (04 Apr 2020 05:45)  T(F): 98.1 (04 Apr 2020 08:37), Max: 98.8 (04 Apr 2020 05:45)  HR: 83 (04 Apr 2020 08:47) (75 - 113)  BP: 162/88 (04 Apr 2020 08:37) (158/88 - 177/80)  BP(mean): --  RR: 14 (04 Apr 2020 08:37) (14 - 16)  SpO2: 94% (04 Apr 2020 08:47) (91% - 99%)          04-03 @ 07:01  -  04-04 @ 07:00  --------------------------------------------------------  IN: 320 mL / OUT: 0 mL / NET: 320 mL        LABS:                        7.5    10.03 )-----------( 354      ( 03 Apr 2020 07:14 )             24.7     04-04    141  |  107  |  69<H>  ----------------------------<  182<H>  4.9   |  24  |  2.61<H>    Ca    7.6<L>      04 Apr 2020 07:39  Mg     1.5     04-03                CULTURES:        Physical Examination:  PULM:Scattered coarse bs  CVS: Regular rate and rhythm, no murmurs, rubs, or gallops  ABD: Soft, non-tender  EXT:  No clubbing, cyanosis, or edema    RADIOLOGY REVIEWED  CXR:    CT chest:    TTE:

## 2020-04-04 NOTE — DISCHARGE NOTE PROVIDER - NSDCMRMEDTOKEN_GEN_ALL_CORE_FT
amLODIPine 5 mg oral tablet: 1 tab(s) orally once a day  aspirin 81 mg oral tablet, chewable: 1 tab(s) orally once a day  atorvastatin 10 mg oral tablet: 1 tab(s) orally once a day  buPROPion 100 mg oral tablet: 1 tab(s) orally 2 times a day  Bystolic 5 mg oral tablet: 1 tab(s) orally once a day  galantamine 4 mg oral tablet: 1 tab(s) orally 2 times a day  glimepiride 1 mg oral tablet: 1 tab(s) orally once a day  repaglinide 1 mg oral tablet: 1 tab(s) orally 3 times a day (before meals)  sertraline 100 mg oral tablet: 1 tab(s) orally once a day  sodium bicarbonate: 1300 milligram(s) orally 2 times a day albuterol 90 mcg/inh inhalation aerosol: 2 puff(s) inhaled every 6 hours, As needed, Shortness of Breath and/or Wheezing  ALPRAZolam 0.25 mg oral tablet: 1 tab(s) orally once a day, As needed, agitation  ALPRAZolam 0.25 mg oral tablet: 1 tab(s) orally once a day, As Needed -for agitation MDD:0.25 mg  amLODIPine 5 mg oral tablet: 1 tab(s) orally once a day  aspirin 81 mg oral tablet, chewable: 1 tab(s) orally once a day  atorvastatin 10 mg oral tablet: 1 tab(s) orally once a day  budesonide-formoterol 160 mcg-4.5 mcg/inh inhalation aerosol: 2 puff(s) inhaled 2 times a day   buPROPion 100 mg oral tablet: 1.5 tab(s) orally once a day  Bystolic 5 mg oral tablet: 1 tab(s) orally once a day  calcitriol 0.5 mcg oral capsule: 1 cap(s) orally once a day  calcium acetate 667 mg oral tablet: 2 tab(s) orally 3 times a day with meals  cefuroxime 500 mg oral tablet: 1 tab(s) orally 2 times a day for 9 days  galantamine 4 mg oral tablet: 1 tab(s) orally 2 times a day  guaiFENesin 100 mg/5 mL oral liquid: 10 milliliter(s) orally every 6 hours as needed for cough  montelukast 10 mg oral tablet: 1 tab(s) orally once a day  predniSONE 5 mg oral tablet: 1 tab(s) orally once a day for 4 days and then stop  repaglinide 1 mg oral tablet: 0.5 tab(s) orally 2 times a day (before breakfast and lunch)  scopolamine 1.5 mg transdermal film, extended release: 1 application transdermal every 72 hours   sertraline 100 mg oral tablet: 1 tab(s) orally once a day  sodium bicarbonate: 650 milligram(s) orally 4 times a day albuterol 90 mcg/inh inhalation aerosol: 2 puff(s) inhaled every 6 hours, As needed, Shortness of Breath and/or Wheezing  ALPRAZolam 0.25 mg oral tablet: 1 tab(s) orally once a day, As needed, agitation  amLODIPine 5 mg oral tablet: 1 tab(s) orally once a day  aspirin 81 mg oral tablet, chewable: 1 tab(s) orally once a day  atorvastatin 10 mg oral tablet: 1 tab(s) orally once a day  budesonide-formoterol 160 mcg-4.5 mcg/inh inhalation aerosol: 2 puff(s) inhaled 2 times a day   buPROPion 100 mg oral tablet: 1.5 tab(s) orally once a day  Bystolic 5 mg oral tablet: 1 tab(s) orally once a day  calcitriol 0.5 mcg oral capsule: 1 cap(s) orally once a day  calcium acetate 667 mg oral tablet: 2 tab(s) orally 3 times a day with meals  cefuroxime 500 mg oral tablet: 1 tab(s) orally 2 times a day for 9 days  galantamine 4 mg oral tablet: 1 tab(s) orally 2 times a day  guaiFENesin 100 mg/5 mL oral liquid: 10 milliliter(s) orally every 6 hours as needed for cough  montelukast 10 mg oral tablet: 1 tab(s) orally once a day  predniSONE 5 mg oral tablet: 1 tab(s) orally once a day for 4 days and then stop  repaglinide 1 mg oral tablet: 0.5 tab(s) orally 2 times a day (before breakfast and lunch)  scopolamine 1.5 mg transdermal film, extended release: 1 application transdermal every 72 hours   sertraline 100 mg oral tablet: 1 tab(s) orally once a day  sodium bicarbonate: 650 milligram(s) orally 4 times a day

## 2020-04-04 NOTE — PROGRESS NOTE ADULT - SUBJECTIVE AND OBJECTIVE BOX
JUNIOR BOURGEOIS  86y  Male    Patient is a 86y old  Male who presents with a chief complaint of Confusion, dyspnea (04 Apr 2020 14:53)      seen at bed side  comfortable.       PAST MEDICAL & SURGICAL HISTORY:  History of prostate cancer  Dementia  chronic renal disease  Dyslipidemia  H/O: obesity  COPD (chronic obstructive pulmonary disease)  Depression  Hyperlipemia  H/O: hypertension  Diabetes mellitus  Hx of coronary angiogram  Inguinal hernia          PHYSICAL EXAM:    T(C): 36.9 (04-04-20 @ 15:39), Max: 37.1 (04-04-20 @ 05:45)  HR: 93 (04-04-20 @ 15:39) (75 - 93)  BP: 151/91 (04-04-20 @ 15:39) (151/91 - 170/87)  RR: 18 (04-04-20 @ 15:39) (14 - 18)  SpO2: 95% (04-04-20 @ 15:39) (91% - 98%)  Wt(kg): --    I&O's Detail    03 Apr 2020 07:01  -  04 Apr 2020 07:00  --------------------------------------------------------  IN:    Oral Fluid: 320 mL  Total IN: 320 mL    OUT:  Total OUT: 0 mL    Total NET: 320 mL          Respiratory: clear anteriorly, decreased BS at bases  Cardiovascular: S1 S2  Gastrointestinal: soft NT ND +BS  Extremities: edema   Neuro: Awake and alert    MEDICATIONS  (STANDING):  albuterol/ipratropium for Nebulization 3 milliLiter(s) Nebulizer every 6 hours  amLODIPine   Tablet 5 milliGRAM(s) Oral daily  aspirin enteric coated 81 milliGRAM(s) Oral daily  atorvastatin 10 milliGRAM(s) Oral at bedtime  atropine 1% Ophthalmic Solution for SubLingual Use 3 Drop(s) SubLingual every 8 hours  budesonide 160 MICROgram(s)/formoterol 4.5 MICROgram(s) Inhaler 2 Puff(s) Inhalation two times a day  buPROPion XL . 150 milliGRAM(s) Oral daily  calcitriol   Capsule 0.5 MICROGram(s) Oral daily  calcium acetate 1334 milliGRAM(s) Oral three times a day with meals  dextrose 5%. 1000 milliLiter(s) (50 mL/Hr) IV Continuous <Continuous>  dextrose 50% Injectable 12.5 Gram(s) IV Push once  dextrose 50% Injectable 25 Gram(s) IV Push once  dextrose 50% Injectable 25 Gram(s) IV Push once  heparin  Injectable 5000 Unit(s) SubCutaneous every 8 hours  insulin glargine Injectable (LANTUS) 7 Unit(s) SubCutaneous at bedtime  insulin lispro (HumaLOG) corrective regimen sliding scale   SubCutaneous three times a day before meals  insulin lispro (HumaLOG) corrective regimen sliding scale   SubCutaneous at bedtime  metoprolol tartrate 25 milliGRAM(s) Oral two times a day  montelukast 10 milliGRAM(s) Oral daily  predniSONE   Tablet 10 milliGRAM(s) Oral daily  sertraline 100 milliGRAM(s) Oral daily    MEDICATIONS  (PRN):  acetaminophen   Tablet .. 650 milliGRAM(s) Oral every 4 hours PRN Mild Pain (1 - 3)  dextrose 40% Gel 15 Gram(s) Oral once PRN Blood Glucose LESS THAN 70 milliGRAM(s)/deciliter  glucagon  Injectable 1 milliGRAM(s) IntraMuscular once PRN Glucose LESS THAN 70 milligrams/deciliter  guaiFENesin   Syrup  (Sugar-Free) 200 milliGRAM(s) Oral every 6 hours PRN Cough                            7.5    10.03 )-----------( 354      ( 03 Apr 2020 07:14 )             24.7       04-04    141  |  107  |  69<H>  ----------------------------<  182<H>  4.9   |  24  |  2.61<H>    Ca    7.6<L>      04 Apr 2020 07:39  Mg     1.5     04-03        Creatinine Trend: Creatinine Trend: 2.61<--, 2.71<--, 2.97<--, 3.08<--, 3.21<--, 3.41<--

## 2020-04-04 NOTE — PROGRESS NOTE ADULT - SUBJECTIVE AND OBJECTIVE BOX
CHIEF COMPLAINT:    SUBJECTIVE:     REVIEW OF SYSTEMS:  CONSTITUTIONAL: No weakness, fevers or chills  EYES/ENT: No visual changes;  No vertigo or throat pain   NECK: No pain or stiffness  RESPIRATORY: No cough, wheezing, hemoptysis; No shortness of breath  CARDIOVASCULAR: No chest pain or palpitations  GASTROINTESTINAL: No abdominal or epigastric pain. No nausea, vomiting, or hematemesis; No diarrhea or constipation. No melena or hematochezia.  GENITOURINARY: No dysuria, frequency or hematuria  NEUROLOGICAL: No numbness or weakness  SKIN: No itching, burning, rashes, or lesions   All other review of systems is negative unless indicated above    Vital Signs Last 24 Hrs  T(C): 36.9 (04 Apr 2020 15:39), Max: 37.1 (04 Apr 2020 05:45)  T(F): 98.4 (04 Apr 2020 15:39), Max: 98.8 (04 Apr 2020 05:45)  HR: 93 (04 Apr 2020 15:39) (75 - 93)  BP: 151/91 (04 Apr 2020 15:39) (151/91 - 170/87)  BP(mean): --  RR: 18 (04 Apr 2020 15:39) (14 - 18)  SpO2: 95% (04 Apr 2020 15:39) (91% - 98%)    I&O's Summary    03 Apr 2020 07:01  -  04 Apr 2020 07:00  --------------------------------------------------------  IN: 320 mL / OUT: 0 mL / NET: 320 mL        CAPILLARY BLOOD GLUCOSE      POCT Blood Glucose.: 195 mg/dL (04 Apr 2020 16:38)  POCT Blood Glucose.: 236 mg/dL (04 Apr 2020 11:57)  POCT Blood Glucose.: 193 mg/dL (04 Apr 2020 07:51)  POCT Blood Glucose.: 169 mg/dL (03 Apr 2020 21:43)      PHYSICAL EXAM:  Constitutional: NAD, awake and alert, well-developed  HEENT: PERR, EOMI, Normal Hearing, MMM  Neck: Soft and supple, No LAD, No JVD  Respiratory: Breath sounds are clear bilaterally, No wheezing, rales or rhonchi  Cardiovascular: S1 and S2, regular rate and rhythm, no Murmurs, gallops or rubs  Gastrointestinal: Bowel Sounds present, soft, nontender, nondistended, no guarding, no rebound  Extremities: No peripheral edema  Vascular: 2+ peripheral pulses  Neurological: A/O x 3, no focal deficits  Musculoskeletal: 5/5 strength b/l upper and lower extremities  Skin: No rashes    MEDICATIONS:  MEDICATIONS  (STANDING):  albuterol/ipratropium for Nebulization 3 milliLiter(s) Nebulizer every 6 hours  amLODIPine   Tablet 5 milliGRAM(s) Oral daily  aspirin enteric coated 81 milliGRAM(s) Oral daily  atorvastatin 10 milliGRAM(s) Oral at bedtime  atropine 1% Ophthalmic Solution for SubLingual Use 3 Drop(s) SubLingual every 8 hours  budesonide 160 MICROgram(s)/formoterol 4.5 MICROgram(s) Inhaler 2 Puff(s) Inhalation two times a day  buPROPion XL . 150 milliGRAM(s) Oral daily  calcitriol   Capsule 0.5 MICROGram(s) Oral daily  calcium acetate 1334 milliGRAM(s) Oral three times a day with meals  dextrose 5%. 1000 milliLiter(s) (50 mL/Hr) IV Continuous <Continuous>  dextrose 50% Injectable 12.5 Gram(s) IV Push once  dextrose 50% Injectable 25 Gram(s) IV Push once  dextrose 50% Injectable 25 Gram(s) IV Push once  heparin  Injectable 5000 Unit(s) SubCutaneous every 8 hours  insulin glargine Injectable (LANTUS) 7 Unit(s) SubCutaneous at bedtime  insulin lispro (HumaLOG) corrective regimen sliding scale   SubCutaneous three times a day before meals  insulin lispro (HumaLOG) corrective regimen sliding scale   SubCutaneous at bedtime  metoprolol tartrate 25 milliGRAM(s) Oral two times a day  montelukast 10 milliGRAM(s) Oral daily  predniSONE   Tablet 10 milliGRAM(s) Oral daily  sertraline 100 milliGRAM(s) Oral daily    MEDICATIONS  (PRN):  acetaminophen   Tablet .. 650 milliGRAM(s) Oral every 4 hours PRN Mild Pain (1 - 3)  dextrose 40% Gel 15 Gram(s) Oral once PRN Blood Glucose LESS THAN 70 milliGRAM(s)/deciliter  glucagon  Injectable 1 milliGRAM(s) IntraMuscular once PRN Glucose LESS THAN 70 milligrams/deciliter  guaiFENesin   Syrup  (Sugar-Free) 200 milliGRAM(s) Oral every 6 hours PRN Cough      LABS: All Labs Reviewed:                        7.5    10.03 )-----------( 354      ( 03 Apr 2020 07:14 )             24.7     04-04    141  |  107  |  69<H>  ----------------------------<  182<H>  4.9   |  24  |  2.61<H>    Ca    7.6<L>      04 Apr 2020 07:39  Mg     1.5     04-03            Blood Culture:     RADIOLOGY/EKG:    DVT PPX:    ADVANCED DIRECTIVE:    DISPOSITION: SUBJECTIVE: This morning patient states that he is tired. Stayed up all night coughing. States that he feels very weak.     REVIEW OF SYSTEMS:  CONSTITUTIONAL: + weakness: no fevers or chills  EYES/ENT: occasional blurred vision;  No vertigo or throat pain   NECK: No pain or stiffness  RESPIRATORY: + cough: no wheezing, hemoptysis; No shortness of breath  CARDIOVASCULAR: No chest pain or palpitations  GASTROINTESTINAL: No abdominal or epigastric pain. No nausea, vomiting, or hematemesis; No diarrhea or constipation. No melena or hematochezia.  GENITOURINARY: No dysuria, frequency or hematuria  NEUROLOGICAL: No numbness or weakness  SKIN: No itching, burning, rashes, or lesions   All other review of systems is negative unless indicated above    Vital Signs Last 24 Hrs  T(C): 36.9 (04 Apr 2020 15:39), Max: 37.1 (04 Apr 2020 05:45)  T(F): 98.4 (04 Apr 2020 15:39), Max: 98.8 (04 Apr 2020 05:45)  HR: 93 (04 Apr 2020 15:39) (75 - 93)  BP: 151/91 (04 Apr 2020 15:39) (151/91 - 170/87)  RR: 18 (04 Apr 2020 15:39) (14 - 18)  SpO2: 95% (04 Apr 2020 15:39) (91% - 98%) on room air    I&O's Summary    03 Apr 2020 07:01  -  04 Apr 2020 07:00  --------------------------------------------------------  IN: 320 mL / OUT: 0 mL / NET: 320 mL        CAPILLARY BLOOD GLUCOSE      POCT Blood Glucose.: 195 mg/dL (04 Apr 2020 16:38)  POCT Blood Glucose.: 236 mg/dL (04 Apr 2020 11:57)  POCT Blood Glucose.: 193 mg/dL (04 Apr 2020 07:51)  POCT Blood Glucose.: 169 mg/dL (03 Apr 2020 21:43)      PHYSICAL EXAM:  Constitutional: NAD, awake and alert, frail  HEENT: PERR, EOMI, hard of hearing, MMM,  Respiratory: upper airway congestion; CTABL  Cardiovascular: S1 and S2, regular rate and rhythm, no Murmurs, gallops or rubs  Gastrointestinal: Bowel Sounds present, soft, nontender, nondistended, no guarding, no rebound  Extremities: No peripheral edema  Vascular: 2+ peripheral pulses  Neurological: A/O x 3, no focal deficits  Musculoskeletal: 4-/5 strength b/l upper and lower extremities  Skin: No rashes    MEDICATIONS:  MEDICATIONS  (STANDING):  albuterol/ipratropium for Nebulization 3 milliLiter(s) Nebulizer every 6 hours  amLODIPine   Tablet 5 milliGRAM(s) Oral daily  aspirin enteric coated 81 milliGRAM(s) Oral daily  atorvastatin 10 milliGRAM(s) Oral at bedtime  atropine 1% Ophthalmic Solution for SubLingual Use 3 Drop(s) SubLingual every 8 hours  budesonide 160 MICROgram(s)/formoterol 4.5 MICROgram(s) Inhaler 2 Puff(s) Inhalation two times a day  buPROPion XL . 150 milliGRAM(s) Oral daily  calcitriol   Capsule 0.5 MICROGram(s) Oral daily  calcium acetate 1334 milliGRAM(s) Oral three times a day with meals  dextrose 5%. 1000 milliLiter(s) (50 mL/Hr) IV Continuous <Continuous>  dextrose 50% Injectable 12.5 Gram(s) IV Push once  dextrose 50% Injectable 25 Gram(s) IV Push once  dextrose 50% Injectable 25 Gram(s) IV Push once  heparin  Injectable 5000 Unit(s) SubCutaneous every 8 hours  insulin glargine Injectable (LANTUS) 7 Unit(s) SubCutaneous at bedtime  insulin lispro (HumaLOG) corrective regimen sliding scale   SubCutaneous three times a day before meals  insulin lispro (HumaLOG) corrective regimen sliding scale   SubCutaneous at bedtime  metoprolol tartrate 25 milliGRAM(s) Oral two times a day  montelukast 10 milliGRAM(s) Oral daily  predniSONE   Tablet 10 milliGRAM(s) Oral daily  sertraline 100 milliGRAM(s) Oral daily    MEDICATIONS  (PRN):  acetaminophen   Tablet .. 650 milliGRAM(s) Oral every 4 hours PRN Mild Pain (1 - 3)  dextrose 40% Gel 15 Gram(s) Oral once PRN Blood Glucose LESS THAN 70 milliGRAM(s)/deciliter  glucagon  Injectable 1 milliGRAM(s) IntraMuscular once PRN Glucose LESS THAN 70 milligrams/deciliter  guaiFENesin   Syrup  (Sugar-Free) 200 milliGRAM(s) Oral every 6 hours PRN Cough      LABS: All Labs Reviewed:                        7.5    10.03 )-----------( 354      ( 03 Apr 2020 07:14 )             24.7     04-04    141  |  107  |  69<H>  ----------------------------<  182<H>  4.9   |  24  |  2.61<H>    Ca    7.6<L>      04 Apr 2020 07:39  Mg     1.5     04-03

## 2020-04-04 NOTE — PROGRESS NOTE ADULT - ASSESSMENT
86M w/ PMH of Dementia, DM, HTN, Depression, CKD4, COPD presents to the ED with difficulty breathing and progressive confusion. Admitted wBogdan calle COPD exacerbation.       *SOB likely 2/2 metabolic acidosis, COPD exacerbation - resolved  Hx diastolic HF pEF- exacerbation does not seem likely  Afebrile, COVID 19-Negative  CXR: patchy infiltrates in RLL; will continue to monitor off abx for now; procalcitonin 0.86  Continue Albuterol, Symbicort, Nebs   Continue PO steroids  Cont Ativan 0.25mg q8 prn  Cont Guaifenesin, atropine started today by pulm for congestion  CXR on 3/29 with possible L pleural effusion.   Appreciate Pulm recs   supplemental O2 d/c on 4/4    *Metabolic encephalopathy: 2/2 metabolic acidosis/multiple electrolytes abnormalities likely 2/2 Denosumab & PABLITO  Improving  Cont to correct Lytes  Trend BMP  Aspiration precautions  Appreciate Renal recs     *PABLITO (Cr 2.8 in May 2019) on CKD stage 4   Cr slowly improving   Renal following  Avoid nephrotoxic meds    *Normocytic anemia 2/2 CKD  stable    *Hypocalcemia likely 2/2 Denosumab vs secondary hyperparathyroidism   Prolonged QTc on ECG (566) on admission - improving ( on 3/25)  Continue Rocaltrol Phoslo   Calcium gluconate PO as per renal  Appreciate Renal recs   Trend calcium levels   Replete per renal recs    *Hypomagnesemia and Hyperkalemia   Resolved    *Urinary retention  Improved  Cont Flomax    *HTN  Continue Amlodipine, Metoprolol     *DMII  Cont Lantus 7 Units HS   Cont FS/ISS  Blood glu elevated likely due to steroids    *Depression  Cont Sertraline, Buproprion (home dose buproprion 100mg BID, pharmacy here only has 150mg XL)    *DVT ppx  UFH    *Goals of care: DNR/DNI    Dispo: Family refusing [facility] due to possibilty of exposure to COVID. CM in process of getting necessary DME for family.

## 2020-04-04 NOTE — DISCHARGE NOTE PROVIDER - CARE PROVIDER_API CALL
Kurzweil, Peter J (MD)  Internal Medicine  [address] [place] [id number]  Phone: [phone]  Fax: [phone]  Follow Up Time:

## 2020-04-04 NOTE — DISCHARGE NOTE PROVIDER - HOSPITAL COURSE
86M with prostate cancer dementia, T2DM, HTN, depression, CKD IV, and COPD presented for progressive dyspnea and confusion. After denozumab injection for prostate cancer on 3/12, patient demonstrated weakness and confusion. On 3/19, patient began having hallucinations, anorexia, and increased weakness. Patient has dyspnea at baseline secondary to COPD. On 3/21, he developed dyspnea increased from baseline and intermittent wheezing. Patient did not have fever, vomiting, or diarrhea. On presentation to the ED, patient was found to have acute hypoxic respiratory failure, metabolic acidosis, hypocalcemia, hypomagnesemia, hyperkalemia, and PABLITO on CKD. Pulmonary, nephrology, and palliative care were consulted. It was determined that metabolic acidosis and electrolyte derangements were most likely secondary to denozumab injection in the setting of advanced CKD. Hypoxia and dyspnea were likely secondary to both metabolic acidosis and COPD exacerbation.        Acute hypoxic respiratory failure was managed with supplemental oxygen. Metabolic acidosis was corrected with sodium bicarbonate. COPD exacerbation was treated with steroids and scopolamine patch. Scopolamine patch was discontinued after patient developed visual problems. Calcium and magnesium were repleted, and potassium was chelated. Patient was gradually weaned off of supplemental oxygen. PABLITO gradually resolved. At the time of discharge, patient is at baseline with respect to mental status, dyspnea, and renal function. 86M with prostate cancer dementia, T2DM, HTN, depression, CKD IV, and COPD presented for progressive dyspnea and confusion. After denozumab injection for prostate cancer on 3/12, patient demonstrated weakness and confusion. On 3/19, patient began having hallucinations, anorexia, and increased weakness. Patient has dyspnea at baseline secondary to COPD. On 3/21, he developed dyspnea increased from baseline and intermittent wheezing. Patient did not have fever, vomiting, or diarrhea. On presentation to the ED, patient was found to have acute hypoxic respiratory failure, metabolic acidosis, hypocalcemia, hypomagnesemia, hyperkalemia, and PABLITO on CKD. Pulmonary, nephrology, and palliative care were consulted. It was determined that metabolic acidosis and electrolyte derangements were most likely secondary to denozumab injection in the setting of advanced CKD. Hypoxia and dyspnea were likely secondary to both metabolic acidosis and COPD exacerbation.        Acute hypoxic respiratory failure was managed with supplemental oxygen. Metabolic acidosis was corrected with sodium bicarbonate. COPD exacerbation was treated with steroids and scopolamine patch. Scopolamine patch was discontinued after patient developed visual problems. Calcium and magnesium were repleted, and potassium was chelated. Patient requiring supplemental oxygen as he was desaturating 87% on room air. Patient would be discharged on home oxgyen. PABLITO gradually resolved. At the time of discharge, patient is at baseline with respect to mental status, dyspnea, and renal function. 86M with prostate cancer dementia, T2DM, HTN, depression, CKD IV, and COPD presented for progressive dyspnea and confusion. After denozumab injection for prostate cancer on 3/12, patient demonstrated weakness and confusion. On 3/19, patient began having hallucinations, anorexia, and increased weakness. Patient has dyspnea at baseline secondary to COPD. On 3/21, he developed dyspnea increased from baseline and intermittent wheezing. Patient did not have fever, vomiting, or diarrhea. On presentation to the ED, patient was found to have acute hypoxic respiratory failure, metabolic acidosis, hypocalcemia, hypomagnesemia, hyperkalemia, and PABLITO on CKD. Pulmonary, nephrology, and palliative care were consulted. It was determined that metabolic acidosis and electrolyte derangements were most likely secondary to denozumab injection in the setting of advanced CKD. Hypoxia and dyspnea were likely secondary to both metabolic acidosis and COPD exacerbation.        Patient was admitted to the floor for further management. For his acute hypoxic respiratory failure, repeat COVID testing was done and negative. He was started on Zosyn and steroids. He was continued on inhalers and additional medications for symptomatic management. Patient was continued on supplemental oxygen. For his metabolic derangements and PABLITO, nephrology was consulted and adjusted his medications. For his chronic medical problems, including anemia, hypertension, diabetes, hyperlipidemia and depression, patient was continued on his medications. His progress and prognosis was discussed with daughter, palliative care and nephrology. Family would like to pursue home hospice at this time.  helped to set up arrangements. Patient stable for discharge home with hospice.

## 2020-04-04 NOTE — DISCHARGE NOTE PROVIDER - NSDCCPCAREPLAN_GEN_ALL_CORE_FT
PRINCIPAL DISCHARGE DIAGNOSIS  Diagnosis: Hypocalcemia  Assessment and Plan of Treatment: Resolved      SECONDARY DISCHARGE DIAGNOSES  Diagnosis: COPD with hypoxia  Assessment and Plan of Treatment: Your oxygen saturation was low on room air. You will need supplemental oxygen and would be discharged home with oxygen    Diagnosis: Metabolic acidosis  Assessment and Plan of Treatment: Resolved PRINCIPAL DISCHARGE DIAGNOSIS  Diagnosis: COPD with hypoxia  Assessment and Plan of Treatment: Patient will continue with medications and supplemental oxygen      SECONDARY DISCHARGE DIAGNOSES  Diagnosis: Pneumonia  Assessment and Plan of Treatment: Patient will continue with medications to complete antibiotic course.    Diagnosis: Metabolic acidosis  Assessment and Plan of Treatment: Nephrology was consulted and adjusted medications with improvement.    Diagnosis: COPD with hypoxia  Assessment and Plan of Treatment: Your oxygen saturation was low on room air. You will need supplemental oxygen and would be discharged home with oxygen PRINCIPAL DISCHARGE DIAGNOSIS  Diagnosis: COPD with hypoxia  Assessment and Plan of Treatment: Patient will continue with medications and supplemental oxygen. Patient will continue with home hospice care      SECONDARY DISCHARGE DIAGNOSES  Diagnosis: Pneumonia  Assessment and Plan of Treatment: Patient will continue with medications to complete antibiotic course for 9 more days.    Diagnosis: Metabolic acidosis  Assessment and Plan of Treatment: Nephrology was consulted and adjusted medications with improvement.    Diagnosis: COPD with hypoxia  Assessment and Plan of Treatment: Your oxygen saturation was low on room air. You will need supplemental oxygen and would be discharged home with oxygen

## 2020-04-04 NOTE — PROGRESS NOTE ADULT - ASSESSMENT
85 yo M with PMHx of dementia, DM, HTN, depression, CKD, COPD presents to the ED with difficulty breathing and progressive confusion. COPD exacerbation now improving. Renal indices are now slowly but steadily improving towards baseline. Avoid NSIADS and ACEI. Discharge planning in progress.

## 2020-04-04 NOTE — DISCHARGE NOTE PROVIDER - NSDCACTIVITY_GEN_ALL_CORE
Bathing allowed/Showering allowed/No heavy lifting/straining/Walking - Indoors allowed/Stairs allowed/Walking - Outdoors allowed

## 2020-04-05 LAB
ANION GAP SERPL CALC-SCNC: 14 MMOL/L — SIGNIFICANT CHANGE UP (ref 5–17)
BUN SERPL-MCNC: 68 MG/DL — HIGH (ref 7–23)
CALCIUM SERPL-MCNC: 6.9 MG/DL — LOW (ref 8.4–10.5)
CHLORIDE SERPL-SCNC: 106 MMOL/L — SIGNIFICANT CHANGE UP (ref 96–108)
CO2 SERPL-SCNC: 21 MMOL/L — LOW (ref 22–31)
CREAT SERPL-MCNC: 2.72 MG/DL — HIGH (ref 0.5–1.3)
GLUCOSE BLDC GLUCOMTR-MCNC: 166 MG/DL — HIGH (ref 70–99)
GLUCOSE BLDC GLUCOMTR-MCNC: 173 MG/DL — HIGH (ref 70–99)
GLUCOSE BLDC GLUCOMTR-MCNC: 176 MG/DL — HIGH (ref 70–99)
GLUCOSE BLDC GLUCOMTR-MCNC: 190 MG/DL — HIGH (ref 70–99)
GLUCOSE SERPL-MCNC: 165 MG/DL — HIGH (ref 70–99)
POTASSIUM SERPL-MCNC: 4.6 MMOL/L — SIGNIFICANT CHANGE UP (ref 3.5–5.3)
POTASSIUM SERPL-SCNC: 4.6 MMOL/L — SIGNIFICANT CHANGE UP (ref 3.5–5.3)
SODIUM SERPL-SCNC: 141 MMOL/L — SIGNIFICANT CHANGE UP (ref 135–145)

## 2020-04-05 PROCEDURE — 99232 SBSQ HOSP IP/OBS MODERATE 35: CPT | Mod: GC

## 2020-04-05 PROCEDURE — 99233 SBSQ HOSP IP/OBS HIGH 50: CPT

## 2020-04-05 RX ORDER — CALCIUM GLUCONATE 100 MG/ML
2 VIAL (ML) INTRAVENOUS EVERY 4 HOURS
Refills: 0 | Status: COMPLETED | OUTPATIENT
Start: 2020-04-05 | End: 2020-04-05

## 2020-04-05 RX ADMIN — Medication 200 MILLIGRAM(S): at 01:47

## 2020-04-05 RX ADMIN — AMLODIPINE BESYLATE 5 MILLIGRAM(S): 2.5 TABLET ORAL at 04:16

## 2020-04-05 RX ADMIN — Medication 3 DROP(S): at 21:50

## 2020-04-05 RX ADMIN — BUDESONIDE AND FORMOTEROL FUMARATE DIHYDRATE 2 PUFF(S): 160; 4.5 AEROSOL RESPIRATORY (INHALATION) at 21:51

## 2020-04-05 RX ADMIN — HEPARIN SODIUM 5000 UNIT(S): 5000 INJECTION INTRAVENOUS; SUBCUTANEOUS at 13:49

## 2020-04-05 RX ADMIN — Medication 3 MILLILITER(S): at 08:54

## 2020-04-05 RX ADMIN — Medication 1334 MILLIGRAM(S): at 08:02

## 2020-04-05 RX ADMIN — BUPROPION HYDROCHLORIDE 150 MILLIGRAM(S): 150 TABLET, EXTENDED RELEASE ORAL at 13:48

## 2020-04-05 RX ADMIN — HEPARIN SODIUM 5000 UNIT(S): 5000 INJECTION INTRAVENOUS; SUBCUTANEOUS at 06:42

## 2020-04-05 RX ADMIN — Medication 1334 MILLIGRAM(S): at 16:56

## 2020-04-05 RX ADMIN — INSULIN GLARGINE 7 UNIT(S): 100 INJECTION, SOLUTION SUBCUTANEOUS at 22:10

## 2020-04-05 RX ADMIN — SERTRALINE 100 MILLIGRAM(S): 25 TABLET, FILM COATED ORAL at 13:47

## 2020-04-05 RX ADMIN — Medication 3 MILLILITER(S): at 14:37

## 2020-04-05 RX ADMIN — Medication 200 GRAM(S): at 16:56

## 2020-04-05 RX ADMIN — Medication 3 DROP(S): at 06:42

## 2020-04-05 RX ADMIN — Medication 1: at 16:57

## 2020-04-05 RX ADMIN — Medication 81 MILLIGRAM(S): at 13:48

## 2020-04-05 RX ADMIN — BUDESONIDE AND FORMOTEROL FUMARATE DIHYDRATE 2 PUFF(S): 160; 4.5 AEROSOL RESPIRATORY (INHALATION) at 08:53

## 2020-04-05 RX ADMIN — Medication 25 MILLIGRAM(S): at 16:56

## 2020-04-05 RX ADMIN — CALCITRIOL 0.5 MICROGRAM(S): 0.5 CAPSULE ORAL at 13:47

## 2020-04-05 RX ADMIN — Medication 25 MILLIGRAM(S): at 04:16

## 2020-04-05 RX ADMIN — Medication 1334 MILLIGRAM(S): at 13:47

## 2020-04-05 RX ADMIN — Medication 10 MILLIGRAM(S): at 04:16

## 2020-04-05 RX ADMIN — HEPARIN SODIUM 5000 UNIT(S): 5000 INJECTION INTRAVENOUS; SUBCUTANEOUS at 21:50

## 2020-04-05 RX ADMIN — Medication 3 MILLILITER(S): at 21:51

## 2020-04-05 RX ADMIN — Medication 200 GRAM(S): at 13:58

## 2020-04-05 RX ADMIN — ATORVASTATIN CALCIUM 10 MILLIGRAM(S): 80 TABLET, FILM COATED ORAL at 21:49

## 2020-04-05 RX ADMIN — Medication 1: at 08:00

## 2020-04-05 RX ADMIN — MONTELUKAST 10 MILLIGRAM(S): 4 TABLET, CHEWABLE ORAL at 13:47

## 2020-04-05 RX ADMIN — Medication 3 DROP(S): at 13:48

## 2020-04-05 RX ADMIN — Medication 3 MILLILITER(S): at 03:50

## 2020-04-05 RX ADMIN — Medication 1: at 12:11

## 2020-04-05 NOTE — PROGRESS NOTE ADULT - ASSESSMENT
87 yo M with PMHx of dementia, DM, HTN, depression, CKD, COPD presents to the ED with difficulty breathing and progressive confusion. COPD exacerbation now improving. Renal indices are now slowly but steadily improving towards baseline. Avoid NSIADS and ACEI. Discharge planning in progress. Labs and medications reviewed

## 2020-04-05 NOTE — PROGRESS NOTE ADULT - ASSESSMENT
86M w/ PMH of Dementia, DM, HTN, Depression, CKD4, COPD presents to the ED with difficulty breathing and progressive confusion. Admitted delia calle COPD exacerbation.       *SOB likely 2/2 metabolic acidosis, COPD exacerbation - resolved  Hx diastolic HF pEF- exacerbation does not seem likely  Afebrile, COVID 19-Negative  CXR: patchy infiltrates in RLL; will continue to monitor off abx for now  Continue Albuterol, Symbicort, Nebs   Continue PO steroids  Cont Ativan 0.25mg q8 prn  Cont Guaifenesin, atropine started today by pulm for congestion  CXR on 3/29 with possible L pleural effusion.   Appreciate Pulm recs   supplemental O2 d/c on 4/4    *Metabolic encephalopathy: 2/2 metabolic acidosis/multiple electrolytes abnormalities likely 2/2 Denosumab & PABLITO  Improving  Cont to correct Lytes  Trend BMP  Aspiration precautions  Appreciate Renal recs     *PABLITO (Cr 2.8 in May 2019) on CKD stage 4   Cr slowly improving   Renal following  Avoid nephrotoxic meds    *Normocytic anemia 2/2 CKD  stable    *Hypocalcemia likely 2/2 Denosumab vs secondary hyperparathyroidism   Prolonged QTc on ECG (566) on admission - improving ( on 3/25)  Continue Rocaltrol Phoslo   Calcium gluconate IV  Appreciate Renal recs   Trend calcium levels   check Mg 2+     *Hypomagnesemia and Hyperkalemia   Resolved    *Urinary retention  Improved  Cont Flomax    *HTN  Continue Amlodipine, Metoprolol     *DMII  Cont Lantus 7 Units HS   Cont FS/ISS  Blood glu elevated likely due to steroids    *Depression  Cont Sertraline, Buproprion (home dose buproprion 100mg BID, pharmacy here only has 150mg XL)    *DVT ppx  UFH    *Goals of care: DNR/DNI    Dispo: Family refusing KRUPA due to possibilty of exposure to COVID. CM in process of getting necessary DME for family.     Pt seen, case and plan discussed with Dr. Meredith Pelletier  PGY-1

## 2020-04-05 NOTE — PROGRESS NOTE ADULT - SUBJECTIVE AND OBJECTIVE BOX
JUNIOR BOURGEOIS  86y  Male    Patient is a 86y old  Male who presents with a chief complaint of Confusion, dyspnea (05 Apr 2020 12:58)      on nasal cannula  comfortable.      PAST MEDICAL & SURGICAL HISTORY:  History of prostate cancer  Dementia  chronic renal disease  Dyslipidemia  H/O: obesity  COPD (chronic obstructive pulmonary disease)  Depression  Hyperlipemia  H/O: hypertension  Diabetes mellitus  Hx of coronary angiogram  Inguinal hernia          PHYSICAL EXAM:    T(C): 36.7 (04-05-20 @ 04:31), Max: 36.9 (04-04-20 @ 15:39)  HR: 87 (04-05-20 @ 08:57) (83 - 93)  BP: 160/77 (04-05-20 @ 04:31) (151/91 - 165/88)  RR: 19 (04-05-20 @ 04:31) (17 - 19)  SpO2: 96% (04-05-20 @ 08:57) (94% - 96%)  Wt(kg): --    I&O's Detail      Respiratory: clear anteriorly, decreased BS at bases  Cardiovascular: S1 S2  Gastrointestinal: soft NT ND +BS  Extremities: trace edema   Neuro: Awake and alert    MEDICATIONS  (STANDING):  albuterol/ipratropium for Nebulization 3 milliLiter(s) Nebulizer every 6 hours  amLODIPine   Tablet 5 milliGRAM(s) Oral daily  aspirin enteric coated 81 milliGRAM(s) Oral daily  atorvastatin 10 milliGRAM(s) Oral at bedtime  atropine 1% Ophthalmic Solution for SubLingual Use 3 Drop(s) SubLingual every 8 hours  budesonide 160 MICROgram(s)/formoterol 4.5 MICROgram(s) Inhaler 2 Puff(s) Inhalation two times a day  buPROPion XL . 150 milliGRAM(s) Oral daily  calcitriol   Capsule 0.5 MICROGram(s) Oral daily  calcium acetate 1334 milliGRAM(s) Oral three times a day with meals  calcium gluconate IVPB 2 Gram(s) IV Intermittent every 4 hours  dextrose 5%. 1000 milliLiter(s) (50 mL/Hr) IV Continuous <Continuous>  dextrose 50% Injectable 12.5 Gram(s) IV Push once  dextrose 50% Injectable 25 Gram(s) IV Push once  dextrose 50% Injectable 25 Gram(s) IV Push once  heparin  Injectable 5000 Unit(s) SubCutaneous every 8 hours  insulin glargine Injectable (LANTUS) 7 Unit(s) SubCutaneous at bedtime  insulin lispro (HumaLOG) corrective regimen sliding scale   SubCutaneous three times a day before meals  insulin lispro (HumaLOG) corrective regimen sliding scale   SubCutaneous at bedtime  metoprolol tartrate 25 milliGRAM(s) Oral two times a day  montelukast 10 milliGRAM(s) Oral daily  predniSONE   Tablet 10 milliGRAM(s) Oral daily  sertraline 100 milliGRAM(s) Oral daily    MEDICATIONS  (PRN):  acetaminophen   Tablet .. 650 milliGRAM(s) Oral every 4 hours PRN Mild Pain (1 - 3)  dextrose 40% Gel 15 Gram(s) Oral once PRN Blood Glucose LESS THAN 70 milliGRAM(s)/deciliter  glucagon  Injectable 1 milliGRAM(s) IntraMuscular once PRN Glucose LESS THAN 70 milligrams/deciliter  guaiFENesin   Syrup  (Sugar-Free) 200 milliGRAM(s) Oral every 6 hours PRN Cough          04-05    141  |  106  |  68<H>  ----------------------------<  165<H>  4.6   |  21<L>  |  2.72<H>    Ca    6.9<L>      05 Apr 2020 08:09        Creatinine Trend: Creatinine Trend: 2.72<--, 2.61<--, 2.71<--, 2.97<--, 3.08<--, 3.21<--

## 2020-04-05 NOTE — PROGRESS NOTE ADULT - SUBJECTIVE AND OBJECTIVE BOX
SUBJECTIVE: Pt seen and evaluated at bedside. Still complains of cough, non-productive in nature. Vitals reviewed. Currently receiving oxygen via NC. No other complaints.      REVIEW OF SYSTEMS:  CONSTITUTIONAL: + weakness: no fevers or chills  EYES/ENT: occasional blurred vision;  No vertigo or throat pain   NECK: No pain or stiffness  RESPIRATORY: + cough: no wheezing, hemoptysis; No shortness of breath  CARDIOVASCULAR: No chest pain or palpitations  GASTROINTESTINAL: No abdominal or epigastric pain. No nausea, vomiting, or hematemesis; No diarrhea or constipation. No melena or hematochezia.  GENITOURINARY: No dysuria, frequency or hematuria  NEUROLOGICAL: No numbness or weakness  SKIN: No itching, burning, rashes, or lesions   All other review of systems is negative unless indicated above    Vital Signs Last 24 Hrs  T(C): 36.7 (05 Apr 2020 04:31), Max: 36.9 (04 Apr 2020 15:39)  T(F): 98.1 (05 Apr 2020 04:31), Max: 98.4 (04 Apr 2020 15:39)  HR: 87 (05 Apr 2020 08:57) (83 - 93)  BP: 160/77 (05 Apr 2020 04:31) (151/91 - 165/88)  BP(mean): --  RR: 19 (05 Apr 2020 04:31) (17 - 19)  SpO2: 96% (05 Apr 2020 08:57) (94% - 96%)      I&O's Summary    03 Apr 2020 07:01  -  04 Apr 2020 07:00  --------------------------------------------------------  IN: 320 mL / OUT: 0 mL / NET: 320 mL        CAPILLARY BLOOD GLUCOSE    POCT Blood Glucose.: 166 mg/dL (05 Apr 2020 12:10)  POCT Blood Glucose.: 173 mg/dL (05 Apr 2020 07:59)  POCT Blood Glucose.: 116 mg/dL (04 Apr 2020 21:40)  POCT Blood Glucose.: 195 mg/dL (04 Apr 2020 16:38)      PHYSICAL EXAM:  Constitutional: NAD, awake and alert, frail  HEENT: PERR, EOMI, hard of hearing, MMM,  Respiratory: +coarse breath sounds noted   Cardiovascular: S1 and S2, regular rate and rhythm, no Murmurs, gallops or rubs  Gastrointestinal: Bowel Sounds present, soft, nontender, nondistended, no guarding, no rebound  Extremities: No peripheral edema  Vascular: 2+ peripheral pulses  Neurological: A/O x 3, no focal deficits  Musculoskeletal: 4-/5 strength b/l upper and lower extremities  Skin: No rashes    MEDICATIONS  (STANDING):  albuterol/ipratropium for Nebulization 3 milliLiter(s) Nebulizer every 6 hours  amLODIPine   Tablet 5 milliGRAM(s) Oral daily  aspirin enteric coated 81 milliGRAM(s) Oral daily  atorvastatin 10 milliGRAM(s) Oral at bedtime  atropine 1% Ophthalmic Solution for SubLingual Use 3 Drop(s) SubLingual every 8 hours  budesonide 160 MICROgram(s)/formoterol 4.5 MICROgram(s) Inhaler 2 Puff(s) Inhalation two times a day  buPROPion XL . 150 milliGRAM(s) Oral daily  calcitriol   Capsule 0.5 MICROGram(s) Oral daily  calcium acetate 1334 milliGRAM(s) Oral three times a day with meals  dextrose 5%. 1000 milliLiter(s) (50 mL/Hr) IV Continuous <Continuous>  dextrose 50% Injectable 12.5 Gram(s) IV Push once  dextrose 50% Injectable 25 Gram(s) IV Push once  dextrose 50% Injectable 25 Gram(s) IV Push once  heparin  Injectable 5000 Unit(s) SubCutaneous every 8 hours  insulin glargine Injectable (LANTUS) 7 Unit(s) SubCutaneous at bedtime  insulin lispro (HumaLOG) corrective regimen sliding scale   SubCutaneous three times a day before meals  insulin lispro (HumaLOG) corrective regimen sliding scale   SubCutaneous at bedtime  metoprolol tartrate 25 milliGRAM(s) Oral two times a day  montelukast 10 milliGRAM(s) Oral daily  predniSONE   Tablet 10 milliGRAM(s) Oral daily  sertraline 100 milliGRAM(s) Oral daily    MEDICATIONS  (PRN):  acetaminophen   Tablet .. 650 milliGRAM(s) Oral every 4 hours PRN Mild Pain (1 - 3)  dextrose 40% Gel 15 Gram(s) Oral once PRN Blood Glucose LESS THAN 70 milliGRAM(s)/deciliter  glucagon  Injectable 1 milliGRAM(s) IntraMuscular once PRN Glucose LESS THAN 70 milligrams/deciliter  guaiFENesin   Syrup  (Sugar-Free) 200 milliGRAM(s) Oral every 6 hours PRN Cough      LABS: All Labs Reviewed:  04-05    141  |  106  |  68<H>  ----------------------------<  165<H>  4.6   |  21<L>  |  2.72<H>    Ca    6.9<L>      05 Apr 2020 08:09

## 2020-04-06 LAB
ALBUMIN SERPL ELPH-MCNC: 2.2 G/DL — LOW (ref 3.3–5)
ALP SERPL-CCNC: 109 U/L — SIGNIFICANT CHANGE UP (ref 40–120)
ALT FLD-CCNC: 33 U/L — SIGNIFICANT CHANGE UP (ref 10–45)
ANION GAP SERPL CALC-SCNC: 13 MMOL/L — SIGNIFICANT CHANGE UP (ref 5–17)
AST SERPL-CCNC: 28 U/L — SIGNIFICANT CHANGE UP (ref 10–40)
BILIRUB SERPL-MCNC: 0.3 MG/DL — SIGNIFICANT CHANGE UP (ref 0.2–1.2)
BUN SERPL-MCNC: 72 MG/DL — HIGH (ref 7–23)
CA-I BLD-SCNC: 1.12 MMOL/L — SIGNIFICANT CHANGE UP (ref 1.12–1.3)
CALCIUM SERPL-MCNC: 8.1 MG/DL — LOW (ref 8.4–10.5)
CHLORIDE SERPL-SCNC: 104 MMOL/L — SIGNIFICANT CHANGE UP (ref 96–108)
CO2 SERPL-SCNC: 22 MMOL/L — SIGNIFICANT CHANGE UP (ref 22–31)
CREAT SERPL-MCNC: 2.96 MG/DL — HIGH (ref 0.5–1.3)
GLUCOSE BLDC GLUCOMTR-MCNC: 226 MG/DL — HIGH (ref 70–99)
GLUCOSE BLDC GLUCOMTR-MCNC: 231 MG/DL — HIGH (ref 70–99)
GLUCOSE BLDC GLUCOMTR-MCNC: 243 MG/DL — HIGH (ref 70–99)
GLUCOSE BLDC GLUCOMTR-MCNC: 263 MG/DL — HIGH (ref 70–99)
GLUCOSE SERPL-MCNC: 234 MG/DL — HIGH (ref 70–99)
MAGNESIUM SERPL-MCNC: 1.8 MG/DL — SIGNIFICANT CHANGE UP (ref 1.6–2.6)
POTASSIUM SERPL-MCNC: 5.2 MMOL/L — SIGNIFICANT CHANGE UP (ref 3.5–5.3)
POTASSIUM SERPL-SCNC: 5.2 MMOL/L — SIGNIFICANT CHANGE UP (ref 3.5–5.3)
PROT SERPL-MCNC: 6.5 G/DL — SIGNIFICANT CHANGE UP (ref 6–8.3)
SODIUM SERPL-SCNC: 139 MMOL/L — SIGNIFICANT CHANGE UP (ref 135–145)

## 2020-04-06 PROCEDURE — 99232 SBSQ HOSP IP/OBS MODERATE 35: CPT | Mod: GC

## 2020-04-06 PROCEDURE — 99233 SBSQ HOSP IP/OBS HIGH 50: CPT

## 2020-04-06 RX ORDER — AMLODIPINE BESYLATE 2.5 MG/1
10 TABLET ORAL DAILY
Refills: 0 | Status: DISCONTINUED | OUTPATIENT
Start: 2020-04-06 | End: 2020-04-08

## 2020-04-06 RX ORDER — ACETYLCYSTEINE 200 MG/ML
4 VIAL (ML) MISCELLANEOUS EVERY 6 HOURS
Refills: 0 | Status: COMPLETED | OUTPATIENT
Start: 2020-04-06 | End: 2020-04-07

## 2020-04-06 RX ADMIN — Medication 650 MILLIGRAM(S): at 20:20

## 2020-04-06 RX ADMIN — Medication 3 MILLILITER(S): at 04:10

## 2020-04-06 RX ADMIN — Medication 25 MILLIGRAM(S): at 17:23

## 2020-04-06 RX ADMIN — Medication 3 MILLILITER(S): at 21:41

## 2020-04-06 RX ADMIN — BUPROPION HYDROCHLORIDE 150 MILLIGRAM(S): 150 TABLET, EXTENDED RELEASE ORAL at 11:56

## 2020-04-06 RX ADMIN — Medication 3 DROP(S): at 04:38

## 2020-04-06 RX ADMIN — AMLODIPINE BESYLATE 5 MILLIGRAM(S): 2.5 TABLET ORAL at 04:38

## 2020-04-06 RX ADMIN — CALCITRIOL 0.5 MICROGRAM(S): 0.5 CAPSULE ORAL at 11:56

## 2020-04-06 RX ADMIN — ATORVASTATIN CALCIUM 10 MILLIGRAM(S): 80 TABLET, FILM COATED ORAL at 21:58

## 2020-04-06 RX ADMIN — Medication 1334 MILLIGRAM(S): at 11:57

## 2020-04-06 RX ADMIN — Medication 200 MILLIGRAM(S): at 09:50

## 2020-04-06 RX ADMIN — Medication 3: at 16:38

## 2020-04-06 RX ADMIN — MONTELUKAST 10 MILLIGRAM(S): 4 TABLET, CHEWABLE ORAL at 11:56

## 2020-04-06 RX ADMIN — Medication 3 DROP(S): at 16:35

## 2020-04-06 RX ADMIN — Medication 3 MILLILITER(S): at 15:10

## 2020-04-06 RX ADMIN — SERTRALINE 100 MILLIGRAM(S): 25 TABLET, FILM COATED ORAL at 11:57

## 2020-04-06 RX ADMIN — Medication 2: at 11:55

## 2020-04-06 RX ADMIN — Medication 25 MILLIGRAM(S): at 04:38

## 2020-04-06 RX ADMIN — BUDESONIDE AND FORMOTEROL FUMARATE DIHYDRATE 2 PUFF(S): 160; 4.5 AEROSOL RESPIRATORY (INHALATION) at 21:42

## 2020-04-06 RX ADMIN — HEPARIN SODIUM 5000 UNIT(S): 5000 INJECTION INTRAVENOUS; SUBCUTANEOUS at 21:58

## 2020-04-06 RX ADMIN — Medication 200 MILLIGRAM(S): at 21:58

## 2020-04-06 RX ADMIN — Medication 81 MILLIGRAM(S): at 11:56

## 2020-04-06 RX ADMIN — Medication 1334 MILLIGRAM(S): at 16:35

## 2020-04-06 RX ADMIN — HEPARIN SODIUM 5000 UNIT(S): 5000 INJECTION INTRAVENOUS; SUBCUTANEOUS at 16:31

## 2020-04-06 RX ADMIN — Medication 1334 MILLIGRAM(S): at 08:10

## 2020-04-06 RX ADMIN — HEPARIN SODIUM 5000 UNIT(S): 5000 INJECTION INTRAVENOUS; SUBCUTANEOUS at 04:38

## 2020-04-06 RX ADMIN — Medication 2: at 08:07

## 2020-04-06 RX ADMIN — BUDESONIDE AND FORMOTEROL FUMARATE DIHYDRATE 2 PUFF(S): 160; 4.5 AEROSOL RESPIRATORY (INHALATION) at 08:58

## 2020-04-06 RX ADMIN — Medication 3 MILLILITER(S): at 08:57

## 2020-04-06 RX ADMIN — Medication 10 MILLIGRAM(S): at 04:38

## 2020-04-06 RX ADMIN — Medication 650 MILLIGRAM(S): at 04:38

## 2020-04-06 RX ADMIN — AMLODIPINE BESYLATE 10 MILLIGRAM(S): 2.5 TABLET ORAL at 22:04

## 2020-04-06 RX ADMIN — Medication 3 DROP(S): at 21:58

## 2020-04-06 NOTE — CHART NOTE - NSCHARTNOTEFT_GEN_A_CORE
NUTRITION FOLLOW UP    SOURCE: Patient [)   Family [ ]     other [X ] Medical record    DIET: cons. cho low k+ dash  nepro bid    PATIENT REPORT[ ] nausea  [ ] vomiting [ ] diarrhea [ ] constipation  [ ]chewing problems [ ] swallowing issues  [ ] other: no GI distress    PO INTAKE:  < 50% [X ]   50-75%  [ ]   %  [X]  other : Taking sips nepro w/ encouragement    SOURCE: for PO intake [] Patient [ ] family [ X] chart [ ] staff [ ] other    ENTERAL/PARENTERAL NUTRITION: n/a    CURRENT WEIGHT: 3/28 67 KG.  (weight loss 6 kg. since admission)    PERTINENT LABS:    Date: 04-06  Sodium 139  Potassium 5.2  Glucose Serum 234<H>  BUN 72<H>      Creatinine    ACCUCHECK  POCT Blood Glucose.: 226 mg/dL (06 Apr 2020 07:25)  POCT Blood Glucose.: 176 mg/dL (05 Apr 2020 22:08)  POCT Blood Glucose.: 190 mg/dL (05 Apr 2020 16:55)  POCT Blood Glucose.: 166 mg/dL (05 Apr 2020 12:10)      SKIN: intact, last BM was 4/3,  edema noted right foot    ESTIMATED NEEDS:   [X] no change since previous assessment  [ ] recalculated:     PREVIOUS NUTRITION DIAGNOSIS: addressed    NUTRITION DIAGNOSIS is   [X] ongoing    NEW NUTRITION DIAGNOSIS: [X] not applicable    MONITORING AND EVALUATION:   Current diet order is appropriate and is well tolerated, but will monitor for any changes that may be needed    [X] PO intake [X] Tolerance to diet prescription [X] weights [X] follow up per protocol    NUTRITION RECOMMENDATIONS: reinforce increased po intake incl supplements    RD remains available DAYANA Escoto RD CDE

## 2020-04-06 NOTE — PROGRESS NOTE ADULT - ASSESSMENT
86M w/ PMH of Dementia, DM, HTN, Depression, CKD4, COPD presents to the ED with difficulty breathing and progressive confusion. Admitted delia calle COPD exacerbation.       *SOB likely 2/2 metabolic acidosis, COPD exacerbation - resolved  -Hx diastolic HF pEF- exacerbation does not seem likely  -COVID 19-Negative  -CXR: patchy infiltrates in RLL; will continue to monitor off abx for now  -Continue Albuterol, Symbicort, Nebs   -Continue PO steroids  -Cont Ativan 0.25mg q8 prn  -Cont Guaifenesin, atropine started today by pulm for congestion  -CXR on 3/29 with possible L pleural effusion.   -Appreciate Pulm recs   -d/c supplemental oxygen, trial of oxygen weaning. May require home oxygen if faills. Discussed with CM     *Metabolic encephalopathy: 2/2 metabolic acidosis/multiple electrolytes abnormalities likely 2/2 Denosumab & PABLITO  Improving  Cont to correct Lytes  Trend BMP  Aspiration precautions  Appreciate Renal recs     *PABLITO (Cr 2.8 in May 2019) on CKD stage 4   Cr worsening   encourage PO intake   Nephrology following   Avoid nephrotoxic meds    *Normocytic anemia 2/2 CKD  stable    *Hypocalcemia likely 2/2 Denosumab vs secondary hyperparathyroidism   Prolonged QTc on ECG (566) on admission - improving ( on 3/25)  Continue Rocaltrol Phoslo   Calcium gluconate IV given yesterday, repeat calcium normal, Magnesium normal   Appreciate Renal recs   Trend calcium levels     *Urinary retention  Improved  Cont Flomax    *HTN  Continue Amlodipine, Metoprolol     *DMII  Cont Lantus 7 Units HS   Cont FS/ISS  on steroids     *Depression  Cont Sertraline, Buproprion (home dose buproprion 100mg BID, pharmacy here only has 150mg XL)    *DVT ppx  UFH    *Goals of care: DNR/DNI    Dispo: Family refusing KRUPA due to possibility of exposure to COVID. CM in process of getting necessary DME for family.   DME: Roller Walker, Commode and Wheelchair       Pt seen, case and plan discussed with Dr. Meredith Pelletier  PGY-1

## 2020-04-06 NOTE — PROGRESS NOTE ADULT - ASSESSMENT
86M w/ PMH of Dementia, DM, HTN, Depression, CKD4, COPD presents to the ED with difficulty breathing and progressive confusion. Admitted delia calle COPD exacerbation.   Pulm:  Continues with wet cough.  Mucomyst neb treatments added    *SOB likely 2/2 metabolic acidosis, COPD exacerbation - resolved  -Hx diastolic HF pEF- exacerbation does not seem likely  -COVID 19-Negativ  -CXR: patchy infiltrates in RLL; will continue to monitor off abx for now  -Continue Albuterol, Symbicort, Nebs   -Continue PO steroids  -Cont Ativan 0.25mg q8 prn  -Cont Guaifenesin, atropine started today by pulm for congestion  -CXR on 3/29 with possible L pleural effusion.   -Appreciate Pulm recs   -d/c supplemental oxygen, trial of oxygen weaning. May require home oxygen if faills. Discussed with CM     *Metabolic encephalopathy: 2/2 metabolic acidosis/multiple electrolytes abnormalities likely 2/2 Denosumab & PABLITO  Improving  Cont to correct Lytes  Trend BMP  Aspiration precautions  Appreciate Renal recs     *PABLITO (Cr 2.8 in May 2019) on CKD stage 4   Cr worsening   encourage PO intake   Nephrology following   Avoid nephrotoxic meds    *Normocytic anemia 2/2 CKD  stable    *Hypocalcemia likely 2/2 Denosumab vs secondary hyperparathyroidism   Prolonged QTc on ECG (566) on admission - improving ( on 3/25)  Continue Rocaltrol Phoslo   Calcium gluconate IV given yesterday, repeat calcium normal, Magnesium normal   Appreciate Renal recs   Trend calcium levels     *Urinary retention  Improved  Cont Flomax    *HTN  Continue Amlodipine, Metoprolol     *DMII  Cont Lantus 7 Units HS   Cont FS/ISS  on steroids     *Depression  Cont Sertraline, Buproprion (home dose buproprion 100mg BID, pharmacy here only has 150mg XL)    *DVT ppx  UFH    *Goals of care: DNR/DNI    Dispo: Family refusing KRUPA due to possibility of exposure to COVID. CM in process of getting necessary DME for family.   DME: Roller Walker, Commode and Wheelchair       Pt seen, case and plan discussed with Dr. Meredith Pelletier  PGY-1

## 2020-04-06 NOTE — PROGRESS NOTE ADULT - SUBJECTIVE AND OBJECTIVE BOX
No distress    Vital Signs Last 24 Hrs  T(C): 36.7 (04-06-20 @ 07:49), Max: 36.8 (04-05-20 @ 21:16)  T(F): 98.1 (04-06-20 @ 07:49), Max: 98.2 (04-05-20 @ 21:16)  HR: 97 (04-06-20 @ 07:49) (66 - 97)  BP: 163/62 (04-06-20 @ 07:49) (163/62 - 168/82)  RR: 18 (04-06-20 @ 07:49) (17 - 18)  SpO2: 97% (04-06-20 @ 07:49) (96% - 97%)    Card S1S2  Lungs b/l air entry  Abd soft, NT, ND  Extr no edema                                                                       06 Apr 2020 07:25    139    |  104    |  72     ----------------------------<  234    5.2     |  22     |  2.96     Ca    8.1        06 Apr 2020 07:25  Mg     1.8       06 Apr 2020 07:25    TPro  6.5    /  Alb  2.2    /  TBili  0.3    /  DBili  x      /  AST  28     /  ALT  33     /  AlkPhos  109    06 Apr 2020 07:25    LIVER FUNCTIONS - ( 06 Apr 2020 07:25 )  Alb: 2.2 g/dL / Pro: 6.5 g/dL / ALK PHOS: 109 U/L / ALT: 33 U/L / AST: 28 U/L / GGT: x           acetaminophen   Tablet .. 650 milliGRAM(s) Oral every 4 hours PRN  albuterol/ipratropium for Nebulization 3 milliLiter(s) Nebulizer every 6 hours  amLODIPine   Tablet 5 milliGRAM(s) Oral daily  aspirin enteric coated 81 milliGRAM(s) Oral daily  atorvastatin 10 milliGRAM(s) Oral at bedtime  atropine 1% Ophthalmic Solution for SubLingual Use 3 Drop(s) SubLingual every 8 hours  budesonide 160 MICROgram(s)/formoterol 4.5 MICROgram(s) Inhaler 2 Puff(s) Inhalation two times a day  buPROPion XL . 150 milliGRAM(s) Oral daily  calcitriol   Capsule 0.5 MICROGram(s) Oral daily  calcium acetate 1334 milliGRAM(s) Oral three times a day with meals  dextrose 40% Gel 15 Gram(s) Oral once PRN  dextrose 5%. 1000 milliLiter(s) IV Continuous <Continuous>  dextrose 50% Injectable 12.5 Gram(s) IV Push once  dextrose 50% Injectable 25 Gram(s) IV Push once  dextrose 50% Injectable 25 Gram(s) IV Push once  glucagon  Injectable 1 milliGRAM(s) IntraMuscular once PRN  guaiFENesin   Syrup  (Sugar-Free) 200 milliGRAM(s) Oral every 6 hours PRN  heparin  Injectable 5000 Unit(s) SubCutaneous every 8 hours  insulin glargine Injectable (LANTUS) 7 Unit(s) SubCutaneous at bedtime  insulin lispro (HumaLOG) corrective regimen sliding scale   SubCutaneous three times a day before meals  insulin lispro (HumaLOG) corrective regimen sliding scale   SubCutaneous at bedtime  metoprolol tartrate 25 milliGRAM(s) Oral two times a day  montelukast 10 milliGRAM(s) Oral daily  predniSONE   Tablet 10 milliGRAM(s) Oral daily  sertraline 100 milliGRAM(s) Oral daily    A/P:    Hx COPD, CKD 4 (Cr 2.8 - 5/18/19), metastatic prostate ca  COPD exacerbation, possibly lung mets (COVID negative)  Severe hypocalcemia w/QT prolongation on adm due to Denosumab (got on 3/12) exacerbated by bone disorder of advanced CKD w/low PTH and hyperphosphatemia, intermittent hypomagnesemia  Phoslo 2 tabs PO TID  Calcitriol PO  Ca corrected  CKD at baseline  Low K, low Na diet  Avoid Denosumab in the future given CKD 4  No objection to d/c-d on current meds from renal pov  DNR/I    [phone]

## 2020-04-06 NOTE — PROGRESS NOTE ADULT - SUBJECTIVE AND OBJECTIVE BOX
Follow-up Pulm Progress Note    Rod Avalos MD  [phone]    No new respiratory events overnight.  Continues with wet cough     Medications:  Vital Signs Last 24 Hrs  T(C): 36.7 (06 Apr 2020 07:49), Max: 36.8 (05 Apr 2020 21:16)  T(F): 98.1 (06 Apr 2020 07:49), Max: 98.2 (05 Apr 2020 21:16)  HR: 97 (06 Apr 2020 07:49) (88 - 97)  BP: 163/62 (06 Apr 2020 07:49) (163/62 - 168/82)  BP(mean): --  RR: 18 (06 Apr 2020 07:49) (17 - 18)  SpO2: 92% (06 Apr 2020 15:07) (92% - 97%)            LABS:    04-06    139  |  104  |  72<H>  ----------------------------<  234<H>  5.2   |  22  |  2.96<H>    Ca    8.1<L>      06 Apr 2020 07:25  Mg     1.8     04-06    TPro  6.5  /  Alb  2.2<L>  /  TBili  0.3  /  DBili  x   /  AST  28  /  ALT  33  /  AlkPhos  109  04-06              CULTURES:        Physical Examination:  PULM: Scattered coarse bs  CVS: Regular rate and rhythm, no murmurs, rubs, or gallops  ABD: Soft, non-tender      RADIOLOGY REVIEWED  CXR:< from: Xray Chest 1 View- PORTABLE-Routine (04.03.20 @ 09:02) >  PROCEDURE DATE:  04/03/2020        INTERPRETATION:  EXAMINATION: XR CHEST    CLINICAL INDICATION: continued productive cough    TECHNIQUE: Frontal radiograph of the chest was obtained.    COMPARISON: 3/29/2020.    FINDINGS:     Cardiac silhouette not well evaluated. Left pleural effusion. Bibasilar linear opacities. Lungs otherwise clear. Question sclerotic foci within the right first rib and the left clavicle.    IMPRESSION:   Left pleural effusion. Bibasilar linear opacities may represent atelectasis.    Question sclerotic foci within the right first rib and the left clavicle. CT chest may be helpful for complete evaluation    < end of copied text >      CT chest:    TTE:

## 2020-04-06 NOTE — PROGRESS NOTE ADULT - SUBJECTIVE AND OBJECTIVE BOX
SUBJECTIVE: Pt seen and evaluated at bedside. Weak and frail appearing. Still complains of cough, non-productive in nature, denies any significant improvement in symptoms. Vitals reviewed. No other complaints.       REVIEW OF SYSTEMS:  CONSTITUTIONAL: + weakness: no fevers or chills  EYES/ENT: occasional blurred vision;  No vertigo or throat pain   NECK: No pain or stiffness  RESPIRATORY: + cough: no wheezing, hemoptysis; No shortness of breath  CARDIOVASCULAR: No chest pain or palpitations  GASTROINTESTINAL: No abdominal or epigastric pain. No nausea, vomiting, or hematemesis; No diarrhea or constipation. No melena or hematochezia.  GENITOURINARY: No dysuria, frequency or hematuria  NEUROLOGICAL: No numbness or weakness  SKIN: No itching, burning, rashes, or lesions   All other review of systems is negative unless indicated above    Vital Signs Last 24 Hrs  T(C): 36.7 (06 Apr 2020 07:49), Max: 36.8 (05 Apr 2020 21:16)  T(F): 98.1 (06 Apr 2020 07:49), Max: 98.2 (05 Apr 2020 21:16)  HR: 97 (06 Apr 2020 07:49) (66 - 97)  BP: 163/62 (06 Apr 2020 07:49) (163/62 - 168/82)  BP(mean): --  RR: 18 (06 Apr 2020 07:49) (17 - 18)  SpO2: 97% (06 Apr 2020 07:49) (96% - 97%)    I&O's Detail        CAPILLARY BLOOD GLUCOSE      POCT Blood Glucose.: 226 mg/dL (06 Apr 2020 07:25)  POCT Blood Glucose.: 176 mg/dL (05 Apr 2020 22:08)  POCT Blood Glucose.: 190 mg/dL (05 Apr 2020 16:55)  POCT Blood Glucose.: 166 mg/dL (05 Apr 2020 12:10)        PHYSICAL EXAM:  Constitutional: NAD, awake and alert, frail  HEENT: PERR, EOMI, hard of hearing, MMM,  Respiratory: +coarse breath sounds noted   Cardiovascular: S1 and S2, regular rate and rhythm, no Murmurs, gallops or rubs  Gastrointestinal: Bowel Sounds present, soft, nontender, nondistended, no guarding, no rebound  Extremities: No peripheral edema  Vascular: 2+ peripheral pulses  Neurological: A/O x 3, no focal deficits  Musculoskeletal: 4-/5 strength b/l upper and lower extremities  Skin: No rashes    MEDICATIONS  (STANDING):  albuterol/ipratropium for Nebulization 3 milliLiter(s) Nebulizer every 6 hours  amLODIPine   Tablet 5 milliGRAM(s) Oral daily  aspirin enteric coated 81 milliGRAM(s) Oral daily  atorvastatin 10 milliGRAM(s) Oral at bedtime  atropine 1% Ophthalmic Solution for SubLingual Use 3 Drop(s) SubLingual every 8 hours  budesonide 160 MICROgram(s)/formoterol 4.5 MICROgram(s) Inhaler 2 Puff(s) Inhalation two times a day  buPROPion XL . 150 milliGRAM(s) Oral daily  calcitriol   Capsule 0.5 MICROGram(s) Oral daily  calcium acetate 1334 milliGRAM(s) Oral three times a day with meals  dextrose 5%. 1000 milliLiter(s) (50 mL/Hr) IV Continuous <Continuous>  dextrose 50% Injectable 12.5 Gram(s) IV Push once  dextrose 50% Injectable 25 Gram(s) IV Push once  dextrose 50% Injectable 25 Gram(s) IV Push once  heparin  Injectable 5000 Unit(s) SubCutaneous every 8 hours  insulin glargine Injectable (LANTUS) 7 Unit(s) SubCutaneous at bedtime  insulin lispro (HumaLOG) corrective regimen sliding scale   SubCutaneous three times a day before meals  insulin lispro (HumaLOG) corrective regimen sliding scale   SubCutaneous at bedtime  metoprolol tartrate 25 milliGRAM(s) Oral two times a day  montelukast 10 milliGRAM(s) Oral daily  predniSONE   Tablet 10 milliGRAM(s) Oral daily  sertraline 100 milliGRAM(s) Oral daily    MEDICATIONS  (PRN):  acetaminophen   Tablet .. 650 milliGRAM(s) Oral every 4 hours PRN Mild Pain (1 - 3)  dextrose 40% Gel 15 Gram(s) Oral once PRN Blood Glucose LESS THAN 70 milliGRAM(s)/deciliter  glucagon  Injectable 1 milliGRAM(s) IntraMuscular once PRN Glucose LESS THAN 70 milligrams/deciliter  guaiFENesin   Syrup  (Sugar-Free) 200 milliGRAM(s) Oral every 6 hours PRN Cough        Labs   04-06    139  |  104  |  72<H>  ----------------------------<  234<H>  5.2   |  22  |  2.96<H>    Ca    8.1<L>      06 Apr 2020 07:25  Mg     1.8     04-06    TPro  6.5  /  Alb  2.2<L>  /  TBili  0.3  /  DBili  x   /  AST  28  /  ALT  33  /  AlkPhos  109  04-06

## 2020-04-07 LAB
ALBUMIN SERPL ELPH-MCNC: 1.9 G/DL — LOW (ref 3.3–5)
ALP SERPL-CCNC: 92 U/L — SIGNIFICANT CHANGE UP (ref 40–120)
ALT FLD-CCNC: 29 U/L — SIGNIFICANT CHANGE UP (ref 10–45)
ANION GAP SERPL CALC-SCNC: 12 MMOL/L — SIGNIFICANT CHANGE UP (ref 5–17)
ANION GAP SERPL CALC-SCNC: 13 MMOL/L — SIGNIFICANT CHANGE UP (ref 5–17)
AST SERPL-CCNC: 17 U/L — SIGNIFICANT CHANGE UP (ref 10–40)
BILIRUB SERPL-MCNC: 0.2 MG/DL — SIGNIFICANT CHANGE UP (ref 0.2–1.2)
BUN SERPL-MCNC: 76 MG/DL — HIGH (ref 7–23)
BUN SERPL-MCNC: 81 MG/DL — HIGH (ref 7–23)
CALCIUM SERPL-MCNC: 7.3 MG/DL — LOW (ref 8.4–10.5)
CALCIUM SERPL-MCNC: 7.9 MG/DL — LOW (ref 8.4–10.5)
CHLORIDE SERPL-SCNC: 106 MMOL/L — SIGNIFICANT CHANGE UP (ref 96–108)
CHLORIDE SERPL-SCNC: 106 MMOL/L — SIGNIFICANT CHANGE UP (ref 96–108)
CO2 SERPL-SCNC: 20 MMOL/L — LOW (ref 22–31)
CO2 SERPL-SCNC: 23 MMOL/L — SIGNIFICANT CHANGE UP (ref 22–31)
CREAT SERPL-MCNC: 2.96 MG/DL — HIGH (ref 0.5–1.3)
CREAT SERPL-MCNC: 3.28 MG/DL — HIGH (ref 0.5–1.3)
GLUCOSE BLDC GLUCOMTR-MCNC: 166 MG/DL — HIGH (ref 70–99)
GLUCOSE BLDC GLUCOMTR-MCNC: 174 MG/DL — HIGH (ref 70–99)
GLUCOSE BLDC GLUCOMTR-MCNC: 195 MG/DL — HIGH (ref 70–99)
GLUCOSE BLDC GLUCOMTR-MCNC: 220 MG/DL — HIGH (ref 70–99)
GLUCOSE SERPL-MCNC: 173 MG/DL — HIGH (ref 70–99)
GLUCOSE SERPL-MCNC: 192 MG/DL — HIGH (ref 70–99)
HCT VFR BLD CALC: 25.7 % — LOW (ref 39–50)
HGB BLD-MCNC: 8.1 G/DL — LOW (ref 13–17)
MAGNESIUM SERPL-MCNC: 1.8 MG/DL — SIGNIFICANT CHANGE UP (ref 1.6–2.6)
MCHC RBC-ENTMCNC: 29 PG — SIGNIFICANT CHANGE UP (ref 27–34)
MCHC RBC-ENTMCNC: 31.5 GM/DL — LOW (ref 32–36)
MCV RBC AUTO: 92.1 FL — SIGNIFICANT CHANGE UP (ref 80–100)
NRBC # BLD: 0 /100 WBCS — SIGNIFICANT CHANGE UP (ref 0–0)
PHOSPHATE SERPL-MCNC: 3 MG/DL — SIGNIFICANT CHANGE UP (ref 2.5–4.5)
PLATELET # BLD AUTO: 424 K/UL — HIGH (ref 150–400)
POTASSIUM SERPL-MCNC: 4.3 MMOL/L — SIGNIFICANT CHANGE UP (ref 3.5–5.3)
POTASSIUM SERPL-MCNC: 4.8 MMOL/L — SIGNIFICANT CHANGE UP (ref 3.5–5.3)
POTASSIUM SERPL-SCNC: 4.3 MMOL/L — SIGNIFICANT CHANGE UP (ref 3.5–5.3)
POTASSIUM SERPL-SCNC: 4.8 MMOL/L — SIGNIFICANT CHANGE UP (ref 3.5–5.3)
PROT SERPL-MCNC: 5.9 G/DL — LOW (ref 6–8.3)
RBC # BLD: 2.79 M/UL — LOW (ref 4.2–5.8)
RBC # FLD: 13.8 % — SIGNIFICANT CHANGE UP (ref 10.3–14.5)
SODIUM SERPL-SCNC: 139 MMOL/L — SIGNIFICANT CHANGE UP (ref 135–145)
SODIUM SERPL-SCNC: 141 MMOL/L — SIGNIFICANT CHANGE UP (ref 135–145)
WBC # BLD: 16.7 K/UL — HIGH (ref 3.8–10.5)
WBC # FLD AUTO: 16.7 K/UL — HIGH (ref 3.8–10.5)

## 2020-04-07 PROCEDURE — 71045 X-RAY EXAM CHEST 1 VIEW: CPT | Mod: 26

## 2020-04-07 PROCEDURE — 99233 SBSQ HOSP IP/OBS HIGH 50: CPT

## 2020-04-07 PROCEDURE — 99232 SBSQ HOSP IP/OBS MODERATE 35: CPT | Mod: GC

## 2020-04-07 RX ORDER — ALPRAZOLAM 0.25 MG
0.25 TABLET ORAL DAILY
Refills: 0 | Status: DISCONTINUED | OUTPATIENT
Start: 2020-04-07 | End: 2020-04-09

## 2020-04-07 RX ORDER — SODIUM CHLORIDE 9 MG/ML
500 INJECTION, SOLUTION INTRAVENOUS ONCE
Refills: 0 | Status: COMPLETED | OUTPATIENT
Start: 2020-04-07 | End: 2020-04-07

## 2020-04-07 RX ORDER — ACETYLCYSTEINE 200 MG/ML
2 VIAL (ML) MISCELLANEOUS THREE TIMES A DAY
Refills: 0 | Status: COMPLETED | OUTPATIENT
Start: 2020-04-07 | End: 2020-04-08

## 2020-04-07 RX ADMIN — Medication 1: at 18:07

## 2020-04-07 RX ADMIN — SODIUM CHLORIDE 500 MILLILITER(S): 9 INJECTION, SOLUTION INTRAVENOUS at 21:22

## 2020-04-07 RX ADMIN — BUDESONIDE AND FORMOTEROL FUMARATE DIHYDRATE 2 PUFF(S): 160; 4.5 AEROSOL RESPIRATORY (INHALATION) at 09:18

## 2020-04-07 RX ADMIN — Medication 3 MILLILITER(S): at 16:25

## 2020-04-07 RX ADMIN — Medication 3 MILLILITER(S): at 09:19

## 2020-04-07 RX ADMIN — Medication 25 MILLIGRAM(S): at 18:08

## 2020-04-07 RX ADMIN — Medication 3 DROP(S): at 22:56

## 2020-04-07 RX ADMIN — HEPARIN SODIUM 5000 UNIT(S): 5000 INJECTION INTRAVENOUS; SUBCUTANEOUS at 05:36

## 2020-04-07 RX ADMIN — Medication 2 MILLILITER(S): at 16:24

## 2020-04-07 RX ADMIN — Medication 1334 MILLIGRAM(S): at 08:33

## 2020-04-07 RX ADMIN — Medication 3 MILLILITER(S): at 05:31

## 2020-04-07 RX ADMIN — MONTELUKAST 10 MILLIGRAM(S): 4 TABLET, CHEWABLE ORAL at 12:48

## 2020-04-07 RX ADMIN — INSULIN GLARGINE 7 UNIT(S): 100 INJECTION, SOLUTION SUBCUTANEOUS at 00:15

## 2020-04-07 RX ADMIN — BUPROPION HYDROCHLORIDE 150 MILLIGRAM(S): 150 TABLET, EXTENDED RELEASE ORAL at 12:48

## 2020-04-07 RX ADMIN — HEPARIN SODIUM 5000 UNIT(S): 5000 INJECTION INTRAVENOUS; SUBCUTANEOUS at 14:12

## 2020-04-07 RX ADMIN — Medication 25 MILLIGRAM(S): at 05:36

## 2020-04-07 RX ADMIN — Medication 1334 MILLIGRAM(S): at 18:06

## 2020-04-07 RX ADMIN — CALCITRIOL 0.5 MICROGRAM(S): 0.5 CAPSULE ORAL at 12:47

## 2020-04-07 RX ADMIN — HEPARIN SODIUM 5000 UNIT(S): 5000 INJECTION INTRAVENOUS; SUBCUTANEOUS at 22:56

## 2020-04-07 RX ADMIN — Medication 4 MILLILITER(S): at 09:19

## 2020-04-07 RX ADMIN — INSULIN GLARGINE 7 UNIT(S): 100 INJECTION, SOLUTION SUBCUTANEOUS at 21:25

## 2020-04-07 RX ADMIN — Medication 3 DROP(S): at 14:12

## 2020-04-07 RX ADMIN — AMLODIPINE BESYLATE 10 MILLIGRAM(S): 2.5 TABLET ORAL at 05:36

## 2020-04-07 RX ADMIN — Medication 10 MILLIGRAM(S): at 08:33

## 2020-04-07 RX ADMIN — Medication 81 MILLIGRAM(S): at 12:47

## 2020-04-07 RX ADMIN — Medication 1: at 12:46

## 2020-04-07 RX ADMIN — Medication 1: at 08:33

## 2020-04-07 RX ADMIN — Medication 1334 MILLIGRAM(S): at 12:47

## 2020-04-07 RX ADMIN — SERTRALINE 100 MILLIGRAM(S): 25 TABLET, FILM COATED ORAL at 12:49

## 2020-04-07 RX ADMIN — BUDESONIDE AND FORMOTEROL FUMARATE DIHYDRATE 2 PUFF(S): 160; 4.5 AEROSOL RESPIRATORY (INHALATION) at 22:21

## 2020-04-07 RX ADMIN — Medication 2 MILLILITER(S): at 22:20

## 2020-04-07 RX ADMIN — Medication 3 MILLILITER(S): at 22:20

## 2020-04-07 RX ADMIN — Medication 3 DROP(S): at 05:36

## 2020-04-07 NOTE — PROGRESS NOTE ADULT - SUBJECTIVE AND OBJECTIVE BOX
No distress    Vital Signs Last 24 Hrs  T(C): 37.1 (04-07-20 @ 09:29), Max: 37.1 (04-07-20 @ 09:29)  T(F): 98.7 (04-07-20 @ 09:29), Max: 98.7 (04-07-20 @ 09:29)  HR: 80 (04-07-20 @ 09:29) (80 - 104)  BP: 125/77 (04-07-20 @ 09:29) (118/63 - 152/81)  RR: 18 (04-07-20 @ 09:29) (18 - 18)  SpO2: 97% (04-07-20 @ 09:29) (92% - 97%)    Card S1S2  Lungs b/l air entry  Abd soft, NT, ND  Extr tr edema                                                                                           8.1    16.70 )-----------( 424      ( 07 Apr 2020 07:05 )             25.7     07 Apr 2020 07:05    139    |  106    |  76     ----------------------------<  173    4.3     |  20     |  2.96     Ca    7.3        07 Apr 2020 07:05  Phos  3.0       07 Apr 2020 07:05  Mg     1.8       07 Apr 2020 07:05    TPro  5.9    /  Alb  1.9    /  TBili  0.2    /  DBili  x      /  AST  17     /  ALT  29     /  AlkPhos  92     07 Apr 2020 07:05    LIVER FUNCTIONS - ( 07 Apr 2020 07:05 )  Alb: 1.9 g/dL / Pro: 5.9 g/dL / ALK PHOS: 92 U/L / ALT: 29 U/L / AST: 17 U/L / GGT: x           acetaminophen   Tablet .. 650 milliGRAM(s) Oral every 4 hours PRN  albuterol/ipratropium for Nebulization 3 milliLiter(s) Nebulizer every 6 hours  amLODIPine   Tablet 10 milliGRAM(s) Oral daily  aspirin enteric coated 81 milliGRAM(s) Oral daily  atorvastatin 10 milliGRAM(s) Oral at bedtime  atropine 1% Ophthalmic Solution for SubLingual Use 3 Drop(s) SubLingual every 8 hours  budesonide 160 MICROgram(s)/formoterol 4.5 MICROgram(s) Inhaler 2 Puff(s) Inhalation two times a day  buPROPion XL . 150 milliGRAM(s) Oral daily  calcitriol   Capsule 0.5 MICROGram(s) Oral daily  calcium acetate 1334 milliGRAM(s) Oral three times a day with meals  dextrose 40% Gel 15 Gram(s) Oral once PRN  dextrose 5%. 1000 milliLiter(s) IV Continuous <Continuous>  dextrose 50% Injectable 12.5 Gram(s) IV Push once  dextrose 50% Injectable 25 Gram(s) IV Push once  dextrose 50% Injectable 25 Gram(s) IV Push once  glucagon  Injectable 1 milliGRAM(s) IntraMuscular once PRN  guaiFENesin   Syrup  (Sugar-Free) 200 milliGRAM(s) Oral every 6 hours PRN  heparin  Injectable 5000 Unit(s) SubCutaneous every 8 hours  insulin glargine Injectable (LANTUS) 7 Unit(s) SubCutaneous at bedtime  insulin lispro (HumaLOG) corrective regimen sliding scale   SubCutaneous three times a day before meals  insulin lispro (HumaLOG) corrective regimen sliding scale   SubCutaneous at bedtime  metoprolol tartrate 25 milliGRAM(s) Oral two times a day  montelukast 10 milliGRAM(s) Oral daily  predniSONE   Tablet 10 milliGRAM(s) Oral daily  sertraline 100 milliGRAM(s) Oral daily    A/P:    Hx COPD, CKD 4 (Cr 2.8 - 5/18/19), metastatic prostate ca  COPD exacerbation, possibly lung mets (COVID negative)  Severe hypocalcemia w/QT prolongation on adm due to Denosumab (got on 3/12) exacerbated by bone disorder of advanced CKD w/low PTH and hyperphosphatemia, intermittent hypomagnesemia  Continue Phoslo 2 tabs PO TID and Calcitriol PO  CKD 4 near baseline  Low K, low Na diet  Avoid Denosumab in the future given CKD 4 risk of severe recurrent hypocalcemia  F/u lytes, renal fx  DNR/I    [phone]

## 2020-04-07 NOTE — PROGRESS NOTE ADULT - SUBJECTIVE AND OBJECTIVE BOX
Follow-up Pulm Progress Note    Rod Avalos MD  [phone]    No new respiratory events overnight.  Cough continues    Medications:  Vital Signs Last 24 Hrs  T(C): 37.1 (07 Apr 2020 09:29), Max: 37.1 (07 Apr 2020 09:29)  T(F): 98.7 (07 Apr 2020 09:29), Max: 98.7 (07 Apr 2020 09:29)  HR: 80 (07 Apr 2020 09:29) (80 - 104)  BP: 125/77 (07 Apr 2020 09:29) (118/63 - 152/81)  BP(mean): --  RR: 18 (07 Apr 2020 09:29) (18 - 18)  SpO2: 97% (07 Apr 2020 09:29) (93% - 97%)            LABS:                        8.1    16.70 )-----------( 424      ( 07 Apr 2020 07:05 )             25.7     04-07    139  |  106  |  76<H>  ----------------------------<  173<H>  4.3   |  20<L>  |  2.96<H>    Ca    7.3<L>      07 Apr 2020 07:05  Phos  3.0     04-07  Mg     1.8     04-07    TPro  5.9<L>  /  Alb  1.9<L>  /  TBili  0.2  /  DBili  x   /  AST  17  /  ALT  29  /  AlkPhos  92  04-07              CULTURES:        Physical Examination:  PULM: scattered coarse bs  CVS: Regular rate and rhythm, no murmurs, rubs, or gallops  ABD: Soft, non-tender  EXT:  No clubbing, cyanosis, or edema    RADIOLOGY REVIEWED  CXR:    CT chest:    TTE:

## 2020-04-07 NOTE — PROGRESS NOTE ADULT - ASSESSMENT
86M w/ PMH of Dementia, DM, HTN, Depression, CKD4, COPD presents to the ED with difficulty breathing and progressive confusion. Admitted delia calle COPD exacerbation.       *SOB likely 2/2 metabolic acidosis, COPD exacerbation - resolved  -Hx diastolic HF pEF- exacerbation does not seem likely  -COVID 19-Negative  -CXR: patchy infiltrates in RLL; will continue to monitor off abx for now  -Continue Albuterol, Symbicort, Nebs   -Continue PO steroids  -Cont Ativan 0.25mg q8 prn  -Cont Guaifenesin, atropine started today by pulm for congestion  -CXR on 3/29 with possible L pleural effusion.   -Appreciate Pulm recs   -trial of oxygen weaning failed. Would need home oxygen. Discussed with CM     *Metabolic encephalopathy: 2/2 metabolic acidosis/multiple electrolytes abnormalities likely 2/2 Denosumab & PABLITO  Improving  Cont to correct Lytes  Trend BMP  Aspiration precautions  Appreciate Renal recs     *PABLITO (Cr 2.8 in May 2019) on CKD stage 4   Cr worsening   encourage PO intake   Nephrology following   Avoid nephrotoxic meds    *Normocytic anemia 2/2 CKD  stable    *Hypocalcemia likely 2/2 Denosumab vs secondary hyperparathyroidism   Prolonged QTc on ECG (566) on admission - improving ( on 3/25)  Continue Rocaltrol Phoslo   Calcium gluconate IV given yesterday, repeat calcium normal, Magnesium normal   Appreciate Renal recs   Trend calcium levels     *Urinary retention  Improved  Cont Flomax    *HTN  Continue Amlodipine, Metoprolol     *DMII  Cont Lantus 7 Units HS   Cont FS/ISS  on steroids     *Depression  Cont Sertraline, Buproprion (home dose buproprion 100mg BID, pharmacy here only has 150mg XL)    *DVT ppx  UFH    *Goals of care: DNR/DNI    Dispo:CM in process of getting necessary DME for family.   DME: Roller Walker, Commode and Wheelchair     #PT failed oxygen weaning trial, would need home oxygen, discussed with CM. Planned discharge once home o2 is set-up      Pt seen, case and plan discussed with Dr. Meredith Pelletier  PGY-1

## 2020-04-07 NOTE — PROGRESS NOTE ADULT - SUBJECTIVE AND OBJECTIVE BOX
SUBJECTIVE: Pt seen and evaluated at bedside. Weak and frail appearing. Still complains of cough, non-productive in nature, denies any significant improvement in symptoms. Vitals reviewed.   Hypoxic on RA while in bed with SpO2: 86%, normal with supplemental oxygen.     REVIEW OF SYSTEMS:  CONSTITUTIONAL: + weakness: no fevers or chills  EYES/ENT: occasional blurred vision;  No vertigo or throat pain   NECK: No pain or stiffness  RESPIRATORY: + cough: no wheezing, hemoptysis; No shortness of breath  CARDIOVASCULAR: No chest pain or palpitations  GASTROINTESTINAL: No abdominal or epigastric pain. No nausea, vomiting, or hematemesis; No diarrhea or constipation. No melena or hematochezia.  GENITOURINARY: No dysuria, frequency or hematuria  NEUROLOGICAL: No numbness or weakness  SKIN: No itching, burning, rashes, or lesions   All other review of systems is negative unless indicated above    Vital Signs Last 24 Hrs  T(C): 37.1 (07 Apr 2020 09:29), Max: 37.1 (07 Apr 2020 09:29)  T(F): 98.7 (07 Apr 2020 09:29), Max: 98.7 (07 Apr 2020 09:29)  HR: 80 (07 Apr 2020 09:29) (80 - 104)  BP: 125/77 (07 Apr 2020 09:29) (118/63 - 152/81)  RR: 18 (07 Apr 2020 09:29) (18 - 18)  SpO2: 97% (07 Apr 2020 09:29) (92% - 97%)    I&O's Detail        CAPILLARY BLOOD GLUCOSE      POCT Blood Glucose.: 166 mg/dL (07 Apr 2020 11:16)  POCT Blood Glucose.: 174 mg/dL (07 Apr 2020 08:27)  POCT Blood Glucose.: 243 mg/dL (06 Apr 2020 21:32)  POCT Blood Glucose.: 263 mg/dL (06 Apr 2020 16:32)        PHYSICAL EXAM:  Constitutional: NAD, awake and alert, frail  HEENT: PERR, EOMI, hard of hearing, MMM,  Respiratory: +coarse breath sounds noted   Cardiovascular: S1 and S2, regular rate and rhythm, no Murmurs, gallops or rubs  Gastrointestinal: Bowel Sounds present, soft, nontender, nondistended, no guarding, no rebound  Extremities: No peripheral edema  Vascular: 2+ peripheral pulses  Neurological: A/O x 3, no focal deficits  Musculoskeletal: 4-/5 strength b/l upper and lower extremities  Skin: No rashes    MEDICATIONS  (STANDING):  albuterol/ipratropium for Nebulization 3 milliLiter(s) Nebulizer every 6 hours  ALPRAZolam 0.25 milliGRAM(s) Oral daily  amLODIPine   Tablet 10 milliGRAM(s) Oral daily  aspirin enteric coated 81 milliGRAM(s) Oral daily  atorvastatin 10 milliGRAM(s) Oral at bedtime  atropine 1% Ophthalmic Solution for SubLingual Use 3 Drop(s) SubLingual every 8 hours  budesonide 160 MICROgram(s)/formoterol 4.5 MICROgram(s) Inhaler 2 Puff(s) Inhalation two times a day  buPROPion XL . 150 milliGRAM(s) Oral daily  calcitriol   Capsule 0.5 MICROGram(s) Oral daily  calcium acetate 1334 milliGRAM(s) Oral three times a day with meals  dextrose 5%. 1000 milliLiter(s) (50 mL/Hr) IV Continuous <Continuous>  dextrose 50% Injectable 12.5 Gram(s) IV Push once  dextrose 50% Injectable 25 Gram(s) IV Push once  dextrose 50% Injectable 25 Gram(s) IV Push once  heparin  Injectable 5000 Unit(s) SubCutaneous every 8 hours  insulin glargine Injectable (LANTUS) 7 Unit(s) SubCutaneous at bedtime  insulin lispro (HumaLOG) corrective regimen sliding scale   SubCutaneous three times a day before meals  insulin lispro (HumaLOG) corrective regimen sliding scale   SubCutaneous at bedtime  metoprolol tartrate 25 milliGRAM(s) Oral two times a day  montelukast 10 milliGRAM(s) Oral daily  predniSONE   Tablet 10 milliGRAM(s) Oral daily  sertraline 100 milliGRAM(s) Oral daily    MEDICATIONS  (PRN):  acetaminophen   Tablet .. 650 milliGRAM(s) Oral every 4 hours PRN Mild Pain (1 - 3)  dextrose 40% Gel 15 Gram(s) Oral once PRN Blood Glucose LESS THAN 70 milliGRAM(s)/deciliter  glucagon  Injectable 1 milliGRAM(s) IntraMuscular once PRN Glucose LESS THAN 70 milligrams/deciliter  guaiFENesin   Syrup  (Sugar-Free) 200 milliGRAM(s) Oral every 6 hours PRN Cough      Labs   04-06                        8.1    16.70 )-----------( 424      ( 07 Apr 2020 07:05 )             25.7   04-07    139  |  106  |  76<H>  ----------------------------<  173<H>  4.3   |  20<L>  |  2.96<H>    Ca    7.3<L>      07 Apr 2020 07:05  Phos  3.0     04-07  Mg     1.8     04-07    TPro  5.9<L>  /  Alb  1.9<L>  /  TBili  0.2  /  DBili  x   /  AST  17  /  ALT  29  /  AlkPhos  92  04-07

## 2020-04-07 NOTE — PROGRESS NOTE ADULT - ASSESSMENT
86M w/ PMH of Dementia, DM, HTN, Depression, CKD4, COPD presents to the ED with difficulty breathing and progressive confusion. Admitted delia calle COPD exacerbation.       *SOB likely 2/2 metabolic acidosis, COPD exacerbation - resolved  -Hx diastolic HF pEF- exacerbation does not seem likely  -COVID 19-Negative  -CXR: patchy infiltrates in RLL; will continue to monitor off abx for now  -Continue Albuterol, Symbicort, Nebs   -Continue PO steroids  -Cont Ativan 0.25mg q8 prn  -Cont Guaifenesin, atropine started today by pulm for congestion  -CXR on 3/29 with possible L pleural effusion.      -trial of oxygen weaning failed. Would need home oxygen. Discussed with CM     *Metabolic encephalopathy: 2/2 metabolic acidosis/multiple electrolytes abnormalities likely 2/2 Denosumab & PABLITO  Improving  Cont to correct Lytes  Trend BMP  Aspiration precautions  Appreciate Renal recs     *PABLITO (Cr 2.8 in May 2019) on CKD stage 4   Cr worsening   encourage PO intake   Nephrology following   Avoid nephrotoxic meds    *Normocytic anemia 2/2 CKD  stable    *Hypocalcemia likely 2/2 Denosumab vs secondary hyperparathyroidism   Prolonged QTc on ECG (566) on admission - improving ( on 3/25)  Continue Rocaltrol Phoslo   Calcium gluconate IV given yesterday, repeat calcium normal, Magnesium normal   Appreciate Renal recs   Trend calcium levels     *Urinary retention  Improved  Cont Flomax    *HTN  Continue Amlodipine, Metoprolol     *DMII  Cont Lantus 7 Units HS   Cont FS/ISS  on steroids     *Depression  Cont Sertraline, Buproprion (home dose buproprion 100mg BID, pharmacy here only has 150mg XL)    *DVT ppx  UFH    *Goals of care: DNR/DNI    Dispo:CM in process of getting necessary DME for family.   DME: Roller Walker, Commode and Wheelchair     #PT failed oxygen weaning trial, would need home oxygen, discussed with CM. Planned discharge once home o2 is set-up

## 2020-04-07 NOTE — CHART NOTE - NSCHARTNOTEFT_GEN_A_CORE
Called by RN to bedside around 2030 on this 85 yo patient admitted with COPD exacerbation (now off of Abx) and metabolic encephalopathy now with  increased lethargy and abnormal gurgling sounds. As per RN and PCA, pt is generally talking, making requests and following commands. PCA tried feeding him earlier and he cheeked food in his mouth. PCA suctioned food from mouth, uncertain if pt aspirated. Now he is just making moaning sounds and not able to appreciate or pinpoint any pain. Pt was seen and examined at bedside. He is making moaning sounds and is unable to respond to questions in a comprehensible manner. Unable to obtain review of systems due to current mental state. Pt tested negative for COVID 19.     VS  /78     T98.7 F     RR 18      O2 sat 96% on 4L O2 via NC    FSG    Gen: frail, elderly man lying in bed moaning, not following commands, responsive to noxious stimuli  HEENT: EOMI, dry mucous membranes  Card: S1, S2 tachycardic  Pulm: Transmitted upper breath sounds, Coughing intermittently, Unable to assess posterior lung fields  Extr: no pedal edema or calf tenderness  Neuro: responsive to noxious stimuli, arousable, moaning, unable to answer questions comprehensibly    A/P: 85 yo M as described above admitted for COPD exacerbation and metabolic encephalopathy (now off of Abx) now with increased lethargy, poor PO intake and abnormal gurgling sounds.     - STAT CXR  - STAT BMP  - STAT 500 cc bolus LR   - Will consider initiating antibiotics  - Will continue to monitor patient status.    Discussed above with RN Noelle Lakhani MD PGY3 Called by RN to bedside around 2030 on this 85 yo patient admitted with COPD exacerbation (now off of Abx) and metabolic encephalopathy now with  increased lethargy and abnormal gurgling sounds. As per RN and PCA, pt is generally talking, making requests and following commands. PCA tried feeding him earlier and he cheeked food in his mouth. PCA suctioned food from mouth, uncertain if pt aspirated. Now he is just making moaning sounds and not able to appreciate or pinpoint any pain. Pt was seen and examined at bedside. He is making moaning sounds and is unable to respond to questions in a comprehensible manner. Unable to obtain review of systems due to current mental state. Pt tested negative for COVID 19.     VS  /78     T98.7 F     RR 18      O2 sat 96% on 4L O2 via NC    FSG    Gen: frail, elderly man lying in bed moaning, not following commands, responsive to noxious stimuli  HEENT: EOMI, dry mucous membranes  Card: S1, S2 tachycardic  Pulm: Transmitted upper breath sounds, Coughing intermittently, Unable to assess posterior lung fields  Extr: no pedal edema or calf tenderness  Neuro: responsive to noxious stimuli, arousable, moaning, unable to answer questions comprehensibly    A/P: 85 yo M as described above admitted for COPD exacerbation and metabolic encephalopathy (now off of Abx) now with increased lethargy, poor PO intake and abnormal gurgling sounds. Currently hemodynamically stable.    - STAT CXR - preliminary read appears improved from previous CXR on 4/3/20  - STAT BMP  - STAT 500 cc bolus LR   - Will consider initiating antibiotics  - Will continue to monitor patient status overnight.    Discussed above with RN Noelle Lakhani MD PGY3 Called by RN to bedside around 2030 on this 85 yo patient admitted with COPD exacerbation (now off of Abx) and metabolic encephalopathy now with  increased lethargy and abnormal gurgling sounds. As per RN and PCA, pt is generally talking, making requests and following commands. PCA tried feeding him earlier and he cheeked food in his mouth. PCA suctioned food from mouth, uncertain if pt aspirated. Now he is just making moaning sounds and not able to appreciate or pinpoint any pain. Pt was seen and examined at bedside. He is making moaning sounds and is unable to respond to questions in a comprehensible manner. Unable to obtain review of systems due to current mental state. Pt tested negative for COVID 19.     VS  /78     T98.7 F     RR 18      O2 sat 96% on 4L O2 via NC        Gen: frail, elderly man lying in bed moaning, not following commands, responsive to noxious stimuli  HEENT: EOMI, dry mucous membranes  Card: S1, S2 tachycardic  Pulm: Transmitted upper breath sounds, Coughing intermittently, Unable to assess posterior lung fields  Extr: no pedal edema or calf tenderness  Neuro: responsive to noxious stimuli, arousable, moaning, unable to answer questions comprehensibly    A/P: 85 yo M as described above admitted for COPD exacerbation and metabolic encephalopathy (now off of Abx) now with increased lethargy, poor PO intake and abnormal gurgling sounds. Currently hemodynamically stable.    - STAT CXR - preliminary read appears improved from previous CXR on 4/3/20  - STAT BMP  - STAT 500 cc bolus LR   - NPO for now, pending speech/swallow eval  - Will consider initiating antibiotics  - Will continue to monitor patient status overnight.    Discussed above with RN Noelle Lakhani MD PGY3 Called by VASILE Drake to bedside around 2030 on this 87 yo patient admitted with COPD exacerbation (now off of Abx) and metabolic encephalopathy now with  increased lethargy and abnormal gurgling sounds. As per RN and PCA, pt is generally talking, making requests and following commands. PCA tried feeding him earlier and he cheeked food in his mouth. PCA suctioned food from mouth, uncertain if pt aspirated. Now he is just making moaning sounds and not able to appreciate or pinpoint any pain. Pt was seen and examined at bedside. He is making moaning sounds and is unable to respond to questions in a comprehensible manner. Unable to obtain review of systems due to current mental state. Pt tested negative for COVID 19.     VS  /78     T98.7 F     RR 18      O2 sat 96% on 4L O2 via NC        Gen: frail, elderly man lying in bed moaning, not following commands, responsive to noxious stimuli  HEENT: EOMI, dry mucous membranes  Card: S1, S2 tachycardic  Pulm: Transmitted upper breath sounds, Coughing intermittently, Unable to assess posterior lung fields  Extr: no pedal edema or calf tenderness  Neuro: responsive to noxious stimuli, arousable, moaning, unable to answer questions comprehensibly    A/P: 87 yo M as described above admitted for COPD exacerbation and metabolic encephalopathy (now off of Abx) now with increased lethargy, poor PO intake and abnormal gurgling sounds. Currently hemodynamically stable.    - STAT CXR - preliminary read appears improved from previous CXR on 4/3/20  - STAT BMP  - STAT 500 cc bolus LR   - NPO for now, pending speech/swallow eval  - Will consider initiating antibiotics  - Will continue to monitor patient status overnight.    Discussed above with VASILE Lakhani MD PGY3 Called by VASILE Drake to bedside around 2030 on this 85 yo patient admitted with COPD exacerbation (now off of Abx) and metabolic encephalopathy now with  increased lethargy and abnormal gurgling sounds. As per RN and PCA, pt is generally talking, making requests and following commands. PCA tried feeding him earlier and he cheeked food in his mouth. PCA suctioned food from mouth, uncertain if pt aspirated. Now he is just making moaning sounds and not able to appreciate or pinpoint any pain. Pt was seen and examined at bedside. He is making moaning sounds and is unable to respond to questions in a comprehensible manner. Unable to obtain review of systems due to current mental state. Pt tested negative for COVID 19.     VS  /78     T98.7 F     RR 18      O2 sat 96% on 4L O2 via NC        Gen: frail, elderly man lying in bed moaning, not following commands, responsive to noxious stimuli  HEENT: EOMI, dry mucous membranes  Card: S1, S2 tachycardic  Pulm: Transmitted upper breath sounds, Coughing intermittently, Unable to assess posterior lung fields  Extr: no pedal edema or calf tenderness  Neuro: responsive to noxious stimuli, arousable, moaning, unable to answer questions comprehensibly    A/P: 85 yo M as described above admitted for COPD exacerbation and metabolic encephalopathy (now off of Abx) now with increased lethargy, poor PO intake and abnormal gurgling sounds. Currently hemodynamically stable.    - STAT CXR - preliminary read appears improved from previous CXR on 4/3/20.   - STAT BMP - Cr trended up to 3.28 from 2.96 earlier in day, likely due to decreased PO intake  - STAT 500 cc bolus LR   - Duoneb Tx as scheduled  - NPO for now, pending speech/swallow eval  - Will consider initiating antibiotics with anaerobic coverage for possible aspiration if not improving  - Will continue to monitor patient status overnight.  - F/U official CXR read    Addendum: Pt began to improve following bolus and Duoneb Tx. Pt remains afebrile and hemodynamically stable.    Discussed above with VASILE Lakhani MD PGY3

## 2020-04-07 NOTE — PROVIDER CONTACT NOTE (CHANGE IN STATUS NOTIFICATION) - ASSESSMENT
Pt has increased work of breathing, diffuse rhonchi at this time, on nonrebreather at this time
Lethargic, confused but arousable.

## 2020-04-08 LAB
ANION GAP SERPL CALC-SCNC: 17 MMOL/L — SIGNIFICANT CHANGE UP (ref 5–17)
BUN SERPL-MCNC: 80 MG/DL — HIGH (ref 7–23)
CA-I BLD-SCNC: 1.08 MMOL/L — LOW (ref 1.12–1.3)
CALCIUM SERPL-MCNC: 7.6 MG/DL — LOW (ref 8.4–10.5)
CHLORIDE SERPL-SCNC: 107 MMOL/L — SIGNIFICANT CHANGE UP (ref 96–108)
CO2 SERPL-SCNC: 21 MMOL/L — LOW (ref 22–31)
CREAT SERPL-MCNC: 3.33 MG/DL — HIGH (ref 0.5–1.3)
GLUCOSE BLDC GLUCOMTR-MCNC: 186 MG/DL — HIGH (ref 70–99)
GLUCOSE BLDC GLUCOMTR-MCNC: 199 MG/DL — HIGH (ref 70–99)
GLUCOSE BLDC GLUCOMTR-MCNC: 232 MG/DL — HIGH (ref 70–99)
GLUCOSE BLDC GLUCOMTR-MCNC: 255 MG/DL — HIGH (ref 70–99)
GLUCOSE SERPL-MCNC: 188 MG/DL — HIGH (ref 70–99)
MAGNESIUM SERPL-MCNC: 2 MG/DL — SIGNIFICANT CHANGE UP (ref 1.6–2.6)
POTASSIUM SERPL-MCNC: 4.3 MMOL/L — SIGNIFICANT CHANGE UP (ref 3.5–5.3)
POTASSIUM SERPL-SCNC: 4.3 MMOL/L — SIGNIFICANT CHANGE UP (ref 3.5–5.3)
SODIUM SERPL-SCNC: 145 MMOL/L — SIGNIFICANT CHANGE UP (ref 135–145)

## 2020-04-08 PROCEDURE — 99233 SBSQ HOSP IP/OBS HIGH 50: CPT

## 2020-04-08 PROCEDURE — 99232 SBSQ HOSP IP/OBS MODERATE 35: CPT | Mod: GC

## 2020-04-08 RX ORDER — PIPERACILLIN AND TAZOBACTAM 4; .5 G/20ML; G/20ML
3.38 INJECTION, POWDER, LYOPHILIZED, FOR SOLUTION INTRAVENOUS EVERY 12 HOURS
Refills: 0 | Status: DISCONTINUED | OUTPATIENT
Start: 2020-04-08 | End: 2020-04-10

## 2020-04-08 RX ORDER — PIPERACILLIN AND TAZOBACTAM 4; .5 G/20ML; G/20ML
3.38 INJECTION, POWDER, LYOPHILIZED, FOR SOLUTION INTRAVENOUS ONCE
Refills: 0 | Status: COMPLETED | OUTPATIENT
Start: 2020-04-08 | End: 2020-04-08

## 2020-04-08 RX ORDER — PIPERACILLIN AND TAZOBACTAM 4; .5 G/20ML; G/20ML
2.25 INJECTION, POWDER, LYOPHILIZED, FOR SOLUTION INTRAVENOUS ONCE
Refills: 0 | Status: DISCONTINUED | OUTPATIENT
Start: 2020-04-08 | End: 2020-04-08

## 2020-04-08 RX ORDER — CEFUROXIME AXETIL 250 MG
500 TABLET ORAL DAILY
Refills: 0 | Status: DISCONTINUED | OUTPATIENT
Start: 2020-04-08 | End: 2020-04-08

## 2020-04-08 RX ORDER — CEFUROXIME AXETIL 250 MG
500 TABLET ORAL EVERY 12 HOURS
Refills: 0 | Status: DISCONTINUED | OUTPATIENT
Start: 2020-04-08 | End: 2020-04-08

## 2020-04-08 RX ORDER — PIPERACILLIN AND TAZOBACTAM 4; .5 G/20ML; G/20ML
2.25 INJECTION, POWDER, LYOPHILIZED, FOR SOLUTION INTRAVENOUS EVERY 8 HOURS
Refills: 0 | Status: DISCONTINUED | OUTPATIENT
Start: 2020-04-08 | End: 2020-04-08

## 2020-04-08 RX ADMIN — Medication 3 DROP(S): at 05:31

## 2020-04-08 RX ADMIN — MONTELUKAST 10 MILLIGRAM(S): 4 TABLET, CHEWABLE ORAL at 12:38

## 2020-04-08 RX ADMIN — Medication 3 MILLILITER(S): at 04:51

## 2020-04-08 RX ADMIN — PIPERACILLIN AND TAZOBACTAM 200 GRAM(S): 4; .5 INJECTION, POWDER, LYOPHILIZED, FOR SOLUTION INTRAVENOUS at 20:15

## 2020-04-08 RX ADMIN — BUDESONIDE AND FORMOTEROL FUMARATE DIHYDRATE 2 PUFF(S): 160; 4.5 AEROSOL RESPIRATORY (INHALATION) at 08:48

## 2020-04-08 RX ADMIN — Medication 1334 MILLIGRAM(S): at 18:22

## 2020-04-08 RX ADMIN — BUDESONIDE AND FORMOTEROL FUMARATE DIHYDRATE 2 PUFF(S): 160; 4.5 AEROSOL RESPIRATORY (INHALATION) at 21:09

## 2020-04-08 RX ADMIN — Medication 25 MILLIGRAM(S): at 18:23

## 2020-04-08 RX ADMIN — Medication 3 DROP(S): at 22:54

## 2020-04-08 RX ADMIN — Medication 3 MILLILITER(S): at 15:51

## 2020-04-08 RX ADMIN — Medication 3 DROP(S): at 14:42

## 2020-04-08 RX ADMIN — Medication 500 MILLIGRAM(S): at 12:36

## 2020-04-08 RX ADMIN — HEPARIN SODIUM 5000 UNIT(S): 5000 INJECTION INTRAVENOUS; SUBCUTANEOUS at 22:54

## 2020-04-08 RX ADMIN — INSULIN GLARGINE 7 UNIT(S): 100 INJECTION, SOLUTION SUBCUTANEOUS at 22:54

## 2020-04-08 RX ADMIN — SERTRALINE 100 MILLIGRAM(S): 25 TABLET, FILM COATED ORAL at 12:38

## 2020-04-08 RX ADMIN — HEPARIN SODIUM 5000 UNIT(S): 5000 INJECTION INTRAVENOUS; SUBCUTANEOUS at 05:32

## 2020-04-08 RX ADMIN — HEPARIN SODIUM 5000 UNIT(S): 5000 INJECTION INTRAVENOUS; SUBCUTANEOUS at 15:14

## 2020-04-08 RX ADMIN — ATORVASTATIN CALCIUM 10 MILLIGRAM(S): 80 TABLET, FILM COATED ORAL at 22:53

## 2020-04-08 RX ADMIN — Medication 2: at 12:38

## 2020-04-08 RX ADMIN — Medication 3: at 18:22

## 2020-04-08 RX ADMIN — Medication 3 MILLILITER(S): at 08:48

## 2020-04-08 RX ADMIN — BUPROPION HYDROCHLORIDE 150 MILLIGRAM(S): 150 TABLET, EXTENDED RELEASE ORAL at 12:36

## 2020-04-08 RX ADMIN — Medication 1: at 07:00

## 2020-04-08 RX ADMIN — Medication 2 MILLILITER(S): at 08:47

## 2020-04-08 RX ADMIN — CALCITRIOL 0.5 MICROGRAM(S): 0.5 CAPSULE ORAL at 12:38

## 2020-04-08 NOTE — SWALLOW BEDSIDE ASSESSMENT ADULT - SWALLOW EVAL: RECOMMENDED DIET
Puree, thin liquids, 1:1 assist, head upright during meals, PO intake only when patient alert and willing.

## 2020-04-08 NOTE — PROVIDER CONTACT NOTE (OTHER) - SITUATION
Patient found in bed, moaning making gurgling sounds, with increased lethargy. As per AM RN MD made aware of patient status.

## 2020-04-08 NOTE — SWALLOW BEDSIDE ASSESSMENT ADULT - SLP GENERAL OBSERVATIONS
patient required arousal, alert, participative, garbled speech, increased speech intelligibility s/p oral care

## 2020-04-08 NOTE — PROGRESS NOTE ADULT - SUBJECTIVE AND OBJECTIVE BOX
Follow-up Pulm/pall Progress Note    Rod Avalos MD  [phone]    No new respiratory events overnight.  Denies SOB/CP. Less cough.    Medications:  Vital Signs Last 24 Hrs  T(C): 38 (08 Apr 2020 11:21), Max: 38 (08 Apr 2020 11:21)  T(F): 100.4 (08 Apr 2020 11:21), Max: 100.4 (08 Apr 2020 11:21)  HR: 118 (08 Apr 2020 15:53) (100 - 120)  BP: 136/73 (08 Apr 2020 05:14) (136/73 - 145/81)  BP(mean): --  RR: 17 (08 Apr 2020 05:14) (17 - 18)  SpO2: 94% (08 Apr 2020 15:53) (93% - 96%)          04-07 @ 07:01  -  04-08 @ 07:00  --------------------------------------------------------  IN: 500 mL / OUT: 0 mL / NET: 500 mL        LABS:                        8.1    16.70 )-----------( 424      ( 07 Apr 2020 07:05 )             25.7     04-08    145  |  107  |  80<H>  ----------------------------<  188<H>  4.3   |  21<L>  |  3.33<H>    Ca    7.6<L>      08 Apr 2020 05:00  Phos  3.0     04-07  Mg     2.0     04-08    TPro  5.9<L>  /  Alb  1.9<L>  /  TBili  0.2  /  DBili  x   /  AST  17  /  ALT  29  /  AlkPhos  92  04-07              CULTURES:        Physical Examination:  PULM: bilat coarse bs  CVS: Regular rate and rhythm, no murmurs, rubs, or gallops  ABD: Soft, non-tender  EXT:  No clubbing, cyanosis, or edema    RADIOLOGY REVIEWED  CXR:    CT chest:    TTE:

## 2020-04-08 NOTE — SWALLOW BEDSIDE ASSESSMENT ADULT - SWALLOW EVAL: DIAGNOSIS
Oropharyngeal dysphagia superimposed by overall functional status. At bedside, patient with garbled speech, at times, intelligible, follows simple commands. Oral care provided prior to PO intake as patient with dry oral cavity. Patient with lateral spillage indicative of poor oral control, delayed oral transit time, pharyngeal trigger suspected to be delayed, reduced hyolaryngeal elevation via palpation, no clinical s/s of penetration/aspiration during all trials. Trial conservative diet of puree/thin liquids due to overall mentation as it is a change from day prior.

## 2020-04-08 NOTE — SWALLOW BEDSIDE ASSESSMENT ADULT - SLP PERTINENT HISTORY OF CURRENT PROBLEM
86M w/ PMH of Dementia, DM, HTN, Depression, CKD4, COPD presents to the ED with difficulty breathing and progressive confusion. Admitted wBogdan calle COPD exacerbation.

## 2020-04-08 NOTE — PROGRESS NOTE ADULT - SUBJECTIVE AND OBJECTIVE BOX
SUBJECTIVE: Pt seen and evaluated at bedside. Weak and frail appearing. Still complains of cough, non-productive in nature, denies any significant improvement in symptoms. Vitals reviewed.   Hypoxic on RA while in bed with SpO2: 86%, normal with supplemental oxygen.     REVIEW OF SYSTEMS:  CONSTITUTIONAL: + weakness: no fevers or chills  EYES/ENT: occasional blurred vision;  No vertigo or throat pain   NECK: No pain or stiffness  RESPIRATORY: + cough: no wheezing, hemoptysis; No shortness of breath  CARDIOVASCULAR: No chest pain or palpitations  GASTROINTESTINAL: No abdominal or epigastric pain. No nausea, vomiting, or hematemesis; No diarrhea or constipation. No melena or hematochezia.  GENITOURINARY: No dysuria, frequency or hematuria  NEUROLOGICAL: No numbness or weakness  SKIN: No itching, burning, rashes, or lesions   All other review of systems is negative unless indicated above    Vital Signs Last 24 Hrs  T(C): 38 (08 Apr 2020 11:21), Max: 38 (08 Apr 2020 11:21)  T(F): 100.4 (08 Apr 2020 11:21), Max: 100.4 (08 Apr 2020 11:21)  HR: 118 (08 Apr 2020 15:53) (100 - 118)  BP: 136/73 (08 Apr 2020 05:14) (136/73 - 136/73)  RR: 17 (08 Apr 2020 05:14) (17 - 17)  SpO2: 94% (08 Apr 2020 15:53) (93% - 95%)    I&O's Detail        CAPILLARY BLOOD GLUCOSE      POCT Blood Glucose.: 166 mg/dL (07 Apr 2020 11:16)  POCT Blood Glucose.: 174 mg/dL (07 Apr 2020 08:27)  POCT Blood Glucose.: 243 mg/dL (06 Apr 2020 21:32)  POCT Blood Glucose.: 263 mg/dL (06 Apr 2020 16:32)        PHYSICAL EXAM:  Constitutional: NAD, awake and alert, frail  HEENT: PERR, EOMI, hard of hearing, MMM,  Respiratory: +coarse breath sounds noted   Cardiovascular: S1 and S2, regular rate and rhythm, no Murmurs, gallops or rubs  Gastrointestinal: Bowel Sounds present, soft, nontender, nondistended, no guarding, no rebound  Extremities: No peripheral edema  Vascular: 2+ peripheral pulses  Neurological: A/O x 3, no focal deficits  Musculoskeletal: 4-/5 strength b/l upper and lower extremities  Skin: No rashes    MEDICATIONS  (STANDING):  albuterol/ipratropium for Nebulization 3 milliLiter(s) Nebulizer every 6 hours  ALPRAZolam 0.25 milliGRAM(s) Oral daily  amLODIPine   Tablet 10 milliGRAM(s) Oral daily  aspirin enteric coated 81 milliGRAM(s) Oral daily  atorvastatin 10 milliGRAM(s) Oral at bedtime  atropine 1% Ophthalmic Solution for SubLingual Use 3 Drop(s) SubLingual every 8 hours  budesonide 160 MICROgram(s)/formoterol 4.5 MICROgram(s) Inhaler 2 Puff(s) Inhalation two times a day  buPROPion XL . 150 milliGRAM(s) Oral daily  calcitriol   Capsule 0.5 MICROGram(s) Oral daily  calcium acetate 1334 milliGRAM(s) Oral three times a day with meals  dextrose 5%. 1000 milliLiter(s) (50 mL/Hr) IV Continuous <Continuous>  dextrose 50% Injectable 12.5 Gram(s) IV Push once  dextrose 50% Injectable 25 Gram(s) IV Push once  dextrose 50% Injectable 25 Gram(s) IV Push once  heparin  Injectable 5000 Unit(s) SubCutaneous every 8 hours  insulin glargine Injectable (LANTUS) 7 Unit(s) SubCutaneous at bedtime  insulin lispro (HumaLOG) corrective regimen sliding scale   SubCutaneous three times a day before meals  insulin lispro (HumaLOG) corrective regimen sliding scale   SubCutaneous at bedtime  metoprolol tartrate 25 milliGRAM(s) Oral two times a day  montelukast 10 milliGRAM(s) Oral daily  predniSONE   Tablet 10 milliGRAM(s) Oral daily  sertraline 100 milliGRAM(s) Oral daily    MEDICATIONS  (PRN):  acetaminophen   Tablet .. 650 milliGRAM(s) Oral every 4 hours PRN Mild Pain (1 - 3)  dextrose 40% Gel 15 Gram(s) Oral once PRN Blood Glucose LESS THAN 70 milliGRAM(s)/deciliter  glucagon  Injectable 1 milliGRAM(s) IntraMuscular once PRN Glucose LESS THAN 70 milligrams/deciliter  guaiFENesin   Syrup  (Sugar-Free) 200 milliGRAM(s) Oral every 6 hours PRN Cough      Labs                         8.1    16.70 )-----------( 424      ( 07 Apr 2020 07:05 )             25.7   04-08    145  |  107  |  80<H>  ----------------------------<  188<H>  4.3   |  21<L>  |  3.33<H>    Ca    7.6<L>      08 Apr 2020 05:00  Phos  3.0     04-07  Mg     2.0     04-08    TPro  5.9<L>  /  Alb  1.9<L>  /  TBili  0.2  /  DBili  x   /  AST  17  /  ALT  29  /  AlkPhos  92  04-07

## 2020-04-08 NOTE — PROGRESS NOTE ADULT - ASSESSMENT
86M w/ PMH of Dementia, DM, HTN, Depression, CKD4, COPD presents to the ED with difficulty breathing and progressive confusion. Admitted delia calle COPD exacerbation.       *SOB likely 2/2 metabolic acidosis, COPD exacerbation - resolved  -Hx diastolic HF pEF- exacerbation does not seem likely  -COVID 19-Negative  -CXR: patchy infiltrates in RLL; will continue to monitor off abx for now  -Continue Albuterol, Symbicort, Nebs   -Continue PO steroids  -Cont Ativan 0.25mg q8 prn  -Cont Guaifenesin, atropine started today by pulm for congestion  -CXR on 3/29 with possible L pleural effusion.   -Appreciate Pulm recs   -trial of oxygen weaning failed. Would need home oxygen. Discussed with CM       #PNA  -on Zosyn      *Metabolic encephalopathy: 2/2 metabolic acidosis/multiple electrolytes abnormalities likely 2/2 Denosumab & PABLITO  Improving  Cont to correct Lytes  Trend BMP  Aspiration precautions  Appreciate Renal recs     *PABLITO (Cr 2.8 in May 2019) on CKD stage 4   Cr worsening   encourage PO intake   Nephrology following   Avoid nephrotoxic meds    *Normocytic anemia 2/2 CKD  stable    *Hypocalcemia likely 2/2 Denosumab vs secondary hyperparathyroidism   Prolonged QTc on ECG (566) on admission - improving ( on 3/25)  Continue Rocaltrol Phoslo   Calcium gluconate IV given yesterday, repeat calcium normal, Magnesium normal   Appreciate Renal recs   Trend calcium levels     *Urinary retention  Improved  Cont Flomax    *HTN  Continue Amlodipine, Metoprolol     *DMII  Cont Lantus 7 Units HS   Cont FS/ISS  on steroids     *Depression  Cont Sertraline, Buproprion (home dose buproprion 100mg BID, pharmacy here only has 150mg XL)    *DVT ppx  UFH    *Goals of care: DNR/DNI    Dispo:CM in process of getting necessary DME for family.   DME: Roller Walker, Commode and Wheelchair     #PT failed oxygen weaning trial, would need home oxygen, discussed with CM. Planned discharge once home o2 is set-up      Pt seen, case and plan discussed with Dr. Meredith Pelletier  PGY-1 86M w/ PMH of Dementia, DM, HTN, Depression, CKD4, COPD presents to the ED with difficulty breathing and progressive confusion. Admitted delia calle COPD exacerbation.       *SOB likely 2/2 metabolic acidosis, COPD exacerbation - resolved  -Hx diastolic HF pEF- exacerbation does not seem likely  -COVID 19-Negative  -CXR: patchy infiltrates in RLL; will continue to monitor off abx for now  -Continue Albuterol, Symbicort, Nebs   -Continue PO steroids  -Cont Ativan 0.25mg q8 prn  -Cont Guaifenesin, atropine started today by pulm for congestion  -CXR on 3/29 with possible L pleural effusion.   -Appreciate Pulm recs   -trial of oxygen weaning failed. Would need home oxygen. Discussed with CM       #PNA  -on Zosyn  -recent CXR showed infiltrates   -will restest for COVID-19    *Metabolic encephalopathy: 2/2 metabolic acidosis/multiple electrolytes abnormalities likely 2/2 Denosumab & PABLITO  Improving  Cont to correct Lytes  Trend BMP  Aspiration precautions  Appreciate Renal recs     *PABLITO (Cr 2.8 in May 2019) on CKD stage 4   Cr worsening   encourage PO intake   Nephrology following   Avoid nephrotoxic meds    *Normocytic anemia 2/2 CKD  stable    *Hypocalcemia likely 2/2 Denosumab vs secondary hyperparathyroidism   Prolonged QTc on ECG (566) on admission - improving ( on 3/25)  Continue Rocaltrol Phoslo   Calcium gluconate IV given yesterday, repeat calcium normal, Magnesium normal   Appreciate Renal recs   Trend calcium levels     *Urinary retention  Improved  Cont Flomax    *HTN  Continue Amlodipine, Metoprolol     *DMII  Cont Lantus 7 Units HS   Cont FS/ISS  on steroids     *Depression  Cont Sertraline, Buproprion (home dose buproprion 100mg BID, pharmacy here only has 150mg XL)    *DVT ppx  UFH    *Goals of care: DNR/DNI    Dispo:CM in process of getting necessary DME for family.   DME: Roller Walker, Commode and Wheelchair     #PT failed oxygen weaning trial, would need home oxygen, discussed with CM. Planned discharge once home o2 is set-up      Pt seen, case and plan discussed with Dr. Meredith Pelletier  PGY-1

## 2020-04-08 NOTE — SWALLOW BEDSIDE ASSESSMENT ADULT - SWALLOW EVAL: RECOMMENDED FEEDING/EATING TECHNIQUES
alternate food with liquid/position upright (90 degrees)/crush medication (when feasible)/small sips/bites

## 2020-04-08 NOTE — SWALLOW BEDSIDE ASSESSMENT ADULT - COMMENTS
WBC: 16.70  CXR: 4/7: Basilar atelectasis with small infiltrates. Possible bone metastases. Clinical correlation is suggested. did not assess chewable solids due to current mentation

## 2020-04-08 NOTE — PROVIDER CONTACT NOTE (OTHER) - ACTION/TREATMENT ORDERED:
MD made aware, no further orders given at this time, pt safety maintained, will continue to monitor
MD assessed patient at bedside, FS done 220, BMP drawn, chest XRay performed

## 2020-04-08 NOTE — PROGRESS NOTE ADULT - SUBJECTIVE AND OBJECTIVE BOX
No distress    Vital Signs Last 24 Hrs  T(C): 38 (04-08-20 @ 11:21), Max: 38 (04-08-20 @ 11:21)  T(F): 100.4 (04-08-20 @ 11:21), Max: 100.4 (04-08-20 @ 11:21)  HR: 100 (04-08-20 @ 08:48) (100 - 120)  BP: 136/73 (04-08-20 @ 05:14) (136/73 - 145/81)  RR: 17 (04-08-20 @ 05:14) (17 - 18)  SpO2: 95% (04-08-20 @ 08:48) (92% - 96%)    Card S1S2  Lungs b/l air entry  Abd soft, NT, ND  Extr tr edema                                                                                           8.1    16.70 )-----------( 424      ( 07 Apr 2020 07:05 )             25.7     08 Apr 2020 05:00    145    |  107    |  80     ----------------------------<  188    4.3     |  21     |  3.33     Ca    7.6        08 Apr 2020 05:00  Phos  3.0       07 Apr 2020 07:05  Mg     2.0       08 Apr 2020 05:00    TPro  5.9    /  Alb  1.9    /  TBili  0.2    /  DBili  x      /  AST  17     /  ALT  29     /  AlkPhos  92     07 Apr 2020 07:05    LIVER FUNCTIONS - ( 07 Apr 2020 07:05 )  Alb: 1.9 g/dL / Pro: 5.9 g/dL / ALK PHOS: 92 U/L / ALT: 29 U/L / AST: 17 U/L / GGT: x           acetaminophen   Tablet .. 650 milliGRAM(s) Oral every 4 hours PRN  albuterol/ipratropium for Nebulization 3 milliLiter(s) Nebulizer every 6 hours  ALPRAZolam 0.25 milliGRAM(s) Oral daily  amLODIPine   Tablet 10 milliGRAM(s) Oral daily  aspirin enteric coated 81 milliGRAM(s) Oral daily  atorvastatin 10 milliGRAM(s) Oral at bedtime  atropine 1% Ophthalmic Solution for SubLingual Use 3 Drop(s) SubLingual every 8 hours  budesonide 160 MICROgram(s)/formoterol 4.5 MICROgram(s) Inhaler 2 Puff(s) Inhalation two times a day  buPROPion XL . 150 milliGRAM(s) Oral daily  calcitriol   Capsule 0.5 MICROGram(s) Oral daily  calcium acetate 1334 milliGRAM(s) Oral three times a day with meals  cefuroxime   Tablet 500 milliGRAM(s) Oral daily  dextrose 40% Gel 15 Gram(s) Oral once PRN  dextrose 5%. 1000 milliLiter(s) IV Continuous <Continuous>  dextrose 50% Injectable 12.5 Gram(s) IV Push once  dextrose 50% Injectable 25 Gram(s) IV Push once  dextrose 50% Injectable 25 Gram(s) IV Push once  glucagon  Injectable 1 milliGRAM(s) IntraMuscular once PRN  guaiFENesin   Syrup  (Sugar-Free) 200 milliGRAM(s) Oral every 6 hours PRN  heparin  Injectable 5000 Unit(s) SubCutaneous every 8 hours  insulin glargine Injectable (LANTUS) 7 Unit(s) SubCutaneous at bedtime  insulin lispro (HumaLOG) corrective regimen sliding scale   SubCutaneous three times a day before meals  insulin lispro (HumaLOG) corrective regimen sliding scale   SubCutaneous at bedtime  metoprolol tartrate 25 milliGRAM(s) Oral two times a day  montelukast 10 milliGRAM(s) Oral daily  predniSONE   Tablet 10 milliGRAM(s) Oral daily  sertraline 100 milliGRAM(s) Oral daily    A/P:    Hx COPD, CKD 4 (Cr 2.8 - 5/18/19), metastatic prostate ca  COPD exacerbation, possibly lung mets (COVID negative 3/23)  Severe hypocalcemia w/QT prolongation on adm due to Denosumab (got on 3/12) exacerbated by bone disorder of advanced CKD w/low PTH and hyperphosphatemia, intermittent hypomagnesemia  Ca improved  Continue Phoslo 2 tabs PO TID and Calcitriol PO  Low K, low Na diet  Avoid Denosumab in the future given CKD 4, risk of severe recurrent hypocalcemia  F/u lytes, renal fx  Fever w/up per medicine  DNR/I    [phone]

## 2020-04-08 NOTE — PROVIDER CONTACT NOTE (OTHER) - BACKGROUND
Patient admitted with COPD exacerbation, tested neg for covid-19, chest XRay with patchy infiltrates in RLL, off antibiotics.

## 2020-04-08 NOTE — SWALLOW BEDSIDE ASSESSMENT ADULT - ASR SWALLOW ASPIRATION MONITOR
gurgly voice/pneumonia/upper respiratory infection/change of breathing pattern/cough/fever/throat clearing/oral hygiene/position upright (90Y)

## 2020-04-09 LAB
ALBUMIN SERPL ELPH-MCNC: 2 G/DL — LOW (ref 3.3–5)
ALP SERPL-CCNC: 95 U/L — SIGNIFICANT CHANGE UP (ref 40–120)
ALT FLD-CCNC: 20 U/L — SIGNIFICANT CHANGE UP (ref 10–45)
ANION GAP SERPL CALC-SCNC: 17 MMOL/L — SIGNIFICANT CHANGE UP (ref 5–17)
ANION GAP SERPL CALC-SCNC: 19 MMOL/L — HIGH (ref 5–17)
AST SERPL-CCNC: 14 U/L — SIGNIFICANT CHANGE UP (ref 10–40)
BASOPHILS # BLD AUTO: 0.05 K/UL — SIGNIFICANT CHANGE UP (ref 0–0.2)
BASOPHILS NFR BLD AUTO: 0.4 % — SIGNIFICANT CHANGE UP (ref 0–2)
BILIRUB SERPL-MCNC: 0.4 MG/DL — SIGNIFICANT CHANGE UP (ref 0.2–1.2)
BUN SERPL-MCNC: 95 MG/DL — HIGH (ref 7–23)
BUN SERPL-MCNC: 95 MG/DL — HIGH (ref 7–23)
CA-I BLD-SCNC: 1.07 MMOL/L — LOW (ref 1.12–1.3)
CALCIUM SERPL-MCNC: 7.6 MG/DL — LOW (ref 8.4–10.5)
CALCIUM SERPL-MCNC: 7.6 MG/DL — LOW (ref 8.4–10.5)
CHLORIDE SERPL-SCNC: 110 MMOL/L — HIGH (ref 96–108)
CHLORIDE SERPL-SCNC: 110 MMOL/L — HIGH (ref 96–108)
CO2 SERPL-SCNC: 17 MMOL/L — LOW (ref 22–31)
CO2 SERPL-SCNC: 19 MMOL/L — LOW (ref 22–31)
CREAT SERPL-MCNC: 3.88 MG/DL — HIGH (ref 0.5–1.3)
CREAT SERPL-MCNC: 3.93 MG/DL — HIGH (ref 0.5–1.3)
EOSINOPHIL # BLD AUTO: 0.23 K/UL — SIGNIFICANT CHANGE UP (ref 0–0.5)
EOSINOPHIL NFR BLD AUTO: 1.9 % — SIGNIFICANT CHANGE UP (ref 0–6)
GLUCOSE BLDC GLUCOMTR-MCNC: 144 MG/DL — HIGH (ref 70–99)
GLUCOSE BLDC GLUCOMTR-MCNC: 184 MG/DL — HIGH (ref 70–99)
GLUCOSE BLDC GLUCOMTR-MCNC: 259 MG/DL — HIGH (ref 70–99)
GLUCOSE SERPL-MCNC: 242 MG/DL — HIGH (ref 70–99)
GLUCOSE SERPL-MCNC: 247 MG/DL — HIGH (ref 70–99)
HCT VFR BLD CALC: 29.8 % — LOW (ref 39–50)
HGB BLD-MCNC: 8.4 G/DL — LOW (ref 13–17)
IMM GRANULOCYTES NFR BLD AUTO: 1.4 % — SIGNIFICANT CHANGE UP (ref 0–1.5)
LYMPHOCYTES # BLD AUTO: 0.88 K/UL — LOW (ref 1–3.3)
LYMPHOCYTES # BLD AUTO: 7.1 % — LOW (ref 13–44)
MAGNESIUM SERPL-MCNC: 2.2 MG/DL — SIGNIFICANT CHANGE UP (ref 1.6–2.6)
MCHC RBC-ENTMCNC: 28.2 GM/DL — LOW (ref 32–36)
MCHC RBC-ENTMCNC: 28.3 PG — SIGNIFICANT CHANGE UP (ref 27–34)
MCV RBC AUTO: 100.3 FL — HIGH (ref 80–100)
MONOCYTES # BLD AUTO: 0.66 K/UL — SIGNIFICANT CHANGE UP (ref 0–0.9)
MONOCYTES NFR BLD AUTO: 5.4 % — SIGNIFICANT CHANGE UP (ref 2–14)
NEUTROPHILS # BLD AUTO: 10.33 K/UL — HIGH (ref 1.8–7.4)
NEUTROPHILS NFR BLD AUTO: 83.8 % — HIGH (ref 43–77)
NRBC # BLD: 0 /100 WBCS — SIGNIFICANT CHANGE UP (ref 0–0)
PLATELET # BLD AUTO: 469 K/UL — HIGH (ref 150–400)
POTASSIUM SERPL-MCNC: 4.9 MMOL/L — SIGNIFICANT CHANGE UP (ref 3.5–5.3)
POTASSIUM SERPL-MCNC: 4.9 MMOL/L — SIGNIFICANT CHANGE UP (ref 3.5–5.3)
POTASSIUM SERPL-SCNC: 4.9 MMOL/L — SIGNIFICANT CHANGE UP (ref 3.5–5.3)
POTASSIUM SERPL-SCNC: 4.9 MMOL/L — SIGNIFICANT CHANGE UP (ref 3.5–5.3)
PROT SERPL-MCNC: 6.6 G/DL — SIGNIFICANT CHANGE UP (ref 6–8.3)
RBC # BLD: 2.97 M/UL — LOW (ref 4.2–5.8)
RBC # FLD: 14.6 % — HIGH (ref 10.3–14.5)
SARS-COV-2 RNA SPEC QL NAA+PROBE: SIGNIFICANT CHANGE UP
SODIUM SERPL-SCNC: 146 MMOL/L — HIGH (ref 135–145)
SODIUM SERPL-SCNC: 146 MMOL/L — HIGH (ref 135–145)
WBC # BLD: 12.32 K/UL — HIGH (ref 3.8–10.5)
WBC # FLD AUTO: 12.32 K/UL — HIGH (ref 3.8–10.5)

## 2020-04-09 PROCEDURE — 99233 SBSQ HOSP IP/OBS HIGH 50: CPT | Mod: GC

## 2020-04-09 PROCEDURE — 99233 SBSQ HOSP IP/OBS HIGH 50: CPT

## 2020-04-09 PROCEDURE — 99497 ADVNCD CARE PLAN 30 MIN: CPT | Mod: 25

## 2020-04-09 PROCEDURE — 71250 CT THORAX DX C-: CPT | Mod: 26

## 2020-04-09 PROCEDURE — 71045 X-RAY EXAM CHEST 1 VIEW: CPT | Mod: 26

## 2020-04-09 PROCEDURE — 74176 CT ABD & PELVIS W/O CONTRAST: CPT | Mod: 26

## 2020-04-09 RX ORDER — SCOPALAMINE 1 MG/3D
1 PATCH, EXTENDED RELEASE TRANSDERMAL
Refills: 0 | Status: DISCONTINUED | OUTPATIENT
Start: 2020-04-09 | End: 2020-04-10

## 2020-04-09 RX ORDER — ALPRAZOLAM 0.25 MG
1 TABLET ORAL
Qty: 7 | Refills: 0
Start: 2020-04-09

## 2020-04-09 RX ORDER — CALCITRIOL 0.5 UG/1
1 CAPSULE ORAL
Qty: 30 | Refills: 0
Start: 2020-04-09 | End: 2020-05-08

## 2020-04-09 RX ORDER — ALBUTEROL 90 UG/1
2 AEROSOL, METERED ORAL EVERY 6 HOURS
Refills: 0 | Status: DISCONTINUED | OUTPATIENT
Start: 2020-04-09 | End: 2020-04-10

## 2020-04-09 RX ORDER — SODIUM BICARBONATE 1 MEQ/ML
650 SYRINGE (ML) INTRAVENOUS
Qty: 0 | Refills: 0 | DISCHARGE

## 2020-04-09 RX ORDER — REPAGLINIDE 1 MG/1
0.5 TABLET ORAL
Qty: 0 | Refills: 0 | DISCHARGE

## 2020-04-09 RX ORDER — CEFUROXIME AXETIL 250 MG
1 TABLET ORAL
Qty: 18 | Refills: 0
Start: 2020-04-09 | End: 2020-04-17

## 2020-04-09 RX ORDER — ALBUTEROL 90 UG/1
2 AEROSOL, METERED ORAL
Qty: 1 | Refills: 0
Start: 2020-04-09 | End: 2020-05-08

## 2020-04-09 RX ORDER — MONTELUKAST 4 MG/1
1 TABLET, CHEWABLE ORAL
Qty: 30 | Refills: 0
Start: 2020-04-09 | End: 2020-05-08

## 2020-04-09 RX ORDER — CALCIUM GLUCONATE 100 MG/ML
2 VIAL (ML) INTRAVENOUS ONCE
Refills: 0 | Status: COMPLETED | OUTPATIENT
Start: 2020-04-09 | End: 2020-04-09

## 2020-04-09 RX ORDER — GLIMEPIRIDE 1 MG
1 TABLET ORAL
Qty: 0 | Refills: 0 | DISCHARGE

## 2020-04-09 RX ORDER — ALPRAZOLAM 0.25 MG
1 TABLET ORAL
Qty: 0 | Refills: 0 | DISCHARGE
Start: 2020-04-09

## 2020-04-09 RX ORDER — SODIUM BICARBONATE 1 MEQ/ML
1300 SYRINGE (ML) INTRAVENOUS
Qty: 0 | Refills: 0 | DISCHARGE

## 2020-04-09 RX ORDER — BUDESONIDE AND FORMOTEROL FUMARATE DIHYDRATE 160; 4.5 UG/1; UG/1
2 AEROSOL RESPIRATORY (INHALATION)
Qty: 1 | Refills: 0
Start: 2020-04-09 | End: 2020-05-08

## 2020-04-09 RX ORDER — SCOPALAMINE 1 MG/3D
1 PATCH, EXTENDED RELEASE TRANSDERMAL
Qty: 10 | Refills: 0
Start: 2020-04-09 | End: 2020-05-08

## 2020-04-09 RX ORDER — BUPROPION HYDROCHLORIDE 150 MG/1
1 TABLET, EXTENDED RELEASE ORAL
Qty: 0 | Refills: 0 | DISCHARGE

## 2020-04-09 RX ORDER — ALPRAZOLAM 0.25 MG
0.25 TABLET ORAL DAILY
Refills: 0 | Status: DISCONTINUED | OUTPATIENT
Start: 2020-04-09 | End: 2020-04-10

## 2020-04-09 RX ORDER — BUPROPION HYDROCHLORIDE 150 MG/1
1.5 TABLET, EXTENDED RELEASE ORAL
Qty: 0 | Refills: 0 | DISCHARGE

## 2020-04-09 RX ORDER — SODIUM BICARBONATE 1 MEQ/ML
650 SYRINGE (ML) INTRAVENOUS
Refills: 0 | Status: DISCONTINUED | OUTPATIENT
Start: 2020-04-09 | End: 2020-04-10

## 2020-04-09 RX ORDER — INSULIN GLARGINE 100 [IU]/ML
10 INJECTION, SOLUTION SUBCUTANEOUS AT BEDTIME
Refills: 0 | Status: DISCONTINUED | OUTPATIENT
Start: 2020-04-09 | End: 2020-04-10

## 2020-04-09 RX ORDER — CALCIUM ACETATE 667 MG
2 TABLET ORAL
Qty: 180 | Refills: 0
Start: 2020-04-09 | End: 2020-05-08

## 2020-04-09 RX ORDER — REPAGLINIDE 1 MG/1
1 TABLET ORAL
Qty: 0 | Refills: 0 | DISCHARGE

## 2020-04-09 RX ADMIN — SERTRALINE 100 MILLIGRAM(S): 25 TABLET, FILM COATED ORAL at 13:11

## 2020-04-09 RX ADMIN — Medication 10 MILLIGRAM(S): at 07:09

## 2020-04-09 RX ADMIN — MONTELUKAST 10 MILLIGRAM(S): 4 TABLET, CHEWABLE ORAL at 13:11

## 2020-04-09 RX ADMIN — Medication 1334 MILLIGRAM(S): at 13:12

## 2020-04-09 RX ADMIN — Medication 3 DROP(S): at 23:02

## 2020-04-09 RX ADMIN — Medication 1334 MILLIGRAM(S): at 09:21

## 2020-04-09 RX ADMIN — BUPROPION HYDROCHLORIDE 150 MILLIGRAM(S): 150 TABLET, EXTENDED RELEASE ORAL at 13:12

## 2020-04-09 RX ADMIN — Medication 200 GRAM(S): at 17:42

## 2020-04-09 RX ADMIN — Medication 3: at 09:21

## 2020-04-09 RX ADMIN — Medication 1334 MILLIGRAM(S): at 17:41

## 2020-04-09 RX ADMIN — HEPARIN SODIUM 5000 UNIT(S): 5000 INJECTION INTRAVENOUS; SUBCUTANEOUS at 13:13

## 2020-04-09 RX ADMIN — Medication 25 MILLIGRAM(S): at 17:41

## 2020-04-09 RX ADMIN — HEPARIN SODIUM 5000 UNIT(S): 5000 INJECTION INTRAVENOUS; SUBCUTANEOUS at 23:01

## 2020-04-09 RX ADMIN — Medication 25 MILLIGRAM(S): at 07:08

## 2020-04-09 RX ADMIN — Medication 650 MILLIGRAM(S): at 17:40

## 2020-04-09 RX ADMIN — PIPERACILLIN AND TAZOBACTAM 25 GRAM(S): 4; .5 INJECTION, POWDER, LYOPHILIZED, FOR SOLUTION INTRAVENOUS at 07:08

## 2020-04-09 RX ADMIN — INSULIN GLARGINE 10 UNIT(S): 100 INJECTION, SOLUTION SUBCUTANEOUS at 23:12

## 2020-04-09 RX ADMIN — BUDESONIDE AND FORMOTEROL FUMARATE DIHYDRATE 2 PUFF(S): 160; 4.5 AEROSOL RESPIRATORY (INHALATION) at 21:29

## 2020-04-09 RX ADMIN — HEPARIN SODIUM 5000 UNIT(S): 5000 INJECTION INTRAVENOUS; SUBCUTANEOUS at 07:07

## 2020-04-09 RX ADMIN — Medication 0.25 MILLIGRAM(S): at 13:12

## 2020-04-09 RX ADMIN — Medication 1: at 13:10

## 2020-04-09 RX ADMIN — Medication 650 MILLIGRAM(S): at 23:02

## 2020-04-09 RX ADMIN — PIPERACILLIN AND TAZOBACTAM 25 GRAM(S): 4; .5 INJECTION, POWDER, LYOPHILIZED, FOR SOLUTION INTRAVENOUS at 17:40

## 2020-04-09 RX ADMIN — Medication 3 DROP(S): at 13:12

## 2020-04-09 RX ADMIN — Medication 3 DROP(S): at 07:00

## 2020-04-09 RX ADMIN — SCOPALAMINE 1 PATCH: 1 PATCH, EXTENDED RELEASE TRANSDERMAL at 17:41

## 2020-04-09 RX ADMIN — ATORVASTATIN CALCIUM 10 MILLIGRAM(S): 80 TABLET, FILM COATED ORAL at 23:01

## 2020-04-09 RX ADMIN — CALCITRIOL 0.5 MICROGRAM(S): 0.5 CAPSULE ORAL at 13:12

## 2020-04-09 RX ADMIN — SCOPALAMINE 1 PATCH: 1 PATCH, EXTENDED RELEASE TRANSDERMAL at 20:00

## 2020-04-09 RX ADMIN — Medication 81 MILLIGRAM(S): at 13:12

## 2020-04-09 RX ADMIN — Medication 1: at 23:11

## 2020-04-09 RX ADMIN — BUDESONIDE AND FORMOTEROL FUMARATE DIHYDRATE 2 PUFF(S): 160; 4.5 AEROSOL RESPIRATORY (INHALATION) at 09:59

## 2020-04-09 NOTE — PROGRESS NOTE ADULT - ASSESSMENT
86M w/ PMH of Dementia, DM, HTN, Depression, CKD4, COPD presents to the ED with difficulty breathing and progressive confusion. Admitted delia cal[facility] COPD exacerbation with repeated testing negative for COVID (last done 4/8)    *SOB: with hypoxia. Likely multifactorial in etiology. Concern for infection v COPD exacerbation. Repeat COVID testing neg 4/8. Hx diastolic HF pEF  -COVID 19-Negative on 4/8  -Continue with Zosyn (started 4/8), will change to po on d/c home  -Continue Albuterol, Symbicort, Nebs   -Continue PO steroids  -Cont Guaifenesin, atropine, montelukast  -Continue with supplemental oxygen and wean as tolerated, currently saturating 80% on RA, improving to 94% on 4LNC  -Check repeat CXR 4/9    *PNA  -on Zosyn  -Repeat COVID-19 neg 4/8  -Check repeat CXR 4/9    *Metabolic encephalopathy: 2/2 metabolic acidosis/multiple electrolytes abnormalities likely 2/2 Denosumab & PABLITO  -Trend BMP  -Aspiration precautions  -Appreciate Renal recs     *PABLITO (Cr 2.8 in May 2019) on CKD stage 4   -Cr worsening   -Encourage PO intake   -Nephrology following   -Avoid nephrotoxic meds    *Hypernatremia: May be due to poor PO intake  -Encourage PO fluids  -Monitor labs    *Hypocalcemia likely 2/2 Denosumab vs secondary hyperparathyroidism   -Prolonged QTc on ECG (566) on admission - improving ( on 3/30)  -Continue Rocaltrol Phoslo   -Appreciate Renal recs   -Trend calcium levels     *Normocytic anemia 2/2 CKD  -Monitor labs    *Leukocytosis:   -Continue with Zosyn  -Monitor labs    *Urinary retention  -Off flomax, voiding    *HTN: BP stable  -Continue Metoprolol     *DM2  -increase Lantus 10 Units HS   -Cont FS/ISS  -on steroids     *HLD:  -Continue atorvastatin    *Depression  -Cont Sertraline, Buproprion (home dose buproprion 100mg BID, pharmacy here only has 150mg XL)  -Continue Xanax 0.25mg qd prn    *DVT ppx- heparin    *Diet: Pureed - thins  -Assistance with feeds    *Goals of care: DNR/DNI

## 2020-04-09 NOTE — GOALS OF CARE CONVERSATION - ADVANCED CARE PLANNING - CONVERSATION DETAILS
DTR bedsdie  discuss goc, agree to DNR/I
Hospice consents signed . DME ordered. Planned DC home 4/10/2020.
*Goals of care: DNR/DNI  Discussed case with daughter, Family would like to pursue home hospice care at this time and to continue for patient to be DNR/DNI.   time spent 16 min

## 2020-04-09 NOTE — PROGRESS NOTE ADULT - SUBJECTIVE AND OBJECTIVE BOX
Follow-up Pulm/pall Progress Note    Rod Avalos MD  [phone]    Less cough    Medications:  Vital Signs Last 24 Hrs  T(C): 37.1 (09 Apr 2020 09:21), Max: 37.4 (08 Apr 2020 20:49)  T(F): 98.7 (09 Apr 2020 09:21), Max: 99.4 (08 Apr 2020 20:49)  HR: 92 (09 Apr 2020 09:21) (92 - 115)  BP: 142/76 (09 Apr 2020 09:21) (140/80 - 144/76)  BP(mean): --  RR: 20 (09 Apr 2020 09:21) (16 - 20)  SpO2: 94% (09 Apr 2020 09:21) (94% - 96%)            LABS:                        8.4    12.32 )-----------( 469      ( 09 Apr 2020 06:58 )             29.8     04-09    146<H>  |  110<H>  |  95<H>  ----------------------------<  242<H>  4.9   |  17<L>  |  3.88<H>    Ca    7.6<L>      09 Apr 2020 06:58  Mg     2.2     04-09    TPro  6.6  /  Alb  2.0<L>  /  TBili  0.4  /  DBili  x   /  AST  14  /  ALT  20  /  AlkPhos  95  04-09              CULTURES:        Physical Examination:      RADIOLOGY REVIEWED  CXR:    CT chest:    TTE:

## 2020-04-09 NOTE — GOALS OF CARE CONVERSATION - ADVANCED CARE PLANNING - CONVERSATION/DISCUSSION
Other (specify)
Hospice Referral
Treatment Options/Prognosis/Palliative Care Referral/Diagnosis/MOLST Discussed

## 2020-04-09 NOTE — PROGRESS NOTE ADULT - ASSESSMENT
86M w/ PMH of Dementia, DM, HTN, Depression, CKD4, COPD presents to the ED with difficulty breathing and progressive confusion. Admitted delia calle COPD exacerbation with repeated testing negative for COVID (last done 4/8)    *SOB: with hypoxia. Likely multifactorial in etiology. Concern for infection v COPD exacerbation. Repeat COVID testing neg 4/8. Hx diastolic HF pEF  -COVID 19-Negative on 4/8  -Continue with Zosyn (started 4/8)  -Continue Albuterol, Symbicort, Nebs   -Continue PO steroids  -Cont Guaifenesin, atropine, montelukast  -Continue with supplemental oxygen and wean as tolerated, currently saturating 80% on RA, improving to 94% on 4LNC    *PNA  -on Zosyn  -Repeat COVID-19 neg 4/8    *Metabolic encephalopathy: 2/2 metabolic acidosis/multiple electrolytes abnormalities likely 2/2 Denosumab & PABLITO  -Trend BMP  -Aspiration precautions  -Appreciate Renal recs     *PABLITO (Cr 2.8 in May 2019) on CKD stage 4   -Cr worsening   -Encourage PO intake   -Nephrology following   -Avoid nephrotoxic meds    *Hypernatremia: May be due to poor PO intake  -Encourage PO fluids  -Monitor labs    *Hypocalcemia likely 2/2 Denosumab vs secondary hyperparathyroidism   -Prolonged QTc on ECG (566) on admission - improving ( on 3/30)  -Continue Rocaltrol Phoslo   -Appreciate Renal recs   -Trend calcium levels     *Normocytic anemia 2/2 CKD  -Monitor labs    *Leukocytosis:   -Continue with Zosyn  -Monitor labs    *Urinary retention  -Off flomax, voiding    *HTN: BP stable  -Continue Metoprolol     *DMII  -Cont Lantus 7 Units HS   -Cont FS/ISS  -on steroids     *HLD:  -Continue atorvastatin    *Depression  -Cont Sertraline, Buproprion (home dose buproprion 100mg BID, pharmacy here only has 150mg XL)  -Continue Xanax 0.25mg qd    *DVT ppx  -UFH    *Diet: Pureed - thins    *Goals of care: DNR/DNI 86M w/ PMH of Dementia, DM, HTN, Depression, CKD4, COPD presents to the ED with difficulty breathing and progressive confusion. Admitted delia [facility]le COPD exacerbation with repeated testing negative for COVID (last done 4/8)    *SOB: with hypoxia. Likely multifactorial in etiology. Concern for infection v COPD exacerbation. Repeat COVID testing neg 4/8. Hx diastolic HF pEF  -COVID 19-Negative on 4/8  -Continue with Zosyn (started 4/8)  -Continue Albuterol, Symbicort, Nebs   -Continue PO steroids  -Cont Guaifenesin, atropine, montelukast  -Continue with supplemental oxygen and wean as tolerated, currently saturating 80% on RA, improving to 94% on 4LNC  -Check repeat CXR 4/9    *PNA  -on Zosyn  -Repeat COVID-19 neg 4/8  -Check repeat CXR 4/9    *Metabolic encephalopathy: 2/2 metabolic acidosis/multiple electrolytes abnormalities likely 2/2 Denosumab & PABLITO  -Trend BMP  -Aspiration precautions  -Appreciate Renal recs     *PABLITO (Cr 2.8 in May 2019) on CKD stage 4   -Cr worsening   -Encourage PO intake   -Nephrology following   -Avoid nephrotoxic meds    *Hypernatremia: May be due to poor PO intake  -Encourage PO fluids  -Monitor labs    *Hypocalcemia likely 2/2 Denosumab vs secondary hyperparathyroidism   -Prolonged QTc on ECG (566) on admission - improving ( on 3/30)  -Continue Rocaltrol Phoslo   -Appreciate Renal recs   -Trend calcium levels     *Normocytic anemia 2/2 CKD  -Monitor labs    *Leukocytosis:   -Continue with Zosyn  -Monitor labs    *Urinary retention  -Off flomax, voiding    *HTN: BP stable  -Continue Metoprolol     *DMII  -Cont Lantus 7 Units HS   -Cont FS/ISS  -on steroids     *HLD:  -Continue atorvastatin    *Depression  -Cont Sertraline, Buproprion (home dose buproprion 100mg BID, pharmacy here only has 150mg XL)  -Continue Xanax 0.25mg qd    *DVT ppx  -UFH    *Diet: Pureed - thins  -Assistance with feeds    *Goals of care: DNR/DNI 86M w/ PMH of Dementia, DM, HTN, Depression, CKD4, COPD presents to the ED with difficulty breathing and progressive confusion. Admitted delia calle COPD exacerbation with repeated testing negative for COVID (last done 4/8)    *SOB: with hypoxia. Likely multifactorial in etiology. Concern for infection v COPD exacerbation. Repeat COVID testing neg 4/8. Hx diastolic HF pEF  -COVID 19-Negative on 4/8  -Continue with Zosyn (started 4/8)  -Continue Albuterol, Symbicort, Nebs   -Continue PO steroids  -Cont Guaifenesin, atropine, montelukast  -Continue with supplemental oxygen and wean as tolerated, currently saturating 80% on RA, improving to 94% on 4LNC  -Check repeat CXR 4/9    *PNA  -on Zosyn  -Repeat COVID-19 neg 4/8  -Check repeat CXR 4/9    *Metabolic encephalopathy: 2/2 metabolic acidosis/multiple electrolytes abnormalities likely 2/2 Denosumab & PABLITO  -Trend BMP  -Aspiration precautions  -Appreciate Renal recs     *PABLITO (Cr 2.8 in May 2019) on CKD stage 4   -Cr worsening   -Encourage PO intake   -Nephrology following   -Avoid nephrotoxic meds    *Hypernatremia: May be due to poor PO intake  -Encourage PO fluids  -Monitor labs    *Hypocalcemia likely 2/2 Denosumab vs secondary hyperparathyroidism   -Prolonged QTc on ECG (566) on admission - improving ( on 3/30)  -Continue Rocaltrol Phoslo   -Appreciate Renal recs   -Trend calcium levels     *Normocytic anemia 2/2 CKD  -Monitor labs    *Leukocytosis:   -Continue with Zosyn  -Monitor labs    *Urinary retention  -Off flomax, voiding    *HTN: BP stable  -Continue Metoprolol     *DMII  -Cont Lantus 7 Units HS   -Cont FS/ISS  -on steroids     *HLD:  -Continue atorvastatin    *Depression  -Cont Sertraline, Buproprion (home dose buproprion 100mg BID, pharmacy here only has 150mg XL)  -Continue Xanax 0.25mg qd    *DVT ppx  -UFH    *Diet: Pureed - thins  -Assistance with feeds    *Goals of care: DNR/DNI  Discussed case with daughter, palliative care and nephrology about workup and prognosis. Family would like to pursue home hospice care at this time and to continue for patient to be DNR/DNI. Discussed with social work and working on arrangements.

## 2020-04-09 NOTE — PROGRESS NOTE ADULT - SUBJECTIVE AND OBJECTIVE BOX
86M w/ PMH of Dementia, DM, HTN, Depression, CKD4, COPD presents to the ED with difficulty breathing and progressive confusion. Admitted delia calle COPD exacerbation.     Patient seen at bedside.      Vital Signs Last 24 Hrs  T(C): 37.1 (09 Apr 2020 09:21), Max: 37.4 (08 Apr 2020 20:49)  T(F): 98.7 (09 Apr 2020 09:21), Max: 99.4 (08 Apr 2020 20:49)  HR: 92 (09 Apr 2020 09:21) (92 - 118)  BP: 142/76 (09 Apr 2020 09:21) (140/80 - 144/76)  BP(mean): --  RR: 20 (09 Apr 2020 09:21) (16 - 20)  SpO2: 94% (09 Apr 2020 09:21) (94% - 96%)    GENERAL- NAD  EAR/NOSE/MOUTH/THROAT - no pharyngeal exudates, no oral leisions,  MMM  EYES- ALLEGRA, conjunctiva and Sclera clear  NECK- supple  RESPIRATORY-  clear to auscultation bilaterally  CARDIOVASCULAR - SIS2, RRR  GI - soft NT BS present  EXTREMITIES- no pedal edema  NEUROLOGY- no gross focal deficits  SKIN- no rashes, warm to touch  PSYCHIATRY- AAO X 3  MUSCULOSKELETAL- ROM normal                  8.4                  146  | 17   | 95           12.32 >-----------< 469     ------------------------< 242                   29.8                 4.9  | 110  | 3.88                                         Ca 7.6   Mg 2.2   Ph x                .Blood Blood-Peripheral  03-23 @ 16:25   No growth at 5 days.  --  --                    Blood Glucose (POC in last 24hrs)    COVID test:    Labs reviewed:     CXR personally reviewed: Clear lung fields bilaterally    ECG reviewed and interpreted:     CT scan    MEDICATIONS  (STANDING):  ALPRAZolam 0.25 milliGRAM(s) Oral daily  aspirin enteric coated 81 milliGRAM(s) Oral daily  atorvastatin 10 milliGRAM(s) Oral at bedtime  atropine 1% Ophthalmic Solution for SubLingual Use 3 Drop(s) SubLingual every 8 hours  budesonide 160 MICROgram(s)/formoterol 4.5 MICROgram(s) Inhaler 2 Puff(s) Inhalation two times a day  buPROPion XL . 150 milliGRAM(s) Oral daily  calcitriol   Capsule 0.5 MICROGram(s) Oral daily  calcium acetate 1334 milliGRAM(s) Oral three times a day with meals  dextrose 5%. 1000 milliLiter(s) (50 mL/Hr) IV Continuous <Continuous>  dextrose 50% Injectable 12.5 Gram(s) IV Push once  dextrose 50% Injectable 25 Gram(s) IV Push once  dextrose 50% Injectable 25 Gram(s) IV Push once  heparin  Injectable 5000 Unit(s) SubCutaneous every 8 hours  insulin glargine Injectable (LANTUS) 7 Unit(s) SubCutaneous at bedtime  insulin lispro (HumaLOG) corrective regimen sliding scale   SubCutaneous three times a day before meals  insulin lispro (HumaLOG) corrective regimen sliding scale   SubCutaneous at bedtime  metoprolol tartrate 25 milliGRAM(s) Oral two times a day  montelukast 10 milliGRAM(s) Oral daily  piperacillin/tazobactam IVPB.. 3.375 Gram(s) IV Intermittent every 12 hours  predniSONE   Tablet 10 milliGRAM(s) Oral daily  sertraline 100 milliGRAM(s) Oral daily    MEDICATIONS  (PRN):  acetaminophen   Tablet .. 650 milliGRAM(s) Oral every 4 hours PRN Mild Pain (1 - 3)  ALBUTerol    90 MICROgram(s) HFA Inhaler 2 Puff(s) Inhalation every 6 hours PRN Shortness of Breath and/or Wheezing  dextrose 40% Gel 15 Gram(s) Oral once PRN Blood Glucose LESS THAN 70 milliGRAM(s)/deciliter  glucagon  Injectable 1 milliGRAM(s) IntraMuscular once PRN Glucose LESS THAN 70 milligrams/deciliter  guaiFENesin   Syrup  (Sugar-Free) 200 milliGRAM(s) Oral every 6 hours PRN Cough 86M w/ PMH of Dementia, DM, HTN, Depression, CKD4, COPD presents to the ED with difficulty breathing and progressive confusion. Admitted delia calle COPD exacerbation.     Patient seen at bedside. Patient having difficulty with command following, but occasionally answering questions appropriately. Patient with persistent cough and difficulty breathing. Reports that he is hungry.  Denies abdominal pain, nausea or vomiting. Currently saturating 80% on RA and 94% on 4LNC.     Vital Signs Last 24 Hrs  T(C): 37.1 (09 Apr 2020 09:21), Max: 37.4 (08 Apr 2020 20:49)  T(F): 98.7 (09 Apr 2020 09:21), Max: 99.4 (08 Apr 2020 20:49)  HR: 92 (09 Apr 2020 09:21) (92 - 118)  BP: 142/76 (09 Apr 2020 09:21) (140/80 - 144/76)  BP(mean): --  RR: 20 (09 Apr 2020 09:21) (16 - 20)  SpO2: 94% (09 Apr 2020 09:21) (94% - 96%)    GENERAL- NAD  EAR/NOSE/MOUTH/THROAT - no pharyngeal exudates, no oral lesions,  dry mucus membranes  EYES- ALLEGRA, conjunctiva and Sclera clear  NECK- supple, No limited ROM  RESPIRATORY-  cough during exam bringing up thick sputum, gurgling, rhonchi/rales, saturating 80% on RA, improving to 94% on 4LNC  CARDIOVASCULAR - SIS2, mild tachy  GI - soft NT BS present  EXTREMITIES- no pedal edema, left leg eschar  NEUROLOGY- no gross focal deficits, awake and oriented to self, place and year. Intermittent command following  SKIN- no rashes, warm to touch  PSYCHIATRY- AAO X to person, place as "hospital" and year  MUSCULOSKELETAL- ROM normal                  8.4                  146  | 17   | 95           12.32 >-----------< 469     ------------------------< 242                   29.8                 4.9  | 110  | 3.88                                         Ca 7.6   Mg 2.2   Ph x      Comprehensive Metabolic Panel in AM (04.09.20 @ 06:58)    Protein Total, Serum: 6.6 g/dL    Albumin, Serum: 2.0 g/dL    Bilirubin Total, Serum: 0.4: Results verified. mg/dL    Alkaline Phosphatase, Serum: 95 U/L    Aspartate Aminotransferase (AST/SGOT): 14 U/L    Alanine Aminotransferase (ALT/SGPT): 20 U/L    Blood Glucose (POC in last 24hrs)  POCT Blood Glucose.: 259 mg/dL (04.09.20 @ 08:34)  POCT Blood Glucose.: 186 mg/dL (04.08.20 @ 22:43)    COVID test: Negative 4/8    Labs reviewed personally     CXR personally reviewed:   < from: Xray Chest 1 View- PORTABLE-Urgent (04.07.20 @ 21:00) >  IMPRESSION:  Basilar atelectasis with small infiltrates. Possible bone metastases. Clinical correlation is suggested.    < end of copied text >    ECG reviewed and interpreted:   < from: 12 Lead ECG (03.30.20 @ 11:21) >  Ventricular Rate 87 BPM    Atrial Rate 87 BPM    P-R Interval 142 ms    QRS Duration 78 ms    Q-T Interval 414 ms    QTC Calculation(Bezet) 498 ms    P Axis -3 degrees    R Axis -28 degrees    < from: TTE Echo Complete w/o contrast w/ Doppler (03.24.20 @ 13:13) >  T Axis 38 degrees    < end of copied text >    Echo:  < from: TTE Echo Complete w/o contrast w/ Doppler (03.24.20 @ 13:13) >  Summary:   1. Left ventricular ejection fraction, by visual estimation, is 55 to 60%.   2. Technically fair study.   3. Normal global left ventricular systolic function.   4. Spectral Doppler shows impaired relaxation pattern of left ventricular myocardial filling (Grade I diastolic dysfunction).   5. Mild thickening and calcification of the anterior and posterior mitral valve leaflets.   6.Sclerotic aortic valve with normal opening.   7. Estimated pulmonary artery systolic pressure is 37.5 mmHg assuming a right atrial pressure of 10 mmHg, which is consistent with borderline pulmonary hypertension.   8. LA volume Index is 33.6 ml/m² ml/m2.    < end of copied text >      MEDICATIONS  (STANDING):  ALPRAZolam 0.25 milliGRAM(s) Oral daily  aspirin enteric coated 81 milliGRAM(s) Oral daily  atorvastatin 10 milliGRAM(s) Oral at bedtime  atropine 1% Ophthalmic Solution for SubLingual Use 3 Drop(s) SubLingual every 8 hours  budesonide 160 MICROgram(s)/formoterol 4.5 MICROgram(s) Inhaler 2 Puff(s) Inhalation two times a day  buPROPion XL . 150 milliGRAM(s) Oral daily  calcitriol   Capsule 0.5 MICROGram(s) Oral daily  calcium acetate 1334 milliGRAM(s) Oral three times a day with meals  dextrose 5%. 1000 milliLiter(s) (50 mL/Hr) IV Continuous <Continuous>  dextrose 50% Injectable 12.5 Gram(s) IV Push once  dextrose 50% Injectable 25 Gram(s) IV Push once  dextrose 50% Injectable 25 Gram(s) IV Push once  heparin  Injectable 5000 Unit(s) SubCutaneous every 8 hours  insulin glargine Injectable (LANTUS) 7 Unit(s) SubCutaneous at bedtime  insulin lispro (HumaLOG) corrective regimen sliding scale   SubCutaneous three times a day before meals  insulin lispro (HumaLOG) corrective regimen sliding scale   SubCutaneous at bedtime  metoprolol tartrate 25 milliGRAM(s) Oral two times a day  montelukast 10 milliGRAM(s) Oral daily  piperacillin/tazobactam IVPB.. 3.375 Gram(s) IV Intermittent every 12 hours  predniSONE   Tablet 10 milliGRAM(s) Oral daily  sertraline 100 milliGRAM(s) Oral daily    MEDICATIONS  (PRN):  acetaminophen   Tablet .. 650 milliGRAM(s) Oral every 4 hours PRN Mild Pain (1 - 3)  ALBUTerol    90 MICROgram(s) HFA Inhaler 2 Puff(s) Inhalation every 6 hours PRN Shortness of Breath and/or Wheezing  dextrose 40% Gel 15 Gram(s) Oral once PRN Blood Glucose LESS THAN 70 milliGRAM(s)/deciliter  glucagon  Injectable 1 milliGRAM(s) IntraMuscular once PRN Glucose LESS THAN 70 milligrams/deciliter  guaiFENesin   Syrup  (Sugar-Free) 200 milliGRAM(s) Oral every 6 hours PRN Cough

## 2020-04-09 NOTE — PROGRESS NOTE ADULT - SUBJECTIVE AND OBJECTIVE BOX
86M w/ PMH of Dementia, DM, HTN, Depression, CKD4, COPD presents to the ED with difficulty breathing and progressive confusion. Admitted delia calle COPD exacerbation.     Patient seen at bedside. Patient having difficulty with command following, but occasionally answering questions appropriately. Patient with persistent cough and some difficulty breathing. Reports that he is hungry.  Denies abdominal pain, nausea or vomiting. Currently saturating 80% on RA and 94% on 4LNC.     Vital Signs Last 24 Hrs  T(C): 37.1 (09 Apr 2020 09:21), Max: 37.4 (08 Apr 2020 20:49)  T(F): 98.7 (09 Apr 2020 09:21), Max: 99.4 (08 Apr 2020 20:49)  HR: 92 (09 Apr 2020 09:21) (92 - 118)  BP: 142/76 (09 Apr 2020 09:21) (140/80 - 144/76)  BP(mean): --  RR: 20 (09 Apr 2020 09:21) (16 - 20)  SpO2: 94% (09 Apr 2020 09:21) (94% - 96%)    GENERAL- NAD  EAR/NOSE/MOUTH/THROAT - no pharyngeal exudates, no oral lesions,  dry mucus membranes  EYES- ALLEGRA, conjunctiva and Sclera clear  NECK- supple, No limited ROM  RESPIRATORY-  cough during exam bringing up thick sputum, gurgling, rhonchi/rales, saturating 80% on RA, improving to 94% on 4LNC  CARDIOVASCULAR - SIS2, mild tachy  GI - soft NT BS present  EXTREMITIES- no pedal edema, left leg eschar  NEUROLOGY- no gross focal deficits, awake and oriented to self, place and year. Intermittent command following  PSYCHIATRY- AAO X to person, place as "hospital" and year  MUSCULOSKELETAL- ROM normal                  8.4                  146  | 17   | 95           12.32 >-----------< 469     ------------------------< 242                   29.8                 4.9  | 110  | 3.88                                         Ca 7.6   Mg 2.2   Ph x          Comprehensive Metabolic Panel in AM (04.09.20 @ 06:58)    Protein Total, Serum: 6.6 g/dL    Albumin, Serum: 2.0 g/dL    Bilirubin Total, Serum: 0.4: Results verified. mg/dL    Alkaline Phosphatase, Serum: 95 U/L    Aspartate Aminotransferase (AST/SGOT): 14 U/L    Alanine Aminotransferase (ALT/SGPT): 20 U/L    CAPILLARY BLOOD GLUCOSE      POCT Blood Glucose.: 184 mg/dL (09 Apr 2020 12:19)  POCT Blood Glucose.: 259 mg/dL (09 Apr 2020 08:34)  POCT Blood Glucose.: 186 mg/dL (08 Apr 2020 22:43)  POCT Blood Glucose.: 255 mg/dL (08 Apr 2020 18:20)    Blood Glucose (POC in last 24hrs)  POCT Blood Glucose.: 259 mg/dL (04.09.20 @ 08:34)  POCT Blood Glucose.: 186 mg/dL (04.08.20 @ 22:43)    COVID test: Negative 4/8    Labs reviewed personally     CXR personally reviewed:   < from: Xray Chest 1 View- PORTABLE-Urgent (04.07.20 @ 21:00) >  IMPRESSION:  Basilar atelectasis with small infiltrates. Possible bone metastases. Clinical correlation is suggested.    < end of copied text >    ECG reviewed and interpreted:   < from: 12 Lead ECG (03.30.20 @ 11:21) >  Ventricular Rate 87 BPM    Atrial Rate 87 BPM    P-R Interval 142 ms    QRS Duration 78 ms    Q-T Interval 414 ms    QTC Calculation(Bezet) 498 ms    P Axis -3 degrees    R Axis -28 degrees    < from: TTE Echo Complete w/o contrast w/ Doppler (03.24.20 @ 13:13) >  T Axis 38 degrees    < end of copied text >    Echo:  < from: TTE Echo Complete w/o contrast w/ Doppler (03.24.20 @ 13:13) >  Summary:   1. Left ventricular ejection fraction, by visual estimation, is 55 to 60%.   2. Technically fair study.   3. Normal global left ventricular systolic function.   4. Spectral Doppler shows impaired relaxation pattern of left ventricular myocardial filling (Grade I diastolic dysfunction).   5. Mild thickening and calcification of the anterior and posterior mitral valve leaflets.   6.Sclerotic aortic valve with normal opening.   7. Estimated pulmonary artery systolic pressure is 37.5 mmHg assuming a right atrial pressure of 10 mmHg, which is consistent with borderline pulmonary hypertension.   8. LA volume Index is 33.6 ml/m² ml/m2.    < end of copied text >      MEDICATIONS  (STANDING):  ALPRAZolam 0.25 milliGRAM(s) Oral daily  aspirin enteric coated 81 milliGRAM(s) Oral daily  atorvastatin 10 milliGRAM(s) Oral at bedtime  atropine 1% Ophthalmic Solution for SubLingual Use 3 Drop(s) SubLingual every 8 hours  budesonide 160 MICROgram(s)/formoterol 4.5 MICROgram(s) Inhaler 2 Puff(s) Inhalation two times a day  buPROPion XL . 150 milliGRAM(s) Oral daily  calcitriol   Capsule 0.5 MICROGram(s) Oral daily  calcium acetate 1334 milliGRAM(s) Oral three times a day with meals  heparin  Injectable 5000 Unit(s) SubCutaneous every 8 hours  insulin glargine Injectable (LANTUS) 7 Unit(s) SubCutaneous at bedtime  insulin lispro (HumaLOG) corrective regimen sliding scale   SubCutaneous three times a day before meals  insulin lispro (HumaLOG) corrective regimen sliding scale   SubCutaneous at bedtime  metoprolol tartrate 25 milliGRAM(s) Oral two times a day  montelukast 10 milliGRAM(s) Oral daily  piperacillin/tazobactam IVPB.. 3.375 Gram(s) IV Intermittent every 12 hours  predniSONE   Tablet 10 milliGRAM(s) Oral daily  sertraline 100 milliGRAM(s) Oral daily    MEDICATIONS  (PRN):  acetaminophen   Tablet .. 650 milliGRAM(s) Oral every 4 hours PRN Mild Pain (1 - 3)  ALBUTerol    90 MICROgram(s) HFA Inhaler 2 Puff(s) Inhalation every 6 hours PRN Shortness of Breath and/or Wheezing  dextrose 40% Gel 15 Gram(s) Oral once PRN Blood Glucose LESS THAN 70 milliGRAM(s)/deciliter  glucagon  Injectable 1 milliGRAM(s) IntraMuscular once PRN Glucose LESS THAN 70 milligrams/deciliter  guaiFENesin   Syrup  (Sugar-Free) 200 milliGRAM(s) Oral every 6 hours PRN Cough

## 2020-04-09 NOTE — PROGRESS NOTE ADULT - ASSESSMENT
86M w/ PMH of Dementia, DM, HTN, Depression, CKD4, COPD presents to the ED with difficulty breathing and progressive confusion. Admitted delia calle COPD exacerbation with repeated testing negative for COVID (last done 4/8)    *SOB: with hypoxia. Likely multifactorial in etiology. Concern for infection v COPD exacerbation. Repeat COVID testing neg 4/8. Hx diastolic HF pEF  -COVID 19-Negative on 4/8  -Continue with Zosyn (started 4/8), will change to po on d/c home  -Continue Albuterol, Symbicort, Nebs   -Continue PO steroids  -Cont Guaifenesin, atropine, montelukast  -Continue with supplemental oxygen and wean as tolerated, currently saturating 80% on RA, improving to 94% on 4LNC  -Check repeat CXR 4/9    *PNA  -on Zosyn  -Repeat COVID-19 neg 4/8  -Check repeat CXR 4/9    *Metabolic encephalopathy: 2/2 metabolic acidosis/multiple electrolytes abnormalities likely 2/2 Denosumab & PABLITO  -Trend BMP  -Aspiration precautions  -Appreciate Renal recs     *PABLITO (Cr 2.8 in May 2019) on CKD stage 4   -Cr worsening   -Encourage PO intake   -Nephrology following   -Avoid nephrotoxic meds    *Hypernatremia: May be due to poor PO intake  -Encourage PO fluids  -Monitor labs    *Hypocalcemia likely 2/2 Denosumab vs secondary hyperparathyroidism   -Prolonged QTc on ECG (566) on admission - improving ( on 3/30)  -Continue Rocaltrol Phoslo   -Appreciate Renal recs   -Trend calcium levels     *Normocytic anemia 2/2 CKD  -Monitor labs    *Leukocytosis:   -Continue with Zosyn  -Monitor labs    *Urinary retention  -Off flomax, voiding    *HTN: BP stable  -Continue Metoprolol     *DM2  -increase Lantus 10 Units HS   -Cont FS/ISS  -on steroids     *HLD:  -Continue atorvastatin    *Depression  -Cont Sertraline, Buproprion (home dose buproprion 100mg BID, pharmacy here only has 150mg XL)  -Continue Xanax 0.25mg qd prn    *DVT ppx- heparin    *Diet: Pureed - thins  -Assistance with feeds    *Goals of care: DNR/DNI  Discussed case with daughter, palliative care and nephrology about workup and prognosis. Family would like to pursue home hospice care at this time and to continue for patient to be DNR/DNI. Discussed with social work and working on arrangements.     will follow 86M w/ PMH of Dementia, DM, HTN, Depression, CKD4, COPD presents to the ED with difficulty breathing and progressive confusion. Admitted delia calle COPD exacerbation with repeated testing negative for COVID (last done 4/8)    *SOB: with hypoxia. Likely multifactorial in etiology. Concern for infection v COPD exacerbation. Repeat COVID testing neg 4/8. Hx diastolic HF pEF  -COVID 19-Negative on 4/8  -Continue with Zosyn (started 4/8), will change to po on d/c home  -Continue Albuterol, Symbicort, Nebs   -Continue PO steroids  -Cont Guaifenesin, atropine, montelukast  -Continue with supplemental oxygen and wean as tolerated, currently saturating 80% on RA, improving to 94% on 4LNC  -Check repeat CXR 4/9    *PNA  -on Zosyn  -Repeat COVID-19 neg 4/8  -Check repeat CXR 4/9    *Metabolic encephalopathy: 2/2 metabolic acidosis/multiple electrolytes abnormalities likely 2/2 Denosumab & PABLITO  -Trend BMP  -Aspiration precautions  -Appreciate Renal recs     *PABLITO (Cr 2.8 in May 2019) on CKD stage 4   -Cr worsening   -Encourage PO intake   -Nephrology following   -Avoid nephrotoxic meds    *Hypernatremia: May be due to poor PO intake  -Encourage PO fluids  -Monitor labs    *Hypocalcemia likely 2/2 Denosumab vs secondary hyperparathyroidism   -Prolonged QTc on ECG (566) on admission - improving ( on 3/30)  -Continue Rocaltrol Phoslo   -Appreciate Renal recs   -Trend calcium levels     *Normocytic anemia 2/2 CKD  -Monitor labs    *Leukocytosis:   -Continue with Zosyn  -Monitor labs    *Urinary retention  -Off flomax, voiding    *HTN: BP stable  -Continue Metoprolol     *DM2  -increase Lantus 10 Units HS   -Cont FS/ISS  -on steroids     *HLD:  -Continue atorvastatin    *Depression  -Cont Sertraline, Buproprion (home dose buproprion 100mg BID, pharmacy here only has 150mg XL)  -Continue Xanax 0.25mg qd prn    *DVT ppx- heparin    *Diet: Pureed - thins  -Assistance with feeds    *Goals of care: DNR/DNI  Discussed case with daughter, palliative care and nephrology about workup and prognosis. Family would like to pursue home hospice care at this time and to continue for patient to be DNR/DNI. Discussed with social work and working on arrangements.   time spent 16 min    will follow

## 2020-04-09 NOTE — PROGRESS NOTE ADULT - SUBJECTIVE AND OBJECTIVE BOX
Mildly confused, O2 dependent    Vital Signs Last 24 Hrs  T(C): 37.1 (04-09-20 @ 09:21), Max: 37.4 (04-08-20 @ 20:49)  T(F): 98.7 (04-09-20 @ 09:21), Max: 99.4 (04-08-20 @ 20:49)  HR: 92 (04-09-20 @ 09:21) (92 - 118)  BP: 142/76 (04-09-20 @ 09:21) (140/80 - 144/76)  RR: 20 (04-09-20 @ 09:21) (16 - 20)  SpO2: 94% (04-09-20 @ 09:21) (94% - 96%)    Card S1S2  Lungs decr BS b/l  Abd soft, NT, ND  Extr tr edema                                                                                                   8.4    12.32 )-----------( 469      ( 09 Apr 2020 06:58 )             29.8     09 Apr 2020 06:58    146    |  110    |  95     ----------------------------<  242    4.9     |  17     |  3.88     Ca    7.6        09 Apr 2020 06:58  Mg     2.2       09 Apr 2020 06:58    TPro  6.6    /  Alb  2.0    /  TBili  0.4    /  DBili  x      /  AST  14     /  ALT  20     /  AlkPhos  95     09 Apr 2020 06:58    LIVER FUNCTIONS - ( 09 Apr 2020 06:58 )  Alb: 2.0 g/dL / Pro: 6.6 g/dL / ALK PHOS: 95 U/L / ALT: 20 U/L / AST: 14 U/L / GGT: x           acetaminophen   Tablet .. 650 milliGRAM(s) Oral every 4 hours PRN  ALBUTerol    90 MICROgram(s) HFA Inhaler 2 Puff(s) Inhalation every 6 hours PRN  ALPRAZolam 0.25 milliGRAM(s) Oral daily  aspirin enteric coated 81 milliGRAM(s) Oral daily  atorvastatin 10 milliGRAM(s) Oral at bedtime  atropine 1% Ophthalmic Solution for SubLingual Use 3 Drop(s) SubLingual every 8 hours  budesonide 160 MICROgram(s)/formoterol 4.5 MICROgram(s) Inhaler 2 Puff(s) Inhalation two times a day  buPROPion XL . 150 milliGRAM(s) Oral daily  calcitriol   Capsule 0.5 MICROGram(s) Oral daily  calcium acetate 1334 milliGRAM(s) Oral three times a day with meals  dextrose 40% Gel 15 Gram(s) Oral once PRN  dextrose 5%. 1000 milliLiter(s) IV Continuous <Continuous>  dextrose 50% Injectable 12.5 Gram(s) IV Push once  dextrose 50% Injectable 25 Gram(s) IV Push once  dextrose 50% Injectable 25 Gram(s) IV Push once  glucagon  Injectable 1 milliGRAM(s) IntraMuscular once PRN  guaiFENesin   Syrup  (Sugar-Free) 200 milliGRAM(s) Oral every 6 hours PRN  heparin  Injectable 5000 Unit(s) SubCutaneous every 8 hours  insulin glargine Injectable (LANTUS) 7 Unit(s) SubCutaneous at bedtime  insulin lispro (HumaLOG) corrective regimen sliding scale   SubCutaneous three times a day before meals  insulin lispro (HumaLOG) corrective regimen sliding scale   SubCutaneous at bedtime  metoprolol tartrate 25 milliGRAM(s) Oral two times a day  montelukast 10 milliGRAM(s) Oral daily  piperacillin/tazobactam IVPB.. 3.375 Gram(s) IV Intermittent every 12 hours  predniSONE   Tablet 10 milliGRAM(s) Oral daily  sertraline 100 milliGRAM(s) Oral daily  sodium bicarbonate 650 milliGRAM(s) Oral four times a day    A/P:    Hx COPD, CKD 4 (Cr 2.8 - 5/18/19), metastatic prostate ca  COPD exacerbation, possibly lung mets (COVID negative 3/23 and 4/8)  Severe hypocalcemia w/QT prolongation on adm due to Denosumab (got on 3/12) exacerbated by bone disorder of advanced CKD w/low PTH and hyperphosphatemia, intermittent hypomagnesemia  Ca has improved  Continue Phoslo 2 tabs PO TID and Calcitriol PO  Low K, low Na diet  Now w/PABLITO  Na Bicarb added  Encourage PO  Avoid Denosumab in the future given CKD 4, risk of severe recurrent hypocalcemia  F/u lytes, renal fx  Overall prognosis is very poor  Palliative following  DNR/I    [phone]

## 2020-04-10 ENCOUNTER — TRANSCRIPTION ENCOUNTER (OUTPATIENT)
Age: 85
End: 2020-04-10

## 2020-04-10 VITALS
HEART RATE: 100 BPM | SYSTOLIC BLOOD PRESSURE: 176 MMHG | TEMPERATURE: 99 F | DIASTOLIC BLOOD PRESSURE: 87 MMHG | OXYGEN SATURATION: 93 % | RESPIRATION RATE: 18 BRPM

## 2020-04-10 LAB
ANION GAP SERPL CALC-SCNC: 15 MMOL/L — SIGNIFICANT CHANGE UP (ref 5–17)
BUN SERPL-MCNC: 95 MG/DL — HIGH (ref 7–23)
CALCIUM SERPL-MCNC: 7.2 MG/DL — LOW (ref 8.4–10.5)
CHLORIDE SERPL-SCNC: 114 MMOL/L — HIGH (ref 96–108)
CO2 SERPL-SCNC: 19 MMOL/L — LOW (ref 22–31)
CREAT SERPL-MCNC: 3.67 MG/DL — HIGH (ref 0.5–1.3)
GLUCOSE BLDC GLUCOMTR-MCNC: 149 MG/DL — HIGH (ref 70–99)
GLUCOSE BLDC GLUCOMTR-MCNC: 169 MG/DL — HIGH (ref 70–99)
GLUCOSE BLDC GLUCOMTR-MCNC: 257 MG/DL — HIGH (ref 70–99)
GLUCOSE SERPL-MCNC: 132 MG/DL — HIGH (ref 70–99)
HCT VFR BLD CALC: 23.9 % — LOW (ref 39–50)
HGB BLD-MCNC: 7.2 G/DL — LOW (ref 13–17)
MCHC RBC-ENTMCNC: 28.7 PG — SIGNIFICANT CHANGE UP (ref 27–34)
MCHC RBC-ENTMCNC: 30.1 GM/DL — LOW (ref 32–36)
MCV RBC AUTO: 95.2 FL — SIGNIFICANT CHANGE UP (ref 80–100)
NRBC # BLD: 0 /100 WBCS — SIGNIFICANT CHANGE UP (ref 0–0)
PLATELET # BLD AUTO: 471 K/UL — HIGH (ref 150–400)
POTASSIUM SERPL-MCNC: 4.3 MMOL/L — SIGNIFICANT CHANGE UP (ref 3.5–5.3)
POTASSIUM SERPL-SCNC: 4.3 MMOL/L — SIGNIFICANT CHANGE UP (ref 3.5–5.3)
RBC # BLD: 2.51 M/UL — LOW (ref 4.2–5.8)
RBC # FLD: 14.6 % — HIGH (ref 10.3–14.5)
SODIUM SERPL-SCNC: 148 MMOL/L — HIGH (ref 135–145)
WBC # BLD: 14.98 K/UL — HIGH (ref 3.8–10.5)
WBC # FLD AUTO: 14.98 K/UL — HIGH (ref 3.8–10.5)

## 2020-04-10 PROCEDURE — 99233 SBSQ HOSP IP/OBS HIGH 50: CPT

## 2020-04-10 PROCEDURE — 99232 SBSQ HOSP IP/OBS MODERATE 35: CPT | Mod: GC

## 2020-04-10 RX ORDER — ATORVASTATIN CALCIUM 80 MG/1
1 TABLET, FILM COATED ORAL
Qty: 0 | Refills: 0 | DISCHARGE

## 2020-04-10 RX ORDER — SODIUM CHLORIDE 9 MG/ML
1000 INJECTION, SOLUTION INTRAVENOUS
Refills: 0 | Status: DISCONTINUED | OUTPATIENT
Start: 2020-04-10 | End: 2020-04-10

## 2020-04-10 RX ORDER — ASPIRIN/CALCIUM CARB/MAGNESIUM 324 MG
1 TABLET ORAL
Qty: 0 | Refills: 0 | DISCHARGE

## 2020-04-10 RX ORDER — AMLODIPINE BESYLATE 2.5 MG/1
1 TABLET ORAL
Qty: 0 | Refills: 0 | DISCHARGE

## 2020-04-10 RX ORDER — CALCIUM GLUCONATE 100 MG/ML
2 VIAL (ML) INTRAVENOUS EVERY 4 HOURS
Refills: 0 | Status: DISCONTINUED | OUTPATIENT
Start: 2020-04-10 | End: 2020-04-10

## 2020-04-10 RX ADMIN — HEPARIN SODIUM 5000 UNIT(S): 5000 INJECTION INTRAVENOUS; SUBCUTANEOUS at 05:41

## 2020-04-10 RX ADMIN — BUDESONIDE AND FORMOTEROL FUMARATE DIHYDRATE 2 PUFF(S): 160; 4.5 AEROSOL RESPIRATORY (INHALATION) at 08:28

## 2020-04-10 RX ADMIN — HEPARIN SODIUM 5000 UNIT(S): 5000 INJECTION INTRAVENOUS; SUBCUTANEOUS at 13:37

## 2020-04-10 RX ADMIN — Medication 200 GRAM(S): at 13:36

## 2020-04-10 RX ADMIN — Medication 3 DROP(S): at 13:36

## 2020-04-10 RX ADMIN — SCOPALAMINE 1 PATCH: 1 PATCH, EXTENDED RELEASE TRANSDERMAL at 11:05

## 2020-04-10 RX ADMIN — Medication 650 MILLIGRAM(S): at 05:43

## 2020-04-10 RX ADMIN — SCOPALAMINE 1 PATCH: 1 PATCH, EXTENDED RELEASE TRANSDERMAL at 07:27

## 2020-04-10 RX ADMIN — PIPERACILLIN AND TAZOBACTAM 25 GRAM(S): 4; .5 INJECTION, POWDER, LYOPHILIZED, FOR SOLUTION INTRAVENOUS at 05:45

## 2020-04-10 RX ADMIN — Medication 10 MILLIGRAM(S): at 05:44

## 2020-04-10 RX ADMIN — Medication 1: at 12:44

## 2020-04-10 RX ADMIN — Medication 3 DROP(S): at 05:40

## 2020-04-10 RX ADMIN — Medication 25 MILLIGRAM(S): at 05:44

## 2020-04-10 NOTE — DISCHARGE NOTE NURSING/CASE MANAGEMENT/SOCIAL WORK - PATIENT PORTAL LINK FT
You can access the FollowMyHealth Patient Portal offered by [facility] [place] by registering at the following website: [email or web address]. By joining [employer]’s FollowMyHealth portal, you will also be able to view your health information using other applications (apps) compatible with our system.

## 2020-04-10 NOTE — PROGRESS NOTE ADULT - REASON FOR ADMISSION
Confusion, dyspnea

## 2020-04-10 NOTE — PROGRESS NOTE ADULT - SUBJECTIVE AND OBJECTIVE BOX
Mildly confused, O2 dependent, po intake very poor    Vital Signs Last 24 Hrs  T(C): 36.7 (04-10-20 @ 05:38), Max: 36.8 (04-09-20 @ 23:00)  T(F): 98 (04-10-20 @ 05:38), Max: 98.2 (04-09-20 @ 23:00)  HR: 99 (04-10-20 @ 08:28) (76 - 99)  BP: 168/78 (04-10-20 @ 05:38) (167/64 - 168/78)  RR: 18 (04-10-20 @ 05:38) (18 - 18)  SpO2: 95% (04-10-20 @ 08:28) (94% - 97%)    Card S1S2  Lungs decr BS b/l  Abd soft, NT  Extr tr edema                                                                                                            7.2    14.98 )-----------( 471      ( 10 Apr 2020 06:39 )             23.9     10 Apr 2020 06:39    148    |  114    |  95     ----------------------------<  132    4.3     |  19     |  3.67     Ca    7.2        10 Apr 2020 06:39  Mg     2.2       09 Apr 2020 06:58    TPro  6.6    /  Alb  2.0    /  TBili  0.4    /  DBili  x      /  AST  14     /  ALT  20     /  AlkPhos  95     09 Apr 2020 06:58    LIVER FUNCTIONS - ( 09 Apr 2020 06:58 )  Alb: 2.0 g/dL / Pro: 6.6 g/dL / ALK PHOS: 95 U/L / ALT: 20 U/L / AST: 14 U/L / GGT: x           acetaminophen   Tablet .. 650 milliGRAM(s) Oral every 4 hours PRN  ALBUTerol    90 MICROgram(s) HFA Inhaler 2 Puff(s) Inhalation every 6 hours PRN  ALPRAZolam 0.25 milliGRAM(s) Oral daily PRN  aspirin enteric coated 81 milliGRAM(s) Oral daily  atorvastatin 10 milliGRAM(s) Oral at bedtime  atropine 1% Ophthalmic Solution for SubLingual Use 3 Drop(s) SubLingual every 8 hours  budesonide 160 MICROgram(s)/formoterol 4.5 MICROgram(s) Inhaler 2 Puff(s) Inhalation two times a day  buPROPion XL . 150 milliGRAM(s) Oral daily  calcitriol   Capsule 0.5 MICROGram(s) Oral daily  calcium acetate 1334 milliGRAM(s) Oral three times a day with meals  calcium gluconate IVPB 2 Gram(s) IV Intermittent every 4 hours  dextrose 40% Gel 15 Gram(s) Oral once PRN  dextrose 5%. 1000 milliLiter(s) IV Continuous <Continuous>  dextrose 5%. 1000 milliLiter(s) IV Continuous <Continuous>  dextrose 50% Injectable 12.5 Gram(s) IV Push once  dextrose 50% Injectable 25 Gram(s) IV Push once  dextrose 50% Injectable 25 Gram(s) IV Push once  glucagon  Injectable 1 milliGRAM(s) IntraMuscular once PRN  guaiFENesin   Syrup  (Sugar-Free) 200 milliGRAM(s) Oral every 6 hours PRN  heparin  Injectable 5000 Unit(s) SubCutaneous every 8 hours  insulin glargine Injectable (LANTUS) 10 Unit(s) SubCutaneous at bedtime  insulin lispro (HumaLOG) corrective regimen sliding scale   SubCutaneous three times a day before meals  insulin lispro (HumaLOG) corrective regimen sliding scale   SubCutaneous at bedtime  metoprolol tartrate 25 milliGRAM(s) Oral two times a day  montelukast 10 milliGRAM(s) Oral daily  piperacillin/tazobactam IVPB.. 3.375 Gram(s) IV Intermittent every 12 hours  predniSONE   Tablet 10 milliGRAM(s) Oral daily  sertraline 100 milliGRAM(s) Oral daily  sodium bicarbonate 650 milliGRAM(s) Oral four times a day    A/P:    Hx COPD, CKD 4 (Cr 2.8 - 5/18/19), metastatic prostate ca  Adm w/COPD exacerbation (COVID negative 3/23 and 4/8)  Severe hypocalcemia on adm w/QT prolongation due to Denosumab (got on 3/12) exacerbated by bone disorder of advanced CKD w/low PTH and hyperphosphatemia, intermittent hypomagnesemia  Ca has improved, but still low  Diffuse bone mets  Continue Phoslo 2 tabs PO TID and Calcitriol PO  Low K, low Na diet  Now w/PABLITO on CKD 4  Na Bicarb added  Poor PO  D5W at 50cc/hr  F/u lytes, renal fx  Overall prognosis is very poor  Palliative following  DNR/I  Comfort care/hospice most appropriate    [phone] Mildly confused, O2 dependent, po intake very poor    Vital Signs Last 24 Hrs  T(C): 36.7 (04-10-20 @ 05:38), Max: 36.8 (04-09-20 @ 23:00)  T(F): 98 (04-10-20 @ 05:38), Max: 98.2 (04-09-20 @ 23:00)  HR: 99 (04-10-20 @ 08:28) (76 - 99)  BP: 168/78 (04-10-20 @ 05:38) (167/64 - 168/78)  RR: 18 (04-10-20 @ 05:38) (18 - 18)  SpO2: 95% (04-10-20 @ 08:28) (94% - 97%)    Card S1S2  Lungs decr BS b/l  Abd soft, NT  Extr tr edema                                                                                                            7.2    14.98 )-----------( 471      ( 10 Apr 2020 06:39 )             23.9     10 Apr 2020 06:39    148    |  114    |  95     ----------------------------<  132    4.3     |  19     |  3.67     Ca    7.2        10 Apr 2020 06:39  Mg     2.2       09 Apr 2020 06:58    TPro  6.6    /  Alb  2.0    /  TBili  0.4    /  DBili  x      /  AST  14     /  ALT  20     /  AlkPhos  95     09 Apr 2020 06:58    LIVER FUNCTIONS - ( 09 Apr 2020 06:58 )  Alb: 2.0 g/dL / Pro: 6.6 g/dL / ALK PHOS: 95 U/L / ALT: 20 U/L / AST: 14 U/L / GGT: x           acetaminophen   Tablet .. 650 milliGRAM(s) Oral every 4 hours PRN  ALBUTerol    90 MICROgram(s) HFA Inhaler 2 Puff(s) Inhalation every 6 hours PRN  ALPRAZolam 0.25 milliGRAM(s) Oral daily PRN  aspirin enteric coated 81 milliGRAM(s) Oral daily  atorvastatin 10 milliGRAM(s) Oral at bedtime  atropine 1% Ophthalmic Solution for SubLingual Use 3 Drop(s) SubLingual every 8 hours  budesonide 160 MICROgram(s)/formoterol 4.5 MICROgram(s) Inhaler 2 Puff(s) Inhalation two times a day  buPROPion XL . 150 milliGRAM(s) Oral daily  calcitriol   Capsule 0.5 MICROGram(s) Oral daily  calcium acetate 1334 milliGRAM(s) Oral three times a day with meals  calcium gluconate IVPB 2 Gram(s) IV Intermittent every 4 hours  dextrose 40% Gel 15 Gram(s) Oral once PRN  dextrose 5%. 1000 milliLiter(s) IV Continuous <Continuous>  dextrose 5%. 1000 milliLiter(s) IV Continuous <Continuous>  dextrose 50% Injectable 12.5 Gram(s) IV Push once  dextrose 50% Injectable 25 Gram(s) IV Push once  dextrose 50% Injectable 25 Gram(s) IV Push once  glucagon  Injectable 1 milliGRAM(s) IntraMuscular once PRN  guaiFENesin   Syrup  (Sugar-Free) 200 milliGRAM(s) Oral every 6 hours PRN  heparin  Injectable 5000 Unit(s) SubCutaneous every 8 hours  insulin glargine Injectable (LANTUS) 10 Unit(s) SubCutaneous at bedtime  insulin lispro (HumaLOG) corrective regimen sliding scale   SubCutaneous three times a day before meals  insulin lispro (HumaLOG) corrective regimen sliding scale   SubCutaneous at bedtime  metoprolol tartrate 25 milliGRAM(s) Oral two times a day  montelukast 10 milliGRAM(s) Oral daily  piperacillin/tazobactam IVPB.. 3.375 Gram(s) IV Intermittent every 12 hours  predniSONE   Tablet 10 milliGRAM(s) Oral daily  sertraline 100 milliGRAM(s) Oral daily  sodium bicarbonate 650 milliGRAM(s) Oral four times a day    A/P:    Hx COPD, CKD 4 (Cr 2.8 - 5/18/19), metastatic prostate ca  Adm w/COPD exacerbation (COVID negative 3/23 and 4/8)  Severe hypocalcemia on adm w/QT prolongation due to Denosumab (got on 3/12) exacerbated by bone disorder of advanced CKD w/low PTH and hyperphosphatemia, intermittent hypomagnesemia  Ca has improved, but still low  Diffuse bone mets  Continue Phoslo 2 tabs PO TID and Calcitriol PO  Low K, low Na diet  Now w/PABLITO on CKD 4  Na Bicarb added  Poor PO, dehydration  Free water deficit ~ 2L  D5W at 50cc/hr  F/u lytes, renal fx  Overall prognosis is very poor  Palliative following  DNR/I  Comfort care/hospice most appropriate    [phone]

## 2020-04-10 NOTE — PHARMACOTHERAPY INTERVENTION NOTE - COMMENTS
Pt is on atropine and scopolamine for secretions. As discussed on interdisciplinary rounds, pt is lethargic and confused. Clarified use of both agents with MD. Since pt is elderly, there is high risk of ADR with anticholinergic agents. MD agreed to discontinue scopolamine patch and only continue atropine at this time.

## 2020-04-10 NOTE — PROGRESS NOTE ADULT - ASSESSMENT
86M w/ PMH of Dementia, DM, HTN, Depression, CKD4, COPD presents to the ED with difficulty breathing and progressive confusion. Admitted delia calle COPD exacerbation with repeated testing negative for COVID (last done 4/8)    *SOB: with hypoxia. Likely multifactorial in etiology. Concern for infection v COPD exacerbation. Repeat COVID testing neg 4/8. Hx diastolic HF pEF  -COVID 19-Negative on 4/8  -Continue with Zosyn (started 4/8), will change to po on d/c home  -Continue Albuterol, Symbicort, Nebs   -Continue PO steroids  -Cont Guaifenesin, atropine, montelukast  -Continue with supplemental oxygen and wean as tolerated, currently saturating 80% on RA, improving to 94% on 4LNC  -Check repeat CXR 4/9    *PNA  -on Zosyn  -Repeat COVID-19 neg 4/8  -Check repeat CXR 4/9    *Metabolic encephalopathy: 2/2 metabolic acidosis/multiple electrolytes abnormalities likely 2/2 Denosumab & PABLITO  -Trend BMP  -Aspiration precautions  -Appreciate Renal recs     *PABLITO (Cr 2.8 in May 2019) on CKD stage 4   -Cr worsening   -Encourage PO intake   -Nephrology following   -Avoid nephrotoxic meds    *Hypernatremia: May be due to poor PO intake  -Encourage PO fluids  -Monitor labs    *Hypocalcemia likely 2/2 Denosumab vs secondary hyperparathyroidism   -Prolonged QTc on ECG (566) on admission - improving ( on 3/30)  -Continue Rocaltrol Phoslo   -Appreciate Renal recs   -Trend calcium levels     *Normocytic anemia 2/2 CKD  -Monitor labs    *Leukocytosis:   -Continue with Zosyn  -Monitor labs    *Urinary retention  -Off flomax, voiding    *HTN: BP stable  -Continue Metoprolol     *DM2  -increase Lantus 10 Units HS   -Cont FS/ISS  -on steroids     *HLD:  -Continue atorvastatin    *Depression  -Cont Sertraline, Buproprion (home dose buproprion 100mg BID, pharmacy here only has 150mg XL)  -Continue Xanax 0.25mg qd prn    *DVT ppx- heparin    *Diet: Pureed - thins  -Assistance with feeds    *Goals of care: DNR/DNI  Discussed case with daughter, palliative care and nephrology about workup and prognosis. Family would like to pursue home hospice care at this time and to continue for patient to be DNR/DNI. Discussed with social work and working on arrangements.   time spent 16 min

## 2020-04-10 NOTE — PROGRESS NOTE ADULT - SUBJECTIVE AND OBJECTIVE BOX
86M w/ PMH of Dementia, DM, HTN, Depression, CKD4, COPD presents to the ED with difficulty breathing and progressive confusion. Admitted delia calle COPD exacerbation.     Patient seen at bedside. Patient having difficulty with command following, but occasionally answering questions appropriately. Patient with persistent cough and some difficulty breathing. Reports that he is hungry.  Denies abdominal pain, nausea or vomiting. Currently saturating 80% on RA and 94% on 4LNC.     Vital Signs Last 24 Hrs  T(C): 37.1 (10 Apr 2020 16:23), Max: 37.1 (10 Apr 2020 16:23)  T(F): 98.8 (10 Apr 2020 16:23), Max: 98.8 (10 Apr 2020 16:23)  HR: 100 (10 Apr 2020 16:23) (76 - 100)  BP: 176/87 (10 Apr 2020 16:23) (167/64 - 176/87)  BP(mean): --  RR: 18 (10 Apr 2020 16:23) (18 - 18)  SpO2: 93% (10 Apr 2020 16:23) (93% - 97%)    GENERAL- NAD  EAR/NOSE/MOUTH/THROAT - no pharyngeal exudates, no oral lesions,  dry mucus membranes  EYES- ALLEGRA, conjunctiva and Sclera clear  NECK- supple, No limited ROM  RESPIRATORY-  cough during exam bringing up thick sputum, gurgling, rhonchi/rales, saturating 80% on RA, improving to 94% on 4LNC  CARDIOVASCULAR - SIS2, mild tachy  GI - soft NT BS present  EXTREMITIES- no pedal edema, left leg eschar  NEUROLOGY- no gross focal deficits, awake and oriented to self, place and year. Intermittent command following  PSYCHIATRY- AAO X to person, place as "hospital" and year  MUSCULOSKELETAL- ROM normal                          7.2    14.98 )-----------( 471      ( 10 Apr 2020 06:39 )             23.9   04-10    148<H>  |  114<H>  |  95<H>  ----------------------------<  132<H>  4.3   |  19<L>  |  3.67<H>    Ca    7.2<L>      10 Apr 2020 06:39  Mg     2.2     04-09    TPro  6.6  /  Alb  2.0<L>  /  TBili  0.4  /  DBili  x   /  AST  14  /  ALT  20  /  AlkPhos  95  04-09       Comprehensive Metabolic Panel in AM (04.09.20 @ 06:58)    Protein Total, Serum: 6.6 g/dL    Albumin, Serum: 2.0 g/dL    Bilirubin Total, Serum: 0.4: Results verified. mg/dL    Alkaline Phosphatase, Serum: 95 U/L    Aspartate Aminotransferase (AST/SGOT): 14 U/L    Alanine Aminotransferase (ALT/SGPT): 20 U/L    CAPILLARY BLOOD GLUCOSE      POCT Blood Glucose.: 169 mg/dL (10 Apr 2020 12:17)  POCT Blood Glucose.: 149 mg/dL (10 Apr 2020 08:24)  POCT Blood Glucose.: 257 mg/dL (09 Apr 2020 22:58)    COVID test: Negative 4/8    Labs reviewed personally     CXR personally reviewed:   < from: Xray Chest 1 View- PORTABLE-Urgent (04.07.20 @ 21:00) >  IMPRESSION:  Basilar atelectasis with small infiltrates. Possible bone metastases. Clinical correlation is suggested.    < end of copied text >    ECG reviewed and interpreted:   < from: 12 Lead ECG (03.30.20 @ 11:21) >  Ventricular Rate 87 BPM    Atrial Rate 87 BPM    P-R Interval 142 ms    QRS Duration 78 ms    Q-T Interval 414 ms    QTC Calculation(Bezet) 498 ms    P Axis -3 degrees    R Axis -28 degrees    < from: TTE Echo Complete w/o contrast w/ Doppler (03.24.20 @ 13:13) >  T Axis 38 degrees    < end of copied text >    Echo:  < from: TTE Echo Complete w/o contrast w/ Doppler (03.24.20 @ 13:13) >  Summary:   1. Left ventricular ejection fraction, by visual estimation, is 55 to 60%.   2. Technically fair study.   3. Normal global left ventricular systolic function.   4. Spectral Doppler shows impaired relaxation pattern of left ventricular myocardial filling (Grade I diastolic dysfunction).   5. Mild thickening and calcification of the anterior and posterior mitral valve leaflets.   6.Sclerotic aortic valve with normal opening.   7. Estimated pulmonary artery systolic pressure is 37.5 mmHg assuming a right atrial pressure of 10 mmHg, which is consistent with borderline pulmonary hypertension.   8. LA volume Index is 33.6 ml/m² ml/m2.    < end of copied text >      MEDICATIONS  (STANDING):  ALPRAZolam 0.25 milliGRAM(s) Oral daily  aspirin enteric coated 81 milliGRAM(s) Oral daily  atorvastatin 10 milliGRAM(s) Oral at bedtime  atropine 1% Ophthalmic Solution for SubLingual Use 3 Drop(s) SubLingual every 8 hours  budesonide 160 MICROgram(s)/formoterol 4.5 MICROgram(s) Inhaler 2 Puff(s) Inhalation two times a day  buPROPion XL . 150 milliGRAM(s) Oral daily  calcitriol   Capsule 0.5 MICROGram(s) Oral daily  calcium acetate 1334 milliGRAM(s) Oral three times a day with meals  heparin  Injectable 5000 Unit(s) SubCutaneous every 8 hours  insulin glargine Injectable (LANTUS) 7 Unit(s) SubCutaneous at bedtime  insulin lispro (HumaLOG) corrective regimen sliding scale   SubCutaneous three times a day before meals  insulin lispro (HumaLOG) corrective regimen sliding scale   SubCutaneous at bedtime  metoprolol tartrate 25 milliGRAM(s) Oral two times a day  montelukast 10 milliGRAM(s) Oral daily  piperacillin/tazobactam IVPB.. 3.375 Gram(s) IV Intermittent every 12 hours  predniSONE   Tablet 10 milliGRAM(s) Oral daily  sertraline 100 milliGRAM(s) Oral daily    MEDICATIONS  (PRN):  acetaminophen   Tablet .. 650 milliGRAM(s) Oral every 4 hours PRN Mild Pain (1 - 3)  ALBUTerol    90 MICROgram(s) HFA Inhaler 2 Puff(s) Inhalation every 6 hours PRN Shortness of Breath and/or Wheezing  dextrose 40% Gel 15 Gram(s) Oral once PRN Blood Glucose LESS THAN 70 milliGRAM(s)/deciliter  glucagon  Injectable 1 milliGRAM(s) IntraMuscular once PRN Glucose LESS THAN 70 milligrams/deciliter  guaiFENesin   Syrup  (Sugar-Free) 200 milliGRAM(s) Oral every 6 hours PRN Cough

## 2020-04-30 PROCEDURE — 80053 COMPREHEN METABOLIC PANEL: CPT

## 2020-04-30 PROCEDURE — 82306 VITAMIN D 25 HYDROXY: CPT

## 2020-04-30 PROCEDURE — 71250 CT THORAX DX C-: CPT

## 2020-04-30 PROCEDURE — 84145 PROCALCITONIN (PCT): CPT

## 2020-04-30 PROCEDURE — 97162 PT EVAL MOD COMPLEX 30 MIN: CPT

## 2020-04-30 PROCEDURE — 82310 ASSAY OF CALCIUM: CPT

## 2020-04-30 PROCEDURE — 84100 ASSAY OF PHOSPHORUS: CPT

## 2020-04-30 PROCEDURE — 85610 PROTHROMBIN TIME: CPT

## 2020-04-30 PROCEDURE — 93306 TTE W/DOPPLER COMPLETE: CPT

## 2020-04-30 PROCEDURE — 97110 THERAPEUTIC EXERCISES: CPT

## 2020-04-30 PROCEDURE — 83605 ASSAY OF LACTIC ACID: CPT

## 2020-04-30 PROCEDURE — 96365 THER/PROPH/DIAG IV INF INIT: CPT

## 2020-04-30 PROCEDURE — 94640 AIRWAY INHALATION TREATMENT: CPT

## 2020-04-30 PROCEDURE — 92610 EVALUATE SWALLOWING FUNCTION: CPT

## 2020-04-30 PROCEDURE — 80048 BASIC METABOLIC PNL TOTAL CA: CPT

## 2020-04-30 PROCEDURE — 82962 GLUCOSE BLOOD TEST: CPT

## 2020-04-30 PROCEDURE — U0001: CPT

## 2020-04-30 PROCEDURE — 81001 URINALYSIS AUTO W/SCOPE: CPT

## 2020-04-30 PROCEDURE — 82330 ASSAY OF CALCIUM: CPT

## 2020-04-30 PROCEDURE — 84443 ASSAY THYROID STIM HORMONE: CPT

## 2020-04-30 PROCEDURE — 36600 WITHDRAWAL OF ARTERIAL BLOOD: CPT

## 2020-04-30 PROCEDURE — 73120 X-RAY EXAM OF HAND: CPT

## 2020-04-30 PROCEDURE — 96375 TX/PRO/DX INJ NEW DRUG ADDON: CPT

## 2020-04-30 PROCEDURE — 85027 COMPLETE CBC AUTOMATED: CPT

## 2020-04-30 PROCEDURE — 87798 DETECT AGENT NOS DNA AMP: CPT

## 2020-04-30 PROCEDURE — 36415 COLL VENOUS BLD VENIPUNCTURE: CPT

## 2020-04-30 PROCEDURE — 83735 ASSAY OF MAGNESIUM: CPT

## 2020-04-30 PROCEDURE — 82652 VIT D 1 25-DIHYDROXY: CPT

## 2020-04-30 PROCEDURE — 84550 ASSAY OF BLOOD/URIC ACID: CPT

## 2020-04-30 PROCEDURE — 83615 LACTATE (LD) (LDH) ENZYME: CPT

## 2020-04-30 PROCEDURE — 83970 ASSAY OF PARATHORMONE: CPT

## 2020-04-30 PROCEDURE — 87040 BLOOD CULTURE FOR BACTERIA: CPT

## 2020-04-30 PROCEDURE — 85730 THROMBOPLASTIN TIME PARTIAL: CPT

## 2020-04-30 PROCEDURE — 71045 X-RAY EXAM CHEST 1 VIEW: CPT

## 2020-04-30 PROCEDURE — 82803 BLOOD GASES ANY COMBINATION: CPT

## 2020-04-30 PROCEDURE — 87635 SARS-COV-2 COVID-19 AMP PRB: CPT

## 2020-04-30 PROCEDURE — 97530 THERAPEUTIC ACTIVITIES: CPT

## 2020-04-30 PROCEDURE — 83036 HEMOGLOBIN GLYCOSYLATED A1C: CPT

## 2020-04-30 PROCEDURE — 93005 ELECTROCARDIOGRAM TRACING: CPT

## 2020-04-30 PROCEDURE — 74176 CT ABD & PELVIS W/O CONTRAST: CPT

## 2020-04-30 PROCEDURE — 87486 CHLMYD PNEUM DNA AMP PROBE: CPT

## 2020-04-30 PROCEDURE — 84484 ASSAY OF TROPONIN QUANT: CPT

## 2020-04-30 PROCEDURE — 87086 URINE CULTURE/COLONY COUNT: CPT

## 2020-04-30 PROCEDURE — 99285 EMERGENCY DEPT VISIT HI MDM: CPT | Mod: 25

## 2020-04-30 PROCEDURE — 87633 RESP VIRUS 12-25 TARGETS: CPT

## 2020-04-30 PROCEDURE — 87581 M.PNEUMON DNA AMP PROBE: CPT

## 2021-04-21 NOTE — ED PROVIDER NOTE - CARDIAC, MLM
Patient is a 52y old  Male who presents with a chief complaint of GBS (20 Apr 2021 15:11) protein  c/w GBS given albumin/ cytologic dissociation refused IVIG but agreed for PLEX.  Today procedure #1 had to stop the procedure after 1600 cc replacement due to hypotension, which responded to 500cc bolus. Upon restarting the procedure, although with slower rate, again developed hypotension. I asked to nurse to stop the procedure. I reviewed his meds, he is on Losartan. Although ACE inhibitor not ARB usually  induce hypotension I recommend to withhold Losartan 24 hours before exchange if it is possible. Case was discussed with Nafisa.      Vital Signs Last 24 Hrs  T(C): 36.8 (21 Apr 2021 14:17), Max: 37.2 (21 Apr 2021 05:14)  T(F): 98.2 (21 Apr 2021 14:17), Max: 98.9 (21 Apr 2021 05:14)  HR: 71 (21 Apr 2021 14:17) (70 - 89)  BP: 159/87 (21 Apr 2021 05:14) (113/65 - 159/87)  BP(mean): --  RR: 18 (21 Apr 2021 14:17) (18 - 18)  SpO2: 100% (21 Apr 2021 14:17) (99% - 100%)  Allergies    No Known Drug Allergies  shellfish (Urticaria)    Intolerances      PAST MEDICAL & SURGICAL HISTORY:  DM (diabetes mellitus)    HTN (hypertension)    Hyperlipidemia    H/O total knee replacement    Foot fracture                                  12.7   8.32  )-----------( 273      ( 21 Apr 2021 07:21 )             38.1     PT/INR - ( 20 Apr 2021 11:52 )   PT: 11.1 sec;   INR: 0.96 ratio         PTT - ( 20 Apr 2021 07:35 )  PTT:29.7 sec          04-21    130<L>  |  98  |  22  ----------------------------<  237<H>  4.9   |  23  |  0.97    Ca    9.5      21 Apr 2021 07:21  Phos  3.4     04-21  Mg     2.0     04-21    TPro  7.2  /  Alb  3.7  /  TBili  0.2  /  DBili  <0.2  /  AST  25  /  ALT  63<H>  /  AlkPhos  135<H>  04-20               Normal rate, regular rhythm.  Heart sounds S1, S2.  No murmurs, rubs or gallops.

## 2023-08-22 NOTE — ED PROVIDER NOTE - CONSTITUTIONAL DEVELOPMENT, MLM
well developed O-L Flap Text: The defect edges were debeveled with a #15 scalpel blade. Given the location of the defect, shape of the defect and the proximity to free margins an O-L flap was deemed most appropriate. Using a sterile surgical marker, an appropriate advancement flap was drawn incorporating the defect and placing the expected incisions within the relaxed skin tension lines where possible. The area thus outlined was incised deep to adipose tissue with a #15 scalpel blade. The skin margins were undermined to an appropriate distance in all directions utilizing iris scissors. Following this, the designed flap was advanced and carried over into the primary defect and sutured into place.

## 2023-10-24 NOTE — H&P ADULT - NSHPPHYSICALEXAM_GEN_ALL_CORE
Rx was filled at [facility]   
.  VITAL SIGNS:  T(C): 36.7 (03-23-20 @ 16:05), Max: 36.7 (03-23-20 @ 16:05)  T(F): 98 (03-23-20 @ 16:05), Max: 98 (03-23-20 @ 16:05)  HR: 73 (03-23-20 @ 15:56) (73 - 73)  BP: 141/68 (03-23-20 @ 15:56) (141/68 - 141/68)  BP(mean): --  RR: 24 (03-23-20 @ 15:56) (24 - 24)  SpO2: 100% (03-23-20 @ 15:56) (100% - 100%)  Wt(kg): --    PHYSICAL EXAM:    Constitutional: elderly male, anxious and restless, in NAD  Head: NC/AT  Eyes: PERRLA, EOMI, clear conjunctiva  ENT: no nasal discharge; no oropharyngeal erythema or exudates; dry oral mucosa  Neck: supple; no JVD or thyromegaly  Respiratory: decreased breath sounds throughout both lung fields with scattered wheezing, left base with crackles  Cardiac: +S1/S2; RRR; no M/R/G; PMI non-displaced  Gastrointestinal: soft, NT/ND; no rebound or guarding; +BS, no hepatosplenomegaly  Extremities: WWP, no clubbing or cyanosis; no peripheral edema  Vascular: 2+ radial, DP/PT pulses B/L  Neurologic: AAOx2 (oriented to person and place), appears restless, complains that he cannot move his legs even though he is moving all extremities